# Patient Record
Sex: MALE | Race: WHITE | NOT HISPANIC OR LATINO | Employment: OTHER | URBAN - METROPOLITAN AREA
[De-identification: names, ages, dates, MRNs, and addresses within clinical notes are randomized per-mention and may not be internally consistent; named-entity substitution may affect disease eponyms.]

---

## 2015-11-10 LAB — HBA1C MFR BLD HPLC: 6.3 %

## 2018-08-23 ENCOUNTER — TELEPHONE (OUTPATIENT)
Dept: OBGYN CLINIC | Facility: HOSPITAL | Age: 68
End: 2018-08-23

## 2018-08-23 NOTE — TELEPHONE ENCOUNTER
Call from patient   Call back # [phone]  Bismark Mace       Patient would like a letter stating he had hip surgery and needs to pre-medicate before any dental work  Please mail to patients home     [address] [place] [address]      Patient would also like antibiotics sent to [facility]  Dannydra Zaragoza is requesting more than 1 dose, he will need to return to dentist a few times       Pharmacy-   [facility]/pharmacy #[id number], [address] [facility] [address], [address] [facility] [address] (Phone)  [phone] (Fax)     Thank you

## 2018-08-24 DIAGNOSIS — M25.569 KNEE PAIN, UNSPECIFIED CHRONICITY, UNSPECIFIED LATERALITY: Primary | ICD-10-CM

## 2018-08-24 RX ORDER — CEPHALEXIN 500 MG/1
500 CAPSULE ORAL SEE ADMIN INSTRUCTIONS
Qty: 4 CAPSULE | Refills: 2 | Status: SHIPPED | OUTPATIENT
Start: 2018-08-24 | End: 2018-08-25

## 2018-09-06 ENCOUNTER — OFFICE VISIT (OUTPATIENT)
Dept: FAMILY MEDICINE CLINIC | Facility: CLINIC | Age: 68
End: 2018-09-06
Payer: COMMERCIAL

## 2018-09-06 VITALS
RESPIRATION RATE: 18 BRPM | OXYGEN SATURATION: 98 % | BODY MASS INDEX: 24.53 KG/M2 | SYSTOLIC BLOOD PRESSURE: 152 MMHG | HEART RATE: 71 BPM | TEMPERATURE: 98 F | HEIGHT: 71 IN | WEIGHT: 175.2 LBS | DIASTOLIC BLOOD PRESSURE: 72 MMHG

## 2018-09-06 DIAGNOSIS — R73.01 IFG (IMPAIRED FASTING GLUCOSE): Primary | ICD-10-CM

## 2018-09-06 DIAGNOSIS — Z13.220 SCREENING CHOLESTEROL LEVEL: ICD-10-CM

## 2018-09-06 DIAGNOSIS — Z12.5 SCREENING PSA (PROSTATE SPECIFIC ANTIGEN): ICD-10-CM

## 2018-09-06 PROCEDURE — 99387 INIT PM E/M NEW PAT 65+ YRS: CPT | Performed by: FAMILY MEDICINE

## 2018-09-06 PROCEDURE — 1160F RVW MEDS BY RX/DR IN RCRD: CPT | Performed by: FAMILY MEDICINE

## 2018-09-06 RX ORDER — CEPHALEXIN 500 MG/1
CAPSULE ORAL
COMMUNITY
Start: 2015-11-10 | End: 2018-09-06

## 2018-09-06 RX ORDER — MELATONIN
1000 DAILY
COMMUNITY
End: 2019-01-04

## 2018-09-06 NOTE — PROGRESS NOTES
Assessment/Plan:    No problem-specific Assessment & Plan notes found for this encounter  Diagnoses and all orders for this visit:    IFG (impaired fasting glucose)  -     HEMOGLOBIN A1C W/ EAG ESTIMATION; Future  -     Comprehensive metabolic panel; Future  -     Lipid Panel with Direct LDL reflex; Future  -     PSA, total and free; Future    Screening PSA (prostate specific antigen)  -     HEMOGLOBIN A1C W/ EAG ESTIMATION; Future  -     Comprehensive metabolic panel; Future  -     Lipid Panel with Direct LDL reflex; Future  -     PSA, total and free; Future    Screening cholesterol level  -     HEMOGLOBIN A1C W/ EAG ESTIMATION; Future  -     Comprehensive metabolic panel; Future  -     Lipid Panel with Direct LDL reflex; Future  -     PSA, total and free; Future    Get labs will call you with results  If all lab stable physical yearly        Subjective:      Patient ID: Mohsen Jama is a 79 y o  male  HPI    New pt  Presents to establish care and get a physical    Patient has a healthy 68-year-old male who is  and recently retired  He is physically active he bikes every day  He maintains a healthy diet  Last physical about 3 years ago  He is due for his colonoscopy, per patient last 1 was normal       He has no acute issues or complaints  The following portions of the patient's history were reviewed and updated as appropriate: allergies, current medications, past family history, past medical history, past social history, past surgical history and problem list     Review of Systems   Constitutional: Negative for activity change and appetite change  Respiratory: Negative  Cardiovascular: Negative  Gastrointestinal: Negative  Genitourinary: Negative  Musculoskeletal: Negative for arthralgias  Skin: Negative for rash  Psychiatric/Behavioral: Negative            Objective:      /72   Pulse 71   Temp 98 °F (36 7 °C) (Oral)   Resp 18   Ht 5' 10 87" (1 8 m)   Wt 79 5 kg (175 lb 3 2 oz)   SpO2 98%   BMI 24 53 kg/m²          Physical Exam   Constitutional: He is oriented to person, place, and time  He appears well-developed and well-nourished  No distress  HENT:   Head: Normocephalic  Right Ear: External ear normal    Left Ear: External ear normal    Eyes: Conjunctivae and EOM are normal    Cardiovascular: Normal rate, regular rhythm and normal heart sounds  Pulmonary/Chest: Effort normal and breath sounds normal  No respiratory distress  He has no wheezes  He has no rales  Abdominal: Soft  Bowel sounds are normal  He exhibits no distension  There is no tenderness  Genitourinary: Rectum normal and prostate normal  Rectal exam shows guaiac negative stool  Musculoskeletal: Normal range of motion  He exhibits no edema  Lymphadenopathy:     He has no cervical adenopathy  Neurological: He is alert and oriented to person, place, and time  Psychiatric: He has a normal mood and affect   His behavior is normal

## 2018-09-07 ENCOUNTER — APPOINTMENT (OUTPATIENT)
Dept: LAB | Facility: HOSPITAL | Age: 68
End: 2018-09-07
Payer: COMMERCIAL

## 2018-09-07 DIAGNOSIS — Z12.5 SCREENING PSA (PROSTATE SPECIFIC ANTIGEN): ICD-10-CM

## 2018-09-07 DIAGNOSIS — R73.01 IFG (IMPAIRED FASTING GLUCOSE): ICD-10-CM

## 2018-09-07 DIAGNOSIS — Z13.220 SCREENING CHOLESTEROL LEVEL: ICD-10-CM

## 2018-09-07 LAB
ALBUMIN SERPL BCP-MCNC: 3.9 G/DL (ref 3.5–5)
ALP SERPL-CCNC: 80 U/L (ref 46–116)
ALT SERPL W P-5'-P-CCNC: 25 U/L (ref 12–78)
ANION GAP SERPL CALCULATED.3IONS-SCNC: 8 MMOL/L (ref 4–13)
AST SERPL W P-5'-P-CCNC: 22 U/L (ref 5–45)
BILIRUB SERPL-MCNC: 0.83 MG/DL (ref 0.2–1)
BUN SERPL-MCNC: 28 MG/DL (ref 5–25)
CALCIUM SERPL-MCNC: 8.9 MG/DL (ref 8.3–10.1)
CHLORIDE SERPL-SCNC: 107 MMOL/L (ref 100–108)
CHOLEST SERPL-MCNC: 144 MG/DL (ref 50–200)
CO2 SERPL-SCNC: 25 MMOL/L (ref 21–32)
CREAT SERPL-MCNC: 1 MG/DL (ref 0.6–1.3)
EST. AVERAGE GLUCOSE BLD GHB EST-MCNC: 128 MG/DL
GFR SERPL CREATININE-BSD FRML MDRD: 78 ML/MIN/1.73SQ M
GLUCOSE P FAST SERPL-MCNC: 125 MG/DL (ref 65–99)
HBA1C MFR BLD: 6.1 % (ref 4.2–6.3)
HDLC SERPL-MCNC: 56 MG/DL (ref 40–60)
LDLC SERPL CALC-MCNC: 80 MG/DL (ref 0–100)
POTASSIUM SERPL-SCNC: 4.6 MMOL/L (ref 3.5–5.3)
PROT SERPL-MCNC: 7 G/DL (ref 6.4–8.2)
SODIUM SERPL-SCNC: 140 MMOL/L (ref 136–145)
TRIGL SERPL-MCNC: 39 MG/DL

## 2018-09-07 PROCEDURE — 84154 ASSAY OF PSA FREE: CPT

## 2018-09-07 PROCEDURE — 80053 COMPREHEN METABOLIC PANEL: CPT

## 2018-09-07 PROCEDURE — 84153 ASSAY OF PSA TOTAL: CPT

## 2018-09-07 PROCEDURE — 80061 LIPID PANEL: CPT

## 2018-09-07 PROCEDURE — 36415 COLL VENOUS BLD VENIPUNCTURE: CPT

## 2018-09-07 PROCEDURE — 83036 HEMOGLOBIN GLYCOSYLATED A1C: CPT

## 2018-09-08 LAB
PSA FREE MFR SERPL: 9.4 %
PSA FREE SERPL-MCNC: 0.31 NG/ML
PSA SERPL-MCNC: 3.3 NG/ML (ref 0–4)

## 2018-11-16 ENCOUNTER — OFFICE VISIT (OUTPATIENT)
Dept: FAMILY MEDICINE CLINIC | Facility: CLINIC | Age: 68
End: 2018-11-16

## 2018-11-16 VITALS
WEIGHT: 185 LBS | BODY MASS INDEX: 25.9 KG/M2 | HEIGHT: 71 IN | SYSTOLIC BLOOD PRESSURE: 120 MMHG | TEMPERATURE: 97.3 F | DIASTOLIC BLOOD PRESSURE: 68 MMHG | RESPIRATION RATE: 12 BRPM | HEART RATE: 58 BPM

## 2018-11-16 DIAGNOSIS — Z13.6 SCREENING FOR CARDIOVASCULAR CONDITION: ICD-10-CM

## 2018-11-16 DIAGNOSIS — Z11.59 ENCOUNTER FOR HEPATITIS C SCREENING TEST FOR LOW RISK PATIENT: ICD-10-CM

## 2018-11-16 DIAGNOSIS — H91.90 PERCEIVED HEARING CHANGES: Primary | ICD-10-CM

## 2018-11-16 DIAGNOSIS — R73.01 IFG (IMPAIRED FASTING GLUCOSE): ICD-10-CM

## 2018-11-16 PROCEDURE — 3008F BODY MASS INDEX DOCD: CPT | Performed by: NURSE PRACTITIONER

## 2018-11-16 PROCEDURE — 99213 OFFICE O/P EST LOW 20 MIN: CPT | Performed by: NURSE PRACTITIONER

## 2018-11-16 PROCEDURE — 1101F PT FALLS ASSESS-DOCD LE1/YR: CPT | Performed by: NURSE PRACTITIONER

## 2018-11-16 RX ORDER — INFLUENZA A VIRUSA/MICHIGAN/45/2015 X-275 (H1N1) ANTIGEN (FORMALDEHYDE INACTIVATED), INFLUENZA A VIRUS A/HONG KONG/4801/2014 X-263B (H3N2) ANTIGEN (FORMALDEHYDE INACTIVATED), AND INFLUENZA B VIRUS B/BRISBANE/60/2008 ANTIGEN (FORMALDEHYDE INACTIVATED) 60; 60; 60 UG/.5ML; UG/.5ML; UG/.5ML
INJECTION, SUSPENSION INTRAMUSCULAR
Refills: 0 | COMMUNITY
Start: 2018-10-15 | End: 2018-11-16

## 2018-11-16 NOTE — PROGRESS NOTES
Assessment/Plan:    No problem-specific Assessment & Plan notes found for this encounter  Diagnoses and all orders for this visit:    Perceived hearing changes  -     Ambulatory referral to Audiology; Future    Encounter for hepatitis C screening test for low risk patient  -     Hepatitis C antibody; Future    IFG (impaired fasting glucose)  -     Comprehensive metabolic panel; Future  -     Hemoglobin A1C; Future    Screening for cardiovascular condition  -     Lipid panel; Future  -     Comprehensive metabolic panel; Future  -     Hemoglobin A1C; Future    Other orders  -     Discontinue: FLUZONE HIGH-DOSE 0 5 ML VERÓNICA; TO BE ADMINISTERED BY PHARMACIST FOR IMMUNIZATION          Subjective:      Patient ID: Felicitas Morales is a 79 y o  male  HPI  Here for an acute - hearing changes over the last few months - unable to hear TV at times; conversations  No ear pain   No ringing in the ear  Use air buds to work out  The following portions of the patient's history were reviewed and updated as appropriate: allergies, past social history, past surgical history and problem list     Review of Systems   Constitutional: Negative  HENT: Positive for hearing loss  Respiratory: Negative  Cardiovascular: Negative  Objective:      Blood pressure 120/68, pulse 58, temperature (!) 97 3 °F (36 3 °C), resp  rate 12, height 5' 11" (1 803 m), weight 83 9 kg (185 lb)  Physical Exam   Constitutional: He is oriented to person, place, and time  He appears well-developed and well-nourished  No distress  HENT:   Head: Normocephalic  Right Ear: Tympanic membrane and ear canal normal  No middle ear effusion  No decreased hearing is noted  Left Ear: Tympanic membrane and ear canal normal   No middle ear effusion  Decreased hearing (BC> AC but less then RT ear; no lateralization) is noted     Nose: Nose normal    Mouth/Throat: Uvula is midline and oropharynx is clear and moist    Cardiovascular: Normal rate and regular rhythm  Pulmonary/Chest: Effort normal and breath sounds normal  No respiratory distress  Neurological: He is alert and oriented to person, place, and time  Skin: Skin is warm and dry  Psychiatric: He has a normal mood and affect   His behavior is normal

## 2018-12-04 ENCOUNTER — OFFICE VISIT (OUTPATIENT)
Dept: AUDIOLOGY | Age: 68
End: 2018-12-04
Payer: COMMERCIAL

## 2018-12-04 DIAGNOSIS — H90.3 SENSORINEURAL HEARING LOSS, BILATERAL: Primary | ICD-10-CM

## 2018-12-04 PROCEDURE — 92567 TYMPANOMETRY: CPT | Performed by: AUDIOLOGIST

## 2018-12-04 PROCEDURE — 92557 COMPREHENSIVE HEARING TEST: CPT | Performed by: AUDIOLOGIST

## 2018-12-04 NOTE — LETTER
2018     Render Alissa, [address] [facility] [place] [address]    Patient: Darren Posey   YOB: 1950   Date of Visit: 2018       Dear Dr Nick Bruce: Thank you for referring Monico Amador to me for evaluation  Below are my notes for this consultation  If you have questions, please do not hesitate to call me  I look forward to following your patient along with you  Sincerely,        Liam        CC: No Recipients  Liam  2018 12:54 PM  Sign at close encounter  HEARING EVALUATION    Name:  Darren Posey  :  1950  Age:  79 y o  Date of Evaluation: 18     History: Difficulty Understanding  Reason for visit: Darren Posey is being seen today at the request of Dr Nick Bruce for an evaluation of hearing  Patient reports difficulty   EVALUATION:    Otoscopic Evaluation:   Right Ear: Clear and healthy ear canal and tympanic membrane   Left Ear: Clear and healthy ear canal and tympanic membrane    Tympanometry:   Right: Type A - normal middle ear pressure and compliance   Left: Type A - normal middle ear pressure and compliance    Audiogram Results:  Pure tone testing revealed a mild sensorineural hearing loss in the  left  ear and a normal sloping to mild sensorineural hearing loss in the  right ear  SRT and PTA are in agreement indicating good test reliability  Word recognition scores were excellent bilaterally  *see attached audiogram      RECOMMENDATIONS:  Annual hearing eval, Return to [facility]  for F/U and Copy to Patient/Caregiver    PATIENT EDUCATION:   Discussed results and recommendations with patient  Questions were addressed and the patient was encouraged to contact our department should concerns arise        Sruthi Wheeler CCC-A  Clinical Audiologist

## 2018-12-04 NOTE — PROGRESS NOTES
HEARING EVALUATION    Name:  Maged Barr  :  1950  Age:  79 y o  Date of Evaluation: 18     History: Difficulty Understanding  Reason for visit: Maged Barr is being seen today at the request of Dr Chema Mejia for an evaluation of hearing  Patient reports difficulty   EVALUATION:    Otoscopic Evaluation:   Right Ear: Clear and healthy ear canal and tympanic membrane   Left Ear: Clear and healthy ear canal and tympanic membrane    Tympanometry:   Right: Type A - normal middle ear pressure and compliance   Left: Type A - normal middle ear pressure and compliance    Audiogram Results:  Pure tone testing revealed a mild sensorineural hearing loss in the  left  ear and a normal sloping to mild sensorineural hearing loss in the  right ear  SRT and PTA are in agreement indicating good test reliability  Word recognition scores were excellent bilaterally  *see attached audiogram      RECOMMENDATIONS:  Annual hearing eval, Return to [facility]  for F/U and Copy to Patient/Caregiver    PATIENT EDUCATION:   Discussed results and recommendations with patient  Questions were addressed and the patient was encouraged to contact our department should concerns arise        Sruthi Ruvalcaba , [facility]-A  Clinical Audiologist

## 2019-01-04 ENCOUNTER — APPOINTMENT (EMERGENCY)
Dept: RADIOLOGY | Facility: HOSPITAL | Age: 69
End: 2019-01-04
Payer: COMMERCIAL

## 2019-01-04 ENCOUNTER — HOSPITAL ENCOUNTER (EMERGENCY)
Facility: HOSPITAL | Age: 69
Discharge: HOME | End: 2019-01-04
Attending: EMERGENCY MEDICINE | Admitting: EMERGENCY MEDICINE
Payer: COMMERCIAL

## 2019-01-04 VITALS
RESPIRATION RATE: 18 BRPM | HEIGHT: 71 IN | OXYGEN SATURATION: 95 % | WEIGHT: 185 LBS | SYSTOLIC BLOOD PRESSURE: 131 MMHG | TEMPERATURE: 97.7 F | BODY MASS INDEX: 25.9 KG/M2 | HEART RATE: 76 BPM | DIASTOLIC BLOOD PRESSURE: 62 MMHG

## 2019-01-04 DIAGNOSIS — S63.259A DISLOCATION CLOSED, FINGER, INITIAL ENCOUNTER: Primary | ICD-10-CM
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
## 2019-01-04 DIAGNOSIS — M25.559 HIP PAIN: ICD-10-CM

## 2019-01-04 LAB
HOLD SPECIMEN: NORMAL

## 2019-01-04 PROCEDURE — 71045 X-RAY EXAM CHEST 1 VIEW: CPT

## 2019-01-04 PROCEDURE — 99284 EMERGENCY DEPT VISIT MOD MDM: CPT

## 2019-01-04 PROCEDURE — 96374 THER/PROPH/DIAG INJ IV PUSH: CPT

## 2019-01-04 PROCEDURE — 73140 X-RAY EXAM OF FINGER(S): CPT

## 2019-01-04 PROCEDURE — 73502 X-RAY EXAM HIP UNI 2-3 VIEWS: CPT

## 2019-01-04 PROCEDURE — 96375 TX/PRO/DX INJ NEW DRUG ADDON: CPT

## 2019-01-04 PROCEDURE — 36415 COLL VENOUS BLD VENIPUNCTURE: CPT | Performed by: EMERGENCY MEDICINE

## 2019-01-04 PROCEDURE — 96376 TX/PRO/DX INJ SAME DRUG ADON: CPT

## 2019-01-04 RX ORDER — MORPHINE SULFATE 4 MG/ML
4 INJECTION, SOLUTION INTRAMUSCULAR; INTRAVENOUS ONCE
Status: COMPLETED | OUTPATIENT
Start: 2019-01-04 | End: 2019-01-04

## 2019-01-04 RX ORDER — KETOROLAC TROMETHAMINE 30 MG/ML
15 INJECTION, SOLUTION INTRAMUSCULAR; INTRAVENOUS ONCE
Status: COMPLETED | OUTPATIENT
Start: 2019-01-04 | End: 2019-01-04

## 2019-01-04 RX ORDER — LORAZEPAM 2 MG/ML
1 INJECTION INTRAMUSCULAR ONCE
Status: COMPLETED | OUTPATIENT
Start: 2019-01-04 | End: 2019-01-04

## 2019-01-04 RX ADMIN — MORPHINE SULFATE 4 MG: 4 INJECTION INTRAVENOUS at 11:31

## 2019-01-04 RX ADMIN — MORPHINE SULFATE 4 MG: 4 INJECTION INTRAVENOUS at 13:52

## 2019-01-04 RX ADMIN — LORAZEPAM 1 MG: 2 INJECTION, SOLUTION INTRAMUSCULAR; INTRAVENOUS at 11:33

## 2019-01-04 RX ADMIN — KETOROLAC TROMETHAMINE 15 MG: 30 INJECTION, SOLUTION INTRAMUSCULAR at 16:18

## 2019-01-04 NOTE — ED NOTES
Attempted to ambulate pt x2 at this time  Dr Gina Marquez at bedside  Pt denies lightheadedness at this time, but reports increased pain with ambulation  Pt retuned back to bed  Dr Gina Marquez requests pt get a dose of toradol and attempt to ambulate patient x3        Gem Fam RN  01/04/19 0930
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
## 2019-01-04 NOTE — ED NOTES
Pt attempted to get out of bed to ambulate at this time  Pt reports lightheadedness and returned to bed  Dr Latonia Oneal and Dr Macias Pass notified  Dr Latonia Oneal requests pt eat and drink and attempt to ambulate pt again        Sonu Pederson, RN  01/04/19 8288
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
## 2019-01-04 NOTE — ED NOTES
Pt ambulated with slight difficulty at this time  Pt requests a walker script for discharge        Tristan Elmore RN  01/04/19 0511

## 2019-01-04 NOTE — DISCHARGE INSTRUCTIONS
Finger Dislocation   WHAT YOU NEED TO KNOW:   A finger dislocation occurs when bones in your finger move out of their normal position  This takes place at a joint (where bones meet)  DISCHARGE INSTRUCTIONS:   Care for your finger:  Care for your finger as directed  This may help reduce pain and swelling  It also may improve how well you can move and use your finger  · Ice your finger: This can help decrease pain and swelling  Place a plastic bag filled with ice, or an ice pack, on your finger  Apply an ice pack as often as directed  · Elevate your finger:  Keep your finger above the level of your heart  This can help reduce swelling  Prop your arm or hand on a pillow  This should be done as often as you can for the first 1 to 3 days after your injury  Medicines:   · Pain medicine: You may be given medicine to take at home to take away or decrease pain  Do not wait until the pain is too bad before taking your medicine  · Take your medicine as directed  Contact your healthcare provider if you think your medicine is not helping or if you have side effects  Tell him or her if you are allergic to any medicine  Keep a list of the medicines, vitamins, and herbs you take  Include the amounts, and when and why you take them  Bring the list or the pill bottles to follow-up visits  Carry your medicine list with you in case of an emergency  Exercise your finger:  Exercise can help reduce pain, swelling, and stiffness in your finger  It also can help increase strength and movement  You may need to exercise your finger as soon as you can  You also may be told not to move your finger for a few weeks  Be sure to follow your healthcare provider's instructions  Occupational therapy (OT) may be ordered for you  A therapist works with you to help you regain movement in your finger    Care for your splint or cast:  Your injured finger may be vy-taped, splinted, or put in a cast to hold your finger or thumb in place while it heals  Yuri tape is when your injured finger is taped to the finger next to it  A splint is a piece of stiff material attached to your finger using cloth straps  You may have these treatments for 8 weeks or more  To care for your splint or cast:  · Do not get your splint or cast wet  Use a plastic bag to cover the splint or cast if you shower  · Keep your splint or cast clean  Make sure no dirt gets under your splint or cast     · Do not trim your cast without talking to your healthcare provider  Also, never remove your cast on your own  Follow up with your healthcare provider or hand specialist as directed:  Write down your questions so you remember to ask them during your follow-up visits  Contact your healthcare provider or hand specialist if:   · You have numbness or tingling in your hand  · The skin under your cast or splint burns or stings  · The skin around your cast becomes red or raw  · Your cast becomes cracked or damaged  · You have questions or concerns about your injury or treatment  Return to the emergency department if:   · You have increased swelling beneath your splint or cast     · You think your cast or splint is too tight  · You cannot move your fingers  © 2017 [address] Information is for End User's use only and may not be sold, redistributed or otherwise used for commercial purposes  All illustrations and images included in CareNotes® are the copyrighted property of [employer]  or [employer]  The above information is an  only  It is not intended as medical advice for individual conditions or treatments  Talk to your doctor, nurse or pharmacist before following any medical regimen to see if it is safe and effective for you  Hip Pain   WHAT YOU NEED TO KNOW:   Hip pain can be caused by a number of conditions, such as bursitis, arthritis, or muscle or tendon strain  X-rays do not show broken bones   You may have swelling in the fluid-filled sacs that protect your muscles and tendons  Hip pain can also be caused by a lower back problem  Hip pain may be caused by trauma, playing sports, or running  Your pain may start in your hip and go to your thigh, buttock, or groin  DISCHARGE INSTRUCTIONS:   Medicines:   · NSAIDs , such as ibuprofen, help decrease swelling, pain, and fever  This medicine is available with or without a doctor's order  NSAIDs can cause stomach bleeding or kidney problems in certain people  If you take blood thinner medicine, always ask your healthcare provider if NSAIDs are safe for you  Always read the medicine label and follow directions  · Take your medicine as directed  Contact your healthcare provider if you think your medicine is not helping or if you have side effects  Tell him of her if you are allergic to any medicine  Keep a list of the medicines, vitamins, and herbs you take  Include the amounts, and when and why you take them  Bring the list or the pill bottles to follow-up visits  Carry your medicine list with you in case of an emergency  Return to the emergency department if:   · Your pain gets worse  · You have numbness in your leg or toes  · You cannot put any weight on or move your hip  Contact your healthcare provider if:   · You have a fever  · Your pain does not decrease, even after treatment  · You have questions or concerns about your condition or care  Follow up with your healthcare provider as directed: You may need physical therapy, an injection, or more testing  You may need to see an orthopedic specialist  Write down your questions so you remember to ask them during your visits  Manage your hip pain:   · Rest  your injured hip so that it can heal  You may need to avoid putting any weight on your hip for at least 48 hours  Return to normal activities as directed  · Ice  the injury for 20 minutes every 4 hours, or as directed   Use an ice pack, or put crushed ice in a plastic bag  Cover it with a towel to protect your skin  Ice helps prevent tissue damage and decreases swelling and pain  · Elevate  your injured hip above the level of your heart as often as you can  This will help decrease swelling and pain  If possible, prop your hip and leg on pillows or blankets to keep the area elevated comfortably  · Maintain a healthy weight  Extra body weight can cause pressure and pain in your hip, knee, and ankle joints  Ask your healthcare provider how much you should weigh  Ask him to help you create a weight loss plan if you are overweight  · Use assistive devices as directed  You may need to use a cane or crutches  Assistive devices help decrease pain and pressure on your hip when you walk  Ask your healthcare provider for more information about assistive devices and how to use them correctly  © 2017 [address]  Information is for End User's use only and may not be sold, redistributed or otherwise used for commercial purposes  All illustrations and images included in CareNotes® are the copyrighted property of [employer]  or [employer]  The above information is an  only  It is not intended as medical advice for individual conditions or treatments  Talk to your doctor, nurse or pharmacist before following any medical regimen to see if it is safe and effective for you

## 2019-01-25 ENCOUNTER — OFFICE VISIT (OUTPATIENT)
Dept: FAMILY MEDICINE CLINIC | Facility: CLINIC | Age: 69
End: 2019-01-25
Payer: COMMERCIAL

## 2019-01-25 VITALS
TEMPERATURE: 97.5 F | WEIGHT: 193.2 LBS | BODY MASS INDEX: 27.05 KG/M2 | HEIGHT: 71 IN | SYSTOLIC BLOOD PRESSURE: 138 MMHG | RESPIRATION RATE: 16 BRPM | HEART RATE: 64 BPM | DIASTOLIC BLOOD PRESSURE: 82 MMHG | OXYGEN SATURATION: 98 %

## 2019-01-25 DIAGNOSIS — M25.552 LEFT HIP PAIN: Primary | ICD-10-CM

## 2019-01-25 DIAGNOSIS — S63.259D: ICD-10-CM

## 2019-01-25 PROCEDURE — 3008F BODY MASS INDEX DOCD: CPT | Performed by: NURSE PRACTITIONER

## 2019-01-25 PROCEDURE — 99214 OFFICE O/P EST MOD 30 MIN: CPT | Performed by: NURSE PRACTITIONER
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
## 2019-01-31 ENCOUNTER — EVALUATION (OUTPATIENT)
Dept: PHYSICAL THERAPY | Facility: CLINIC | Age: 69
End: 2019-01-31
Payer: COMMERCIAL

## 2019-01-31 DIAGNOSIS — M25.552 LEFT HIP PAIN: Primary | ICD-10-CM

## 2019-01-31 PROCEDURE — 97161 PT EVAL LOW COMPLEX 20 MIN: CPT | Performed by: PHYSICAL THERAPIST
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
## 2019-02-05 ENCOUNTER — OFFICE VISIT (OUTPATIENT)
Dept: PHYSICAL THERAPY | Facility: CLINIC | Age: 69
End: 2019-02-05
Payer: COMMERCIAL

## 2019-02-05 DIAGNOSIS — M25.552 LEFT HIP PAIN: Primary | ICD-10-CM

## 2019-02-05 PROCEDURE — 97110 THERAPEUTIC EXERCISES: CPT

## 2019-02-05 PROCEDURE — 97140 MANUAL THERAPY 1/> REGIONS: CPT

## 2019-02-05 NOTE — PROGRESS NOTES
Daily Note     Today's date: 2019  Patient name: Wanda Walker  : 1950  MRN: [id number]  Referring provider: MARCELLUS Arnett  Dx:   Encounter Diagnosis     ICD-10-CM    1  Left hip pain M25 552                   Subjective: Pt reports that he is feeling better then what he did on IE  Notes that he currently has 0/10 pain with sitting and at rest but a 2/10 with ambulation  Also notes that his lower back is slightly painful today  Objective: See treatment diary below  Precautions: R JED      Daily Treatment Diary         Manuals  2/5                     Long axis distraction  3'                     prom  10'                     Consider MWM for IR                                               Exercise Diary                        Recumbent bike  7'                     Hip fallout ER  20x                     Ball squeeze  5"x20                     bridges  3" 20x                     Prone hip ir/er  5"x20 ea                     HS stretch  30"x4                     Gluteal stretch  30"x4                     Standing slr abd/ext  2x10 ea                                                                                                                                                                                                                                                                                                                                                                     Modalities                                                                              Assessment: Tolerated treatment well  Pt was mostly limited in L hamstring flexibility as well as hip IR  Pt was able to tolerate all newly added TE this session with no increased symptoms or other complaints  Patient demonstrated fatigue post treatment, exhibited good technique with therapeutic exercises and would benefit from continued PT      Plan: Continue per plan of care

## 2019-02-07 ENCOUNTER — OFFICE VISIT (OUTPATIENT)
Dept: PHYSICAL THERAPY | Facility: CLINIC | Age: 69
End: 2019-02-07
Payer: COMMERCIAL

## 2019-02-07 DIAGNOSIS — M25.552 LEFT HIP PAIN: Primary | ICD-10-CM

## 2019-02-07 PROCEDURE — 97110 THERAPEUTIC EXERCISES: CPT

## 2019-02-07 PROCEDURE — 97140 MANUAL THERAPY 1/> REGIONS: CPT
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
## 2019-02-12 ENCOUNTER — OFFICE VISIT (OUTPATIENT)
Dept: PHYSICAL THERAPY | Facility: CLINIC | Age: 69
End: 2019-02-12
Payer: COMMERCIAL

## 2019-02-12 DIAGNOSIS — M25.552 LEFT HIP PAIN: Primary | ICD-10-CM

## 2019-02-12 PROCEDURE — 97110 THERAPEUTIC EXERCISES: CPT

## 2019-02-12 PROCEDURE — 97140 MANUAL THERAPY 1/> REGIONS: CPT

## 2019-02-14 ENCOUNTER — APPOINTMENT (OUTPATIENT)
Dept: PHYSICAL THERAPY | Facility: CLINIC | Age: 69
End: 2019-02-14
Payer: COMMERCIAL

## 2019-02-19 ENCOUNTER — APPOINTMENT (OUTPATIENT)
Dept: PHYSICAL THERAPY | Facility: CLINIC | Age: 69
End: 2019-02-19
Payer: COMMERCIAL

## 2019-02-21 ENCOUNTER — APPOINTMENT (OUTPATIENT)
Dept: PHYSICAL THERAPY | Facility: CLINIC | Age: 69
End: 2019-02-21
Payer: COMMERCIAL

## 2019-02-26 ENCOUNTER — OFFICE VISIT (OUTPATIENT)
Dept: PHYSICAL THERAPY | Facility: CLINIC | Age: 69
End: 2019-02-26
Payer: COMMERCIAL

## 2019-02-26 DIAGNOSIS — M25.552 LEFT HIP PAIN: Primary | ICD-10-CM

## 2019-02-26 PROCEDURE — 97110 THERAPEUTIC EXERCISES: CPT

## 2019-02-26 PROCEDURE — 97140 MANUAL THERAPY 1/> REGIONS: CPT

## 2019-02-26 NOTE — PROGRESS NOTES
Daily Note     Today's date: 2019  Patient name: Chika Khan  : 1950  MRN: [id number]  Referring provider: MARCELLUS Lucio  Dx:   Encounter Diagnosis     ICD-10-CM    1  Left hip pain M25 552                 Subjective: Patient states, "I am walking almost normally "    Objective: See treatment diary below  Precautions: R JED      Daily Treatment Diary      Manuals                 Long axis distraction  3'  3'  3'  3'               prom  10'  10'  9'  9'               Consider MWM for IR                                               Exercise Diary                        Recumbent bike  7'  7'  8'  10'               Hip fallout ER  20x  20x  5"x20  5"x20               Ball squeeze  5"x20  5"x20  5"x20  NP               bridges  3" 20x  3"x20  5"x20  with ball squeeze  5"x20               Prone hip ir/er  5"x20 ea  5"x20  5"x20  5"x20               HS stretch  30"x4  30"x4  30"x4  30"x4               Gluteal stretch  30"x4  30"x4  30"x4  30"x4               Standing slr abd/ext  2x10 ea  2x10 ea  20 ea  20 ea               S/L Clamshells    rtb 2x10  red  1x20  supine   green  1x20               Step Ups    6" x20  10" x20  10" x20                sidestepping       red tb  12ft x6                squats leg press        20# x20                                                                                                                                                                                                                                                               Modalities                                                                         Assessment: Progression of therapeutic exercise program is tolerated well without complaints  Plan: Continue per plan of care

## 2019-02-28 ENCOUNTER — OFFICE VISIT (OUTPATIENT)
Dept: PHYSICAL THERAPY | Facility: CLINIC | Age: 69
End: 2019-02-28
Payer: COMMERCIAL

## 2019-02-28 DIAGNOSIS — M25.552 LEFT HIP PAIN: Primary | ICD-10-CM

## 2019-02-28 PROCEDURE — 97110 THERAPEUTIC EXERCISES: CPT

## 2019-02-28 PROCEDURE — 97140 MANUAL THERAPY 1/> REGIONS: CPT

## 2019-02-28 NOTE — PROGRESS NOTES
Daily Note     Today's date: 2019  Patient name: Santos Darling  : 1950  MRN: [id number]  Referring provider: MARCELLUS Toro  Dx:   Encounter Diagnosis     ICD-10-CM    1  Left hip pain M25 552                 Subjective: Patient reports he experienced only mild muscle soreness after the last PT visit  Patient complains of discomfort in the left groin area this morning however "it's not too bad today "    Objective: See treatment diary below    Precautions: R JED      Daily Treatment Diary      Manuals               Long axis distraction  3'  3'  3'  3'  3'             prom  10'  10'  9'  9'  7'             Consider MWM for IR                                               Exercise Diary                        Recumbent bike  7'  7'  8'  10'  10'             Hip fallout ER  20x  20x  5"x20  5"x20  5"x20             Ball squeeze  5"x20  5"x20  5"x20  NP  NP             bridges  3" 20x  3"x20  5"x20  with ball squeeze  5"x20  with ball squeeze  5"x20             Prone hip ir/er  5"x20 ea  5"x20  5"x20  5"x20  NP             HS stretch  30"x4  30"x4  30"x4  30"x4  30"x4             Gluteal stretch  30"x4  30"x4  30"x4  30"x4  30"x4             Standing slr abd/ext  2x10 ea  2x10 ea  20 ea  20 ea  1 5#   20 ea             S/L Clamshells    rtb 2x10  red  1x20  supine   green  1x20  supine   green  1x20             Step Ups    6" x20  10" x20  10" x20  10" x20              sidestepping       red tb  12ft x6   red tb  12ft x6              squats leg press        20# x20  30# x20                                                                                                                                                                                                                                                             Modalities                                                                         Assessment: Progression of therapeutic exercise program is tolerated well without complaints  Plan: Continue per plan of care

## 2019-03-05 ENCOUNTER — OFFICE VISIT (OUTPATIENT)
Dept: PHYSICAL THERAPY | Facility: CLINIC | Age: 69
End: 2019-03-05
Payer: COMMERCIAL

## 2019-03-05 DIAGNOSIS — M25.552 LEFT HIP PAIN: Primary | ICD-10-CM

## 2019-03-05 PROCEDURE — 97140 MANUAL THERAPY 1/> REGIONS: CPT

## 2019-03-05 PROCEDURE — 97110 THERAPEUTIC EXERCISES: CPT

## 2019-03-05 NOTE — PROGRESS NOTES
Daily Note     Today's date: 3/5/2019  Patient name: Kristopher Nuñez  : 1950  MRN: [id number]  Referring provider: MARCELLUS Robles  Dx:   Encounter Diagnosis     ICD-10-CM    1  Left hip pain M25 552                 Subjective: Patient reports left hip pain continues to improve  Objective: See treatment diary below    Precautions: R JED      Daily Treatment Diary      Manuals  2/5  2/7  2/12  2/26  2/28  3/5           Long axis distraction  3'  3'  3'  3'  3'  3'           prom  10'  10'  9'  9'  7'  7'           Consider MWM for IR                                               Exercise Diary                        Recumbent bike  7'  7'  8'  10'  10'  10'           Hip fallout ER  20x  20x  5"x20  5"x20  5"x20  5"x10           Ball squeeze  5"x20  5"x20  5"x20  NP  NP  NP           bridges  3" 20x  3"x20  5"x20  with ball squeeze  5"x20  with ball squeeze  5"x20 with ball squeeze  5"x20           Prone hip ir/er  5"x20 ea  5"x20  5"x20  5"x20  NP NP           HS stretch  30"x4  30"x4  30"x4  30"x4  30"x4  30"x4           Gluteal stretch  30"x4  30"x4  30"x4  30"x4  30"x4  30"x4           Standing slr abd/ext  2x10 ea  2x10 ea  20 ea  20 ea  1 5#   20 ea  2#  20 ea           S/L Clamshells    rtb 2x10  red  1x20  supine   green  1x20  supine   green  1x20  supine   green  1x20           Step Ups    6" x20  10" x20  10" x20  10" x20  10" x30            sidestepping       red tb  12ft x6   red tb  12ft x6  green tb  12ft x8            squats leg press        20# x20  30# x20  30# x30                                                                                                                                                                                                                                                           Modalities                                                                         Assessment: Progression of therapeutic exercise program is tolerated well without complaints  Plan: Continue per plan of care

## 2019-03-07 ENCOUNTER — OFFICE VISIT (OUTPATIENT)
Dept: PHYSICAL THERAPY | Facility: CLINIC | Age: 69
End: 2019-03-07
Payer: COMMERCIAL

## 2019-03-07 DIAGNOSIS — M25.552 LEFT HIP PAIN: Primary | ICD-10-CM

## 2019-03-07 PROCEDURE — 97140 MANUAL THERAPY 1/> REGIONS: CPT

## 2019-03-07 PROCEDURE — 97110 THERAPEUTIC EXERCISES: CPT

## 2019-03-07 NOTE — PROGRESS NOTES
Daily Note     Today's date: 3/7/2019  Patient name: Marko Blanco  : 1950  MRN: [id number]  Referring provider: MARCELLUS Thompson  Dx:   Encounter Diagnosis     ICD-10-CM    1  Left hip pain M25 552                 Subjective: Patient reports left hip pain is "on and off" - notes the pain is worst when first standing up from a seated position  Objective: See treatment diary below    Precautions: R JED      Daily Treatment Diary      Manuals  2/5  2/7  2/12  2/26  2/28  3/5  3/7         Long axis distraction  3'  3'  3'  3'  3'  3'  3'         prom  10'  10'  9'  9'  7'  7'  7'         Consider MWM for IR                                               Exercise Diary                        Recumbent bike  7'  7'  8'  10'  10'  10'  10'         Hip fallout ER  20x  20x  5"x20  5"x20  5"x20  5"x10  5"x20         Ball squeeze  5"x20  5"x20  5"x20  NP  NP  NP  NP         bridges  3" 20x  3"x20  5"x20  with ball squeeze  5"x20  with ball squeeze  5"x20 with ball squeeze  5"x20  with ball squeeze  5"x20         Prone hip ir/er  5"x20 ea  5"x20  5"x20  5"x20  NP NP  NP         HS stretch  30"x4  30"x4  30"x4  30"x4  30"x4  30"x4  30"x4         Gluteal stretch  30"x4  30"x4  30"x4  30"x4  30"x4  30"x4  30"x4         Standing slr abd/ext  2x10 ea  2x10 ea  20 ea  20 ea  1 5#   20 ea  2#  20 ea  2#   30 ea         S/L Clamshells    rtb 2x10  red  1x20  supine   green  1x20  supine   green  1x20  supine   green  1x20 supine  green  1x20         Step Ups    6" x20  10" x20  10" x20  10" x20  10" x30  10" x30          sidestepping       red tb  12ft x6   red tb  12ft x6  green tb  12ft x8  green tb  12ft x8          squats leg press        20# x20  30# x20  30# x30  30# x30                                                                                                                                                                                                                                                         Modalities                                                                         Assessment: Therapeutic exercise program is tolerated well without complaints  Plan: Continue per plan of care

## 2019-03-12 ENCOUNTER — OFFICE VISIT (OUTPATIENT)
Dept: PHYSICAL THERAPY | Facility: CLINIC | Age: 69
End: 2019-03-12
Payer: COMMERCIAL

## 2019-03-12 DIAGNOSIS — M25.552 LEFT HIP PAIN: Primary | ICD-10-CM

## 2019-03-12 PROCEDURE — 97110 THERAPEUTIC EXERCISES: CPT

## 2019-03-12 PROCEDURE — 97140 MANUAL THERAPY 1/> REGIONS: CPT

## 2019-03-12 NOTE — PROGRESS NOTES
Daily Note     Today's date: 3/12/2019  Patient name: Az Jimenez  : 1950  MRN: [id number]  Referring provider: MARCELLUS Hatfield  Dx:   Encounter Diagnosis     ICD-10-CM    1  Left hip pain M25 552                 Subjective: Patient reports left hip pain is improving  Patient reports he is up to riding his bike 20 miles  Patient also reports compliance with the home exercise program     Objective: See treatment diary below    Precautions: R JED      Daily Treatment Diary      Manuals  2/5  2/7  2/12  2/26  2/28  3/5  3/7  3/12       Long axis distraction  3'  3'  3'  3'  3'  3'  3'  3'       prom  10'  10'  9'  9'  7'  7'  7'  7'       Consider MWM for IR                                               Exercise Diary                        Recumbent bike  7'  7'  8'  10'  10'  10'  10'  10'       Hip fallout ER  20x  20x  5"x20  5"x20  5"x20  5"x10  5"x20  5"x20       Ball squeeze  5"x20  5"x20  5"x20  NP  NP  NP  NP  NP       bridges  3" 20x  3"x20  5"x20  with ball squeeze  5"x20  with ball squeeze  5"x20 with ball squeeze  5"x20  with ball squeeze  5"x20   with ball   squeeze   5"x20       Prone hip ir/er  5"x20 ea  5"x20  5"x20  5"x20  NP NP  NP  NP       HS stretch  30"x4  30"x4  30"x4  30"x4  30"x4  30"x4  30"x4  30"x4       Gluteal stretch  30"x4  30"x4  30"x4  30"x4  30"x4  30"x4  30"x4  30"x4       Standing slr abd/ext  2x10 ea  2x10 ea  20 ea  20 ea  1 5#   20 ea  2#  20 ea  2#   30 ea 2# 30 ea  B/L       S/L Clamshells    rtb 2x10  red  1x20  supine   green  1x20  supine   green  1x20  supine   green  1x20 supine  green  1x20  supine   green   1x20       Step Ups    6" x20  10" x20  10" x20  10" x20  10" x30  10" x30  10" x20        sidestepping       red tb  12ft x6   red tb  12ft x6  green tb  12ft x8  green tb  12ft x8  green tb   12ft x8        squats leg press        20# x20  30# x20  30# x30  30# x30  40# x30        lateral step ups               10" x20                                                                                                                                                                                                                               Modalities                                                                         Assessment: Progression of therapeutic exercise program is tolerated well without complaints of significant left hip pain  Plan: Continue per plan of care

## 2019-03-14 ENCOUNTER — OFFICE VISIT (OUTPATIENT)
Dept: PHYSICAL THERAPY | Facility: CLINIC | Age: 69
End: 2019-03-14
Payer: COMMERCIAL

## 2019-03-14 DIAGNOSIS — M25.552 LEFT HIP PAIN: Primary | ICD-10-CM

## 2019-03-14 PROCEDURE — 97140 MANUAL THERAPY 1/> REGIONS: CPT

## 2019-03-14 PROCEDURE — 97110 THERAPEUTIC EXERCISES: CPT

## 2019-03-14 NOTE — PROGRESS NOTES
Daily Note     Today's date: 3/14/2019  Patient name: Emely Hairston  : 1950  MRN: [id number]  Referring provider: MARCELLUS Keating  Dx:   Encounter Diagnosis     ICD-10-CM    1  Left hip pain M25 552                 Subjective: Patient reports he biked 22 miles on his road bike yesterday and reports the left hip felt "fine" with the exception of "a few twinges" when getting on and off the bike  Objective: See treatment diary below    Precautions: R JED      Daily Treatment Diary      Manuals  2/5  2/7  2/12  2/26  2/28  3/5  3/7  3/12  3/14     Long axis distraction  3'  3'  3'  3'  3'  3'  3'  3'  3'     prom  10'  10'  9'  9'  7'  7'  7'  7'  7'     Consider MWM for IR                                               Exercise Diary                        Recumbent bike  7'  7'  8'  10'  10'  10'  10'  10'  10'     Hip fallout ER  20x  20x  5"x20  5"x20  5"x20  5"x10  5"x20  5"x20  5"x20     Ball squeeze  5"x20  5"x20  5"x20  NP  NP  NP  NP  NP  NP     bridges  3" 20x  3"x20  5"x20  with ball squeeze  5"x20  with ball squeeze  5"x20 with ball squeeze  5"x20  with ball squeeze  5"x20   with ball   squeeze   5"x20 will ball squeeze  5"x20     Prone hip ir/er  5"x20 ea  5"x20  5"x20  5"x20  NP NP  NP  NP  NP     HS stretch  30"x4  30"x4  30"x4  30"x4  30"x4  30"x4  30"x4  30"x4  30"x4     Gluteal stretch  30"x4  30"x4  30"x4  30"x4  30"x4  30"x4  30"x4  30"x4  30"x4     Standing slr abd/ext  2x10 ea  2x10 ea  20 ea  20 ea  1 5#   20 ea  2#  20 ea  2#   30 ea 2# 30 ea  B/L  2# 30 ea B/L     S/L Clamshells    rtb 2x10  red  1x20  supine   green  1x20  supine   green  1x20  supine   green  1x20 supine  green  1x20  supine   green   1x20  supine   green   1x20     Step Ups    6" x20  10" x20  10" x20  10" x20  10" x30  10" x30  10" x20  10" x20      sidestepping       red tb  12ft x6   red tb  12ft x6  green tb  12ft x8  green tb  12ft x8  green tb   12ft x8  green tb 12ft x8      squats leg press        20# x20  30# x20  30# x30  30# x30  40# x30  40# x30      lateral step ups               10" x20  10" x20                                                                                                                                                                                                                             Modalities                                                                         Assessment: Therapeutic exercise program is tolerated well without complaints of significant left hip pain  Plan: Continue per plan of care

## 2019-03-19 ENCOUNTER — OFFICE VISIT (OUTPATIENT)
Dept: PHYSICAL THERAPY | Facility: CLINIC | Age: 69
End: 2019-03-19
Payer: COMMERCIAL

## 2019-03-19 DIAGNOSIS — M25.552 LEFT HIP PAIN: Primary | ICD-10-CM

## 2019-03-19 PROCEDURE — 97110 THERAPEUTIC EXERCISES: CPT

## 2019-03-19 PROCEDURE — 97140 MANUAL THERAPY 1/> REGIONS: CPT

## 2019-03-19 NOTE — PROGRESS NOTES
Daily Note     Today's date: 3/19/2019  Patient name: Ambrocio Chau  : 1950  MRN: [id number]  Referring provider: MARCELLUS Brewer  Dx:   Encounter Diagnosis     ICD-10-CM    1  Left hip pain M25 552                 Subjective: Patient reports left hip pain has been mild so far today - patient states, "I have nothing really to report "    Objective: See treatment diary below    Precautions: R JED         Daily Treatment Diary      Manuals  2/5  2/7  2/12  2/26  2/28  3/5  3/7  3/12  3/14  3/19   Long axis distraction  3'  3'  3'  3'  3'  3'  3'  3'  3'  3'   prom  10'  10'  9'  9'  7'  7'  7'  7'  7'  7'   Consider MWM for IR                                               Exercise Diary                        Recumbent bike  7'  7'  8'  10'  10'  10'  10'  10'  10'  10'   Hip fallout ER  20x  20x  5"x20  5"x20  5"x20  5"x10  5"x20  5"x20  5"x20  5"x20   Ball squeeze  5"x20  5"x20  5"x20  NP  NP  NP  NP  NP  NP  NP   bridges  3" 20x  3"x20  5"x20  with ball squeeze  5"x20  with ball squeeze  5"x20 with ball squeeze  5"x20  with ball squeeze  5"x20   with ball   squeeze   5"x20 will ball squeeze  5"x20  will ball squeeze  5"x20   Prone hip ir/er  5"x20 ea  5"x20  5"x20  5"x20  NP NP  NP  NP  NP  NP   HS stretch  30"x4  30"x4  30"x4  30"x4  30"x4  30"x4  30"x4  30"x4  30"x4  30"x4   Gluteal stretch  30"x4  30"x4  30"x4  30"x4  30"x4  30"x4  30"x4  30"x4  30"x4  30"x4   Standing slr abd/ext  2x10 ea  2x10 ea  20 ea  20 ea  1 5#   20 ea  2#  20 ea  2#   30 ea 2# 30 ea  B/L  2# 30 ea B/L  2 5# 30 ea   S/L Clamshells    rtb 2x10  red  1x20  supine   green  1x20  supine   green  1x20  supine   green  1x20 supine  green  1x20  supine   green   1x20  supine   green   1x20  supine   green  1x20   Step Ups    6" x20  10" x20  10" x20  10" x20  10" x30  10" x30  10" x20  10" x20  10"x20    sidestepping       red tb  12ft x6   red tb  12ft x6  green tb  12ft x8  green tb  12ft x8  green tb   12ft x8  green tb 12ft x8  green tb  12ft x8    squats leg press        20# x20  30# x20  30# x30  30# x30  40# x30  40# x30  40# x30    lateral step ups               10" x20  10" x20  10" x20                                                                                                                                                                                                                           Modalities                                                                         Assessment: Therapeutic exercise program is tolerated well without complaints of significant left hip pain  Left hip internal rotation ROM improves with the manual stretching today  Plan: Continue per plan of care

## 2019-03-21 ENCOUNTER — EVALUATION (OUTPATIENT)
Dept: PHYSICAL THERAPY | Facility: CLINIC | Age: 69
End: 2019-03-21
Payer: COMMERCIAL

## 2019-03-21 DIAGNOSIS — M25.552 LEFT HIP PAIN: Primary | ICD-10-CM

## 2019-03-21 PROCEDURE — 97110 THERAPEUTIC EXERCISES: CPT | Performed by: PHYSICAL THERAPIST

## 2019-03-21 PROCEDURE — 97140 MANUAL THERAPY 1/> REGIONS: CPT | Performed by: PHYSICAL THERAPIST

## 2019-03-26 ENCOUNTER — OFFICE VISIT (OUTPATIENT)
Dept: PHYSICAL THERAPY | Facility: CLINIC | Age: 69
End: 2019-03-26
Payer: COMMERCIAL

## 2019-03-26 DIAGNOSIS — M25.552 LEFT HIP PAIN: Primary | ICD-10-CM

## 2019-03-26 PROCEDURE — 97110 THERAPEUTIC EXERCISES: CPT | Performed by: PHYSICAL THERAPIST

## 2019-03-26 PROCEDURE — 97140 MANUAL THERAPY 1/> REGIONS: CPT | Performed by: PHYSICAL THERAPIST

## 2019-03-26 NOTE — PROGRESS NOTES
Daily Note     Today's date: 3/26/2019  Patient name: Az Jimenez  : 1950  MRN: [id number]  Referring provider: MARCELLUS Hatfield  Dx:   Encounter Diagnosis     ICD-10-CM    1  Left hip pain M25 552                   Subjective: Pt reports that he was able to utilize black tb at home with no complaints  Reports that he continues to have pain when descending stairs but that he is better able ride his bike  Objective: See treatment diary below      Precautions: R JED       Daily Treatment Diary      Manuals 3/21 3/26         3/19   Long axis distraction 3' 3'         3'   prom 8' 8'         7'   Consider MWM for IR                             Exercise Diary               Recumbent bike 10' 10'         10'   Hip fallout ER           5"x20   Ball squeeze           NP   bridges           will ball squeeze  5"x20   Prone hip ir/er           NP   HS stretch 30"x4 30"x4         30"x4   Gluteal stretch 30"x4 30"x4         30"x4   Standing slr abd/ext 4#x30ea          2 5# 30 ea   S/L Clamshells Black tb x30 x30         supine   green  1x20   Step Ups 10"x20 10"x20         10"x20    sidestepping Black tb 12ft x10 [id number]         green tb  12ft x8    squats leg press 40#x30 50#x30         40# x30    lateral step ups 10"x20          10" x20    monster walks  nv  black tb 12ft x8                    crossover step ups nv 10"x20                                                                                                                                                                                           Modalities                                                                             Assessment: progressed resistance as listed with no complaints,  Added new therex as listed, pt requires verbal cues for proper form  Plan: Continue per plan of care

## 2019-03-28 ENCOUNTER — OFFICE VISIT (OUTPATIENT)
Dept: PHYSICAL THERAPY | Facility: CLINIC | Age: 69
End: 2019-03-28
Payer: COMMERCIAL

## 2019-03-28 DIAGNOSIS — M25.552 LEFT HIP PAIN: Primary | ICD-10-CM

## 2019-03-28 PROCEDURE — 97110 THERAPEUTIC EXERCISES: CPT | Performed by: PHYSICAL THERAPIST

## 2019-03-28 PROCEDURE — 97140 MANUAL THERAPY 1/> REGIONS: CPT | Performed by: PHYSICAL THERAPIST

## 2019-03-28 NOTE — PROGRESS NOTES
Daily Note     Today's date: 3/28/2019  Patient name: Marilee Mixon  : 1950  MRN: [id number]  Referring provider: MARCELLUS Barfield  Dx:   Encounter Diagnosis     ICD-10-CM    1  Left hip pain M25 552                 Subjective: Patient reports that his hip is most bothersome with increased hip flexion movements (I e  Mounting his bike)  Objective: See treatment diary below      Assessment: Patient continues to tolerate treatment well with no aggravation of sx noted post treatment  Increased contralateral lateral flexion of the trunk observed during side stepping activity, which patient was unable to fully correct with cueing  Plan: Continue per plan of care       Precautions: R JED       Daily Treatment Diary      Manuals 3/21 3/26 3/28        3/19   Long axis distraction 3' 3' 3'        3'   prom 8' 8' 8'        7'   Consider MWM for IR                             Exercise Diary               Recumbent bike 10' 10' 10'        10'   Hip fallout ER           5"x20   Ball squeeze           NP   bridges           will ball squeeze  5"x20   Prone hip ir/er           NP   HS stretch 30"x4 30"x4 30"x4        30"x4   Gluteal stretch 30"x4 30"x4 30"x4        30"x4   Standing slr abd/ext 4#x30ea  4# 30ea        2 5# 30 ea   S/L Clamshells Black tb x30 x30 Bk TB x30        supine   green  1x20   Step Ups 10"x20 10"x20 10" x20        10"x20    sidestepping Black tb 12ft x10 [id number] bk TB 12'x10        green tb  12ft x8    squats leg press 40#x30 50#x30 50# x30        40# x30    lateral step ups 10"x20  10" x20        10" x20    monster walks  nv  black tb 12ft x8 bk tb 12'x8                  crossover step ups nv 10"x20  10" x20                                                                                                                                                                                         Modalities

## 2019-04-02 ENCOUNTER — OFFICE VISIT (OUTPATIENT)
Dept: PHYSICAL THERAPY | Facility: CLINIC | Age: 69
End: 2019-04-02
Payer: COMMERCIAL

## 2019-04-02 DIAGNOSIS — M25.552 LEFT HIP PAIN: Primary | ICD-10-CM

## 2019-04-02 PROCEDURE — 97110 THERAPEUTIC EXERCISES: CPT

## 2019-04-02 PROCEDURE — 97530 THERAPEUTIC ACTIVITIES: CPT

## 2019-04-02 PROCEDURE — 97140 MANUAL THERAPY 1/> REGIONS: CPT

## 2019-04-04 ENCOUNTER — OFFICE VISIT (OUTPATIENT)
Dept: PHYSICAL THERAPY | Facility: CLINIC | Age: 69
End: 2019-04-04
Payer: COMMERCIAL

## 2019-04-04 DIAGNOSIS — M25.552 LEFT HIP PAIN: Primary | ICD-10-CM

## 2019-04-04 PROCEDURE — 97530 THERAPEUTIC ACTIVITIES: CPT

## 2019-04-04 PROCEDURE — 97110 THERAPEUTIC EXERCISES: CPT

## 2019-04-04 PROCEDURE — 97140 MANUAL THERAPY 1/> REGIONS: CPT

## 2019-04-09 ENCOUNTER — OFFICE VISIT (OUTPATIENT)
Dept: PHYSICAL THERAPY | Facility: CLINIC | Age: 69
End: 2019-04-09
Payer: COMMERCIAL

## 2019-04-09 DIAGNOSIS — M25.552 LEFT HIP PAIN: Primary | ICD-10-CM

## 2019-04-09 PROCEDURE — 97530 THERAPEUTIC ACTIVITIES: CPT | Performed by: PHYSICAL THERAPIST

## 2019-04-09 PROCEDURE — 97110 THERAPEUTIC EXERCISES: CPT | Performed by: PHYSICAL THERAPIST

## 2019-04-11 ENCOUNTER — APPOINTMENT (OUTPATIENT)
Dept: PHYSICAL THERAPY | Facility: CLINIC | Age: 69
End: 2019-04-11
Payer: COMMERCIAL

## 2019-08-20 NOTE — PROGRESS NOTES
Assessment/Plan:    No problem-specific Assessment & Plan notes found for this encounter  Diagnoses and all orders for this visit:    Allergic reaction to bee sting  -     methylPREDNISolone 4 MG tablet therapy pack; Use as directed on package    Screening for colon cancer  -     Ambulatory referral to Gastroenterology; Future          Subjective:  Patient complain of being stung by something on Sunday and Monday  Same leg  Referral to gastro pending     Health Maintenance Due   Topic Date Due    Hepatitis C Screening  1950    CRC Screening: Colonoscopy  1950    BMI: Followup Plan  05/10/1970    DTaP,Tdap,and Td Vaccines (1 - Tdap) 05/10/1973    Pneumococcal Vaccine: 65+ Years (1 of 2 - PCV13) 05/10/2017    PT PLAN OF CARE  04/20/2019          Patient ID: Valencia Magallon is a 81 y o  male  HPI  Here for an acute  Sustained 2 insect bites ( ankle/ above knee) - possible bee -same leg ( left) 24 hrs apart while biking  Bikes daily  Redness/swelling/ tightness from ankle to knee  NO sob  The following portions of the patient's history were reviewed and updated as appropriate: allergies, past social history, past surgical history and problem list     Review of Systems   Constitutional: Negative  Respiratory: Negative  Skin: Positive for rash  Neurological: Negative for weakness and numbness  Objective:  BMI Counseling: Body mass index is 25 66 kg/m²  Discussed the patient's BMI with him  The BMI is above average  BMI counseling and education was provided to the patient  Nutrition recommendations include 3-5 servings of fruits/vegetables daily and reducing fast food intake        /62 (BP Location: Left arm, Patient Position: Sitting, Cuff Size: Standard)   Pulse 85   Temp 98 2 °F (36 8 °C) (Oral)   Resp 17   Ht 5' 11" (1 803 m)   Wt 83 5 kg (184 lb)   SpO2 98%   BMI 25 66 kg/m²          Physical Exam   Constitutional: He is oriented to person, place, and time  He appears well-developed and well-nourished  No distress  HENT:   Head: Atraumatic  Eyes: Conjunctivae are normal    Pulmonary/Chest: Effort normal  No respiratory distress  Neurological: He is alert and oriented to person, place, and time  Skin: Skin is warm and dry  There is erythema  One bite on left anchilles area w/ resolving swelling/ redness left ankle to left lower leg  One bite above knee w/ generalized knee swelling - no observable knee effusion/ no redness     Psychiatric: He has a normal mood and affect

## 2019-08-21 ENCOUNTER — OFFICE VISIT (OUTPATIENT)
Dept: FAMILY MEDICINE CLINIC | Facility: CLINIC | Age: 69
End: 2019-08-21
Payer: COMMERCIAL

## 2019-08-21 VITALS
RESPIRATION RATE: 17 BRPM | WEIGHT: 184 LBS | SYSTOLIC BLOOD PRESSURE: 140 MMHG | OXYGEN SATURATION: 98 % | HEART RATE: 85 BPM | HEIGHT: 71 IN | TEMPERATURE: 98.2 F | DIASTOLIC BLOOD PRESSURE: 62 MMHG | BODY MASS INDEX: 25.76 KG/M2

## 2019-08-21 DIAGNOSIS — T63.441A ALLERGIC REACTION TO BEE STING: Primary | ICD-10-CM

## 2019-08-21 DIAGNOSIS — Z12.11 SCREENING FOR COLON CANCER: ICD-10-CM

## 2019-08-21 PROCEDURE — 3008F BODY MASS INDEX DOCD: CPT | Performed by: NURSE PRACTITIONER

## 2019-08-21 PROCEDURE — 99213 OFFICE O/P EST LOW 20 MIN: CPT | Performed by: NURSE PRACTITIONER

## 2019-08-21 PROCEDURE — 1036F TOBACCO NON-USER: CPT | Performed by: NURSE PRACTITIONER

## 2019-08-21 PROCEDURE — 1160F RVW MEDS BY RX/DR IN RCRD: CPT | Performed by: NURSE PRACTITIONER

## 2019-08-21 RX ORDER — METHYLPREDNISOLONE 4 MG/1
TABLET ORAL
Qty: 21 EACH | Refills: 0 | Status: SHIPPED | OUTPATIENT
Start: 2019-08-21 | End: 2019-09-09 | Stop reason: ALTCHOICE

## 2019-08-21 NOTE — PATIENT INSTRUCTIONS
Insect Bite or Sting   WHAT YOU NEED TO KNOW:   Most insect bites and stings are not dangerous and go away without treatment  Your symptoms may be mild, or you may develop anaphylaxis  Anaphylaxis is a sudden, life-threatening reaction that needs immediate treatment  Common examples of insects that bite or sting are bees, ticks, mosquitoes, spiders, and ants  Insect bites or stings can lead to diseases such as malaria, West Nile virus, Lyme disease, or [place] Spotted Fever  DISCHARGE INSTRUCTIONS:   Call 911 for signs or symptoms of anaphylaxis,  such as trouble breathing, swelling in your mouth or throat, or wheezing  You may also have itching, a rash, hives, or feel like you are going to faint  Return to the emergency department if:   · You are stung on your tongue or in your throat  · A white area forms around the bite  · You are sweating badly or have body pain  · You think you were bitten or stung by a poisonous insect  Contact your healthcare provider if:   · You have a fever  · The area becomes red, warm, tender, and swollen beyond the area of the bite or sting  · You have questions or concerns about your condition or care  Medicines:   · Antihistamines  decrease itching and rash  · Epinephrine  is used to treat severe allergic reactions such as anaphylaxis  · Take your medicine as directed  Contact your healthcare provider if you think your medicine is not helping or if you have side effects  Tell him of her if you are allergic to any medicine  Keep a list of the medicines, vitamins, and herbs you take  Include the amounts, and when and why you take them  Bring the list or the pill bottles to follow-up visits  Carry your medicine list with you in case of an emergency  Steps to take for signs or symptoms of anaphylaxis:   · Immediately  give 1 shot of epinephrine only into the outer thigh muscle  · Leave the shot in place  as directed   Your healthcare provider may recommend you leave it in place for up to 10 seconds before you remove it  This helps make sure all of the epinephrine is delivered  · Call 911 and go to the emergency department,  even if the shot improved symptoms  Do not drive yourself  Bring the used epinephrine shot with you  Safety precautions to take if you are at risk for anaphylaxis:   · Keep 2 shots of epinephrine with you at all times  You may need a second shot, because epinephrine only works for about 20 minutes and symptoms may return  Your healthcare provider can show you and family members how to give the shot  Check the expiration date every month and replace it before it expires  · Create an action plan  Your healthcare provider can help you create a written plan that explains the allergy and an emergency plan to treat a reaction  The plan explains when to give a second epinephrine shot if symptoms return or do not improve after the first  Give copies of the action plan and emergency instructions to family members, work and school staff, and  providers  Show them how to give a shot of epinephrine  · Carry medical alert identification  Wear medical alert jewelry or carry a card that says you have an insect allergy  Ask your healthcare provider where to get these items  If an insect bites or stings you:   · Remove the stinger  Scrape the stinger out with your fingernail, edge of a credit card, or a knife blade  Do not squeeze the wound  Gently wash the area with soap and water  · Remove the tick  Ticks must be removed as soon as possible so you do not get diseases passed through tick bites  Ask your healthcare provider for more information on tick bites and how to remove ticks  Care for a bite or sting wound:   · Elevate the affected area  Prop the wound above the level of your heart, if possible  Elevate the area for 10 to 20 minutes each hour or as directed by your healthcare provider  · Use compresses    Soak a clean washcloth in cold water, wring it out, and put it on the bite or sting  Use the compress for 10 to 20 minutes each hour or as directed by your healthcare provider  After 24 to 48 hours, change to warm compresses  · Apply a paste  Add water to baking soda to make a thick paste  Put the paste on the area for 5 minutes  Rinse gently to remove the paste  Prevent another insect bite or sting:   · Do not wear bright-colored or flower-print clothing when you plan to spend time outdoors  Do not use hairspray, perfumes, or aftershave  · Do not leave food out  · Empty any standing water and wash container with soap and water every 2 days  · Put screens on all open windows and doors  · Put insect repellent that contains DEET on skin that is showing when you go outside  Put insect repellent at the top of your boots, bottom of pant legs, and sleeve cuffs  Wear long sleeves, pants, and shoes  · Use citronella candles outdoors to help keep mosquitoes away  Put a tick and flea collar on pets  Follow up with your healthcare provider as directed:  Write down your questions so you remember to ask them during your visits  © 2017 [employer] Information is for End User's use only and may not be sold, redistributed or otherwise used for commercial purposes  All illustrations and images included in CareNotes® are the copyrighted property of [employer]  or [employer]  The above information is an  only  It is not intended as medical advice for individual conditions or treatments  Talk to your doctor, nurse or pharmacist before following any medical regimen to see if it is safe and effective for you

## 2019-09-03 ENCOUNTER — APPOINTMENT (OUTPATIENT)
Dept: LAB | Facility: HOSPITAL | Age: 69
End: 2019-09-03
Payer: COMMERCIAL

## 2019-09-03 DIAGNOSIS — Z13.6 SCREENING FOR CARDIOVASCULAR CONDITION: ICD-10-CM

## 2019-09-03 DIAGNOSIS — Z11.59 ENCOUNTER FOR HEPATITIS C SCREENING TEST FOR LOW RISK PATIENT: ICD-10-CM

## 2019-09-03 DIAGNOSIS — R73.01 IFG (IMPAIRED FASTING GLUCOSE): ICD-10-CM

## 2019-09-03 LAB
ALBUMIN SERPL BCP-MCNC: 3.6 G/DL (ref 3.5–5)
ALP SERPL-CCNC: 94 U/L (ref 46–116)
ALT SERPL W P-5'-P-CCNC: 24 U/L (ref 12–78)
ANION GAP SERPL CALCULATED.3IONS-SCNC: 3 MMOL/L (ref 4–13)
AST SERPL W P-5'-P-CCNC: 17 U/L (ref 5–45)
BILIRUB SERPL-MCNC: 0.57 MG/DL (ref 0.2–1)
BUN SERPL-MCNC: 26 MG/DL (ref 5–25)
CALCIUM SERPL-MCNC: 8.9 MG/DL (ref 8.3–10.1)
CHLORIDE SERPL-SCNC: 106 MMOL/L (ref 100–108)
CHOLEST SERPL-MCNC: 194 MG/DL (ref 50–200)
CO2 SERPL-SCNC: 29 MMOL/L (ref 21–32)
CREAT SERPL-MCNC: 0.92 MG/DL (ref 0.6–1.3)
EST. AVERAGE GLUCOSE BLD GHB EST-MCNC: 134 MG/DL
GFR SERPL CREATININE-BSD FRML MDRD: 86 ML/MIN/1.73SQ M
GLUCOSE P FAST SERPL-MCNC: 126 MG/DL (ref 65–99)
HBA1C MFR BLD: 6.3 % (ref 4.2–6.3)
HCV AB SER QL: NORMAL
HDLC SERPL-MCNC: 53 MG/DL (ref 40–60)
LDLC SERPL CALC-MCNC: 116 MG/DL (ref 0–100)
NONHDLC SERPL-MCNC: 141 MG/DL
POTASSIUM SERPL-SCNC: 4.9 MMOL/L (ref 3.5–5.3)
PROT SERPL-MCNC: 7.2 G/DL (ref 6.4–8.2)
SODIUM SERPL-SCNC: 138 MMOL/L (ref 136–145)
TRIGL SERPL-MCNC: 125 MG/DL

## 2019-09-03 PROCEDURE — 80061 LIPID PANEL: CPT

## 2019-09-03 PROCEDURE — 80053 COMPREHEN METABOLIC PANEL: CPT

## 2019-09-03 PROCEDURE — 86803 HEPATITIS C AB TEST: CPT

## 2019-09-03 PROCEDURE — 36415 COLL VENOUS BLD VENIPUNCTURE: CPT

## 2019-09-03 PROCEDURE — 83036 HEMOGLOBIN GLYCOSYLATED A1C: CPT

## 2019-09-09 ENCOUNTER — OFFICE VISIT (OUTPATIENT)
Dept: FAMILY MEDICINE CLINIC | Facility: CLINIC | Age: 69
End: 2019-09-09
Payer: COMMERCIAL

## 2019-09-09 VITALS
BODY MASS INDEX: 25.68 KG/M2 | HEART RATE: 84 BPM | SYSTOLIC BLOOD PRESSURE: 138 MMHG | DIASTOLIC BLOOD PRESSURE: 72 MMHG | HEIGHT: 71 IN | RESPIRATION RATE: 16 BRPM | TEMPERATURE: 98.6 F | OXYGEN SATURATION: 99 % | WEIGHT: 183.4 LBS

## 2019-09-09 DIAGNOSIS — Z23 NEED FOR INFLUENZA VACCINATION: ICD-10-CM

## 2019-09-09 DIAGNOSIS — Z23 NEED FOR PNEUMOCOCCAL VACCINATION: ICD-10-CM

## 2019-09-09 DIAGNOSIS — R73.01 IFG (IMPAIRED FASTING GLUCOSE): ICD-10-CM

## 2019-09-09 DIAGNOSIS — Z00.00 ANNUAL PHYSICAL EXAM: Primary | ICD-10-CM

## 2019-09-09 PROCEDURE — 90670 PCV13 VACCINE IM: CPT

## 2019-09-09 PROCEDURE — 90662 IIV NO PRSV INCREASED AG IM: CPT

## 2019-09-09 PROCEDURE — 90472 IMMUNIZATION ADMIN EACH ADD: CPT

## 2019-09-09 PROCEDURE — 99397 PER PM REEVAL EST PAT 65+ YR: CPT | Performed by: NURSE PRACTITIONER

## 2019-09-09 PROCEDURE — 90471 IMMUNIZATION ADMIN: CPT

## 2019-09-09 RX ORDER — MELATONIN
1000 DAILY
COMMUNITY

## 2019-09-09 NOTE — PROGRESS NOTES
ADULT ANNUAL PHYSICAL  [facility]    NAME: Yury Field  AGE: 81 y o  SEX: male  : 1950     DATE: 2019     Assessment and Plan:     Problem List Items Addressed This Visit        Endocrine    IFG (impaired fasting glucose)    Relevant Orders    Hemoglobin K4Z    Basic metabolic panel      Other Visit Diagnoses     Need for influenza vaccination    -  Primary    Relevant Orders    PREFERRED: influenza vaccine, [id number], high-dose, PF 0 5 mL, for patients 65 yr+ (FLUZONE HIGH-DOSE) (Completed)    Need for pneumococcal vaccination        Relevant Orders    PNEUMOCOCCAL CONJUGATE VACCINE 13-VALENT GREATER THAN 6 MONTHS (Completed)    Adult general medical exam        Annual physical exam          Healthy exam - labs stable - keeps active riding his bike on most days  Immunizations and preventive care screenings were discussed with patient today and updated  IFG - stable  Appropriate education was printed on patient's after visit summary  Counseling:  · BMI Counseling: Body mass index is 25 58 kg/m²  Discussed the patient's BMI with him  The BMI is above average  BMI counseling and education was provided to the patient  Nutrition recommendations include 3-5 servings of fruits/vegetables daily  No follow-ups on file  Chief Complaint:     Chief Complaint   Patient presents with    Physical Exam      History of Present Illness:     Adult Annual Physical   Patient here for a comprehensive physical exam  The patient reports no problems  Diet and Physical Activity  · Diet/Nutrition: well balanced diet, heart healthy (low sodium) diet, consuming 3-5 servings of fruits/vegetables daily and adequate fiber intake  · Exercise: moderate cardiovascular exercise, 5-7 times a week on average and less than 30 minutes on average        Depression Screening  PHQ-9 Depression Screening    PHQ-9:    Frequency of the following problems over the past two weeks: Little interest or pleasure in doing things:  0 - not at all  Feeling down, depressed, or hopeless:  0 - not at all  PHQ-2 Score:  0       General Health  · Sleep: sleeps well  · Hearing: normal - bilateral   · Vision: no vision problems and wears glasses  · Dental: regular dental visits   Health  · Symptoms include: none     Review of Systems:     Review of Systems   Constitutional: Negative for activity change and appetite change  HENT: Negative  Eyes: Negative  Respiratory: Negative  Cardiovascular: Negative  Negative for chest pain  Gastrointestinal: Negative  Endocrine: Negative  Negative for cold intolerance  Genitourinary: Negative  Musculoskeletal: Negative  Skin: Negative  Allergic/Immunologic: Negative  Neurological: Negative  Hematological: Negative  Psychiatric/Behavioral: Negative  All other systems reviewed and are negative  Past Medical History:     Past Medical History:   Diagnosis Date    Femur fracture, right ([facility] [address] ) 2015      Past Surgical History:     Past Surgical History:   Procedure Laterality Date    TOTAL HIP ARTHROPLASTY        Family History:     Family History   Problem Relation Age of Onset    Heart disease Mother     Thyroid disease Mother     Hypertension Mother     Pancreatic cancer Father     Lung cancer Maternal Aunt       Social History:     Social History     Socioeconomic History    Marital status: /Civil Union     Spouse name: None    Number of children: 1    Years of education: None    Highest education level: None   Occupational History    Occupation: Retired    Social Needs    Financial resource strain: Not hard at all   Knippa-Raymon insecurity:     Worry: Never true     Inability: Never true    Transportation needs:     Medical: No     Non-medical: No   Tobacco Use    Smoking status: Never Smoker    Smokeless tobacco: Never Used   Substance and Sexual Activity    Alcohol use:  Yes Comment: occasionally    Drug use: No    Sexual activity: Yes   Lifestyle    Physical activity:     Days per week: 7 days     Minutes per session: 80 min    Stress: Not at all   Relationships    Social connections:     Talks on phone: More than three times a week     Gets together: More than three times a week     Attends Alevism service: Never     Active member of club or organization: Yes     Attends meetings of clubs or organizations: Never     Relationship status:     Intimate partner violence:     Fear of current or ex partner: No     Emotionally abused: No     Physically abused: No     Forced sexual activity: No   Other Topics Concern    None   Social History Narrative        Non smoker    No caffeine use    Weekly alcohol cosumption    No recreational drug use    Always wears seatbelts    Retired    Lives with wife    No living will    No Alevism preference      Current Medications:     Current Outpatient Medications   Medication Sig Dispense Refill    cholecalciferol (VITAMIN D3) 1,000 units tablet Take 1,000 Units by mouth daily       No current facility-administered medications for this visit  Allergies:     No Known Allergies   Physical Exam:     /72 (BP Location: Left arm, Patient Position: Sitting, Cuff Size: Adult)   Pulse 84   Temp 98 6 °F (37 °C) (Oral)   Resp 16   Ht 5' 11" (1 803 m)   Wt 83 2 kg (183 lb 6 4 oz)   SpO2 99%   BMI 25 58 kg/m²     Physical Exam   Constitutional: He is oriented to person, place, and time  He appears well-developed and well-nourished  HENT:   Head: Normocephalic  Right Ear: Tympanic membrane and external ear normal  No decreased hearing is noted  Left Ear: Tympanic membrane and external ear normal  No decreased hearing is noted  Mouth/Throat: Oropharynx is clear and moist    Eyes: Pupils are equal, round, and reactive to light  Conjunctivae are normal    Neck: Normal range of motion  Neck supple   No thyromegaly present  Cardiovascular: Normal rate, regular rhythm, normal heart sounds and intact distal pulses  No murmur heard  Pulmonary/Chest: Effort normal and breath sounds normal  No respiratory distress  Abdominal: Soft  Bowel sounds are normal    Musculoskeletal: Normal range of motion  Lymphadenopathy:     He has no cervical adenopathy  Neurological: He is alert and oriented to person, place, and time  He has normal reflexes  No cranial nerve deficit  Skin: Skin is warm and dry  Psychiatric: He has a normal mood and affect   His behavior is normal  Judgment and thought content normal        Susana Martinez, [address]

## 2019-09-09 NOTE — PATIENT INSTRUCTIONS

## 2020-05-12 ENCOUNTER — APPOINTMENT (EMERGENCY)
Dept: RADIOLOGY | Facility: HOSPITAL | Age: 70
End: 2020-05-12
Payer: COMMERCIAL

## 2020-05-12 ENCOUNTER — HOSPITAL ENCOUNTER (EMERGENCY)
Facility: HOSPITAL | Age: 70
Discharge: HOME | End: 2020-05-12
Attending: EMERGENCY MEDICINE | Admitting: EMERGENCY MEDICINE
Payer: COMMERCIAL

## 2020-05-12 VITALS
BODY MASS INDEX: 25.9 KG/M2 | OXYGEN SATURATION: 100 % | TEMPERATURE: 98.7 F | RESPIRATION RATE: 16 BRPM | HEIGHT: 71 IN | HEART RATE: 74 BPM | DIASTOLIC BLOOD PRESSURE: 68 MMHG | SYSTOLIC BLOOD PRESSURE: 157 MMHG | WEIGHT: 185 LBS
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
## 2020-05-12 DIAGNOSIS — S01.01XA LACERATION OF SCALP, INITIAL ENCOUNTER: Primary | ICD-10-CM

## 2020-05-12 LAB
ANION GAP SERPL CALCULATED.3IONS-SCNC: 5 MMOL/L (ref 4–13)
BUN SERPL-MCNC: 24 MG/DL (ref 5–25)
CALCIUM SERPL-MCNC: 8.6 MG/DL (ref 8.3–10.1)
CHLORIDE SERPL-SCNC: 111 MMOL/L (ref 100–108)
CO2 SERPL-SCNC: 25 MMOL/L (ref 21–32)
CREAT SERPL-MCNC: 0.81 MG/DL (ref 0.6–1.3)
GFR SERPL CREATININE-BSD FRML MDRD: 91 ML/MIN/1.73SQ M
GLUCOSE SERPL-MCNC: 144 MG/DL (ref 65–140)
HCT VFR BLD AUTO: 44.2 % (ref 36.5–49.3)
HGB BLD-MCNC: 14.8 G/DL (ref 12–17)
POTASSIUM SERPL-SCNC: 4.1 MMOL/L (ref 3.5–5.3)
SODIUM SERPL-SCNC: 141 MMOL/L (ref 136–145)

## 2020-05-12 PROCEDURE — 99284 EMERGENCY DEPT VISIT MOD MDM: CPT

## 2020-05-12 PROCEDURE — 12004 RPR S/N/AX/GEN/TRK7.6-12.5CM: CPT | Performed by: EMERGENCY MEDICINE

## 2020-05-12 PROCEDURE — 99284 EMERGENCY DEPT VISIT MOD MDM: CPT | Performed by: EMERGENCY MEDICINE

## 2020-05-12 PROCEDURE — 85018 HEMOGLOBIN: CPT | Performed by: EMERGENCY MEDICINE

## 2020-05-12 PROCEDURE — 36415 COLL VENOUS BLD VENIPUNCTURE: CPT | Performed by: EMERGENCY MEDICINE

## 2020-05-12 PROCEDURE — 90715 TDAP VACCINE 7 YRS/> IM: CPT | Performed by: EMERGENCY MEDICINE

## 2020-05-12 PROCEDURE — 85014 HEMATOCRIT: CPT | Performed by: EMERGENCY MEDICINE

## 2020-05-12 PROCEDURE — 70450 CT HEAD/BRAIN W/O DYE: CPT

## 2020-05-12 PROCEDURE — 80048 BASIC METABOLIC PNL TOTAL CA: CPT | Performed by: EMERGENCY MEDICINE

## 2020-05-12 PROCEDURE — 90471 IMMUNIZATION ADMIN: CPT

## 2020-05-12 PROCEDURE — 72125 CT NECK SPINE W/O DYE: CPT

## 2020-05-12 PROCEDURE — 71046 X-RAY EXAM CHEST 2 VIEWS: CPT

## 2020-05-12 RX ORDER — ACETAMINOPHEN 325 MG/1
975 TABLET ORAL ONCE
Status: COMPLETED | OUTPATIENT
Start: 2020-05-12 | End: 2020-05-12

## 2020-05-12 RX ORDER — LIDOCAINE HYDROCHLORIDE AND EPINEPHRINE 10; 10 MG/ML; UG/ML
10 INJECTION, SOLUTION INFILTRATION; PERINEURAL ONCE
Status: COMPLETED | OUTPATIENT
Start: 2020-05-12 | End: 2020-05-12

## 2020-05-12 RX ADMIN — TETANUS TOXOID, REDUCED DIPHTHERIA TOXOID AND ACELLULAR PERTUSSIS VACCINE, ADSORBED 0.5 ML: 5; 2.5; 8; 8; 2.5 SUSPENSION INTRAMUSCULAR at 12:04

## 2020-05-12 RX ADMIN — LIDOCAINE HYDROCHLORIDE,EPINEPHRINE BITARTRATE 10 ML: 10; .01 INJECTION, SOLUTION INFILTRATION; PERINEURAL at 12:48

## 2020-05-12 RX ADMIN — ACETAMINOPHEN 975 MG: 325 TABLET ORAL at 12:04

## 2020-05-19 ENCOUNTER — OFFICE VISIT (OUTPATIENT)
Dept: FAMILY MEDICINE CLINIC | Facility: CLINIC | Age: 70
End: 2020-05-19
Payer: COMMERCIAL

## 2020-05-19 VITALS
TEMPERATURE: 99.1 F | HEIGHT: 71 IN | HEART RATE: 74 BPM | BODY MASS INDEX: 27.8 KG/M2 | RESPIRATION RATE: 16 BRPM | SYSTOLIC BLOOD PRESSURE: 138 MMHG | WEIGHT: 198.6 LBS | DIASTOLIC BLOOD PRESSURE: 70 MMHG | OXYGEN SATURATION: 98 %

## 2020-05-19 DIAGNOSIS — Z48.02 VISIT FOR SUTURE REMOVAL: Primary | ICD-10-CM

## 2020-05-19 DIAGNOSIS — R73.01 IFG (IMPAIRED FASTING GLUCOSE): ICD-10-CM

## 2020-05-19 DIAGNOSIS — Z13.220 SCREENING CHOLESTEROL LEVEL: ICD-10-CM

## 2020-05-19 PROCEDURE — 1036F TOBACCO NON-USER: CPT | Performed by: FAMILY MEDICINE

## 2020-05-19 PROCEDURE — 3008F BODY MASS INDEX DOCD: CPT | Performed by: FAMILY MEDICINE

## 2020-05-19 PROCEDURE — 1100F PTFALLS ASSESS-DOCD GE2>/YR: CPT | Performed by: FAMILY MEDICINE

## 2020-05-19 PROCEDURE — 4040F PNEUMOC VAC/ADMIN/RCVD: CPT | Performed by: FAMILY MEDICINE

## 2020-05-19 PROCEDURE — 3288F FALL RISK ASSESSMENT DOCD: CPT | Performed by: FAMILY MEDICINE

## 2020-05-19 PROCEDURE — 99213 OFFICE O/P EST LOW 20 MIN: CPT | Performed by: FAMILY MEDICINE

## 2020-05-19 PROCEDURE — 1160F RVW MEDS BY RX/DR IN RCRD: CPT | Performed by: FAMILY MEDICINE

## 2020-09-16 ENCOUNTER — TRANSCRIBE ORDERS (OUTPATIENT)
Dept: LAB | Facility: HOSPITAL | Age: 70
End: 2020-09-16

## 2020-09-16 ENCOUNTER — LAB (OUTPATIENT)
Dept: LAB | Facility: HOSPITAL | Age: 70
End: 2020-09-16
Payer: COMMERCIAL

## 2020-09-16 DIAGNOSIS — R73.01 IFG (IMPAIRED FASTING GLUCOSE): ICD-10-CM

## 2020-09-16 DIAGNOSIS — Z13.220 SCREENING CHOLESTEROL LEVEL: ICD-10-CM

## 2020-09-16 LAB
CHOLEST SERPL-MCNC: 166 MG/DL (ref 50–200)
HDLC SERPL-MCNC: 49 MG/DL
LDLC SERPL CALC-MCNC: 105 MG/DL (ref 0–100)
NONHDLC SERPL-MCNC: 117 MG/DL
TRIGL SERPL-MCNC: 62 MG/DL

## 2020-09-16 PROCEDURE — 83036 HEMOGLOBIN GLYCOSYLATED A1C: CPT

## 2020-09-16 PROCEDURE — 36415 COLL VENOUS BLD VENIPUNCTURE: CPT

## 2020-09-16 PROCEDURE — 80061 LIPID PANEL: CPT

## 2020-09-16 PROCEDURE — 80048 BASIC METABOLIC PNL TOTAL CA: CPT

## 2020-09-17 LAB
ANION GAP SERPL CALCULATED.3IONS-SCNC: 11 MMOL/L (ref 4–13)
BUN SERPL-MCNC: 20 MG/DL (ref 5–25)
CALCIUM SERPL-MCNC: 9 MG/DL (ref 8.3–10.1)
CHLORIDE SERPL-SCNC: 105 MMOL/L (ref 100–108)
CO2 SERPL-SCNC: 24 MMOL/L (ref 21–32)
CREAT SERPL-MCNC: 0.97 MG/DL (ref 0.6–1.3)
EST. AVERAGE GLUCOSE BLD GHB EST-MCNC: 134 MG/DL
GFR SERPL CREATININE-BSD FRML MDRD: 80 ML/MIN/1.73SQ M
GLUCOSE P FAST SERPL-MCNC: 91 MG/DL (ref 65–99)
HBA1C MFR BLD: 6.3 %
POTASSIUM SERPL-SCNC: 4.8 MMOL/L (ref 3.5–5.3)
SODIUM SERPL-SCNC: 140 MMOL/L (ref 136–145)

## 2020-09-21 ENCOUNTER — OFFICE VISIT (OUTPATIENT)
Dept: FAMILY MEDICINE CLINIC | Facility: CLINIC | Age: 70
End: 2020-09-21
Payer: COMMERCIAL

## 2020-09-21 VITALS
RESPIRATION RATE: 17 BRPM | SYSTOLIC BLOOD PRESSURE: 130 MMHG | BODY MASS INDEX: 26.74 KG/M2 | WEIGHT: 191 LBS | DIASTOLIC BLOOD PRESSURE: 68 MMHG | OXYGEN SATURATION: 99 % | HEIGHT: 71 IN | HEART RATE: 64 BPM | TEMPERATURE: 97.9 F

## 2020-09-21 DIAGNOSIS — R73.01 IFG (IMPAIRED FASTING GLUCOSE): ICD-10-CM

## 2020-09-21 DIAGNOSIS — Z13.6 SCREENING FOR AAA (ABDOMINAL AORTIC ANEURYSM): ICD-10-CM

## 2020-09-21 DIAGNOSIS — Z00.00 ANNUAL PHYSICAL EXAM: ICD-10-CM

## 2020-09-21 DIAGNOSIS — Z23 ENCOUNTER FOR VACCINATION: Primary | ICD-10-CM

## 2020-09-21 DIAGNOSIS — E78.5 HYPERLIPIDEMIA, UNSPECIFIED HYPERLIPIDEMIA TYPE: ICD-10-CM

## 2020-09-21 PROCEDURE — 1036F TOBACCO NON-USER: CPT | Performed by: FAMILY MEDICINE

## 2020-09-21 PROCEDURE — 3725F SCREEN DEPRESSION PERFORMED: CPT | Performed by: FAMILY MEDICINE

## 2020-09-21 PROCEDURE — 90471 IMMUNIZATION ADMIN: CPT

## 2020-09-21 PROCEDURE — 99397 PER PM REEVAL EST PAT 65+ YR: CPT | Performed by: FAMILY MEDICINE

## 2020-09-21 PROCEDURE — 1160F RVW MEDS BY RX/DR IN RCRD: CPT | Performed by: FAMILY MEDICINE

## 2020-09-21 PROCEDURE — 4040F PNEUMOC VAC/ADMIN/RCVD: CPT | Performed by: FAMILY MEDICINE

## 2020-09-21 PROCEDURE — 90732 PPSV23 VACC 2 YRS+ SUBQ/IM: CPT

## 2020-09-21 RX ORDER — ATORVASTATIN CALCIUM 20 MG/1
20 TABLET, FILM COATED ORAL DAILY
Qty: 90 TABLET | Refills: 3 | Status: SHIPPED | OUTPATIENT
Start: 2020-09-21 | End: 2021-03-18 | Stop reason: HOSPADM

## 2020-09-21 NOTE — ASSESSMENT & PLAN NOTE
ASCVD risk at 14 7  Mainly based off of age  Discussed risks and benefits  Pt is ok with taking med for at least 10 years  Start lipitor 20  If muscle aches or abdominal pain occur patient will call office

## 2020-09-21 NOTE — PROGRESS NOTES
[facility]    NAME: Keren Persaud  AGE: 78 y o  SEX: male  : 1950     DATE: 2020     Assessment and Plan:     Problem List Items Addressed This Visit        Endocrine    IFG (impaired fasting glucose)    Relevant Orders    Hemoglobin A1C       Other    Hyperlipidemia     ASCVD risk at 14 7  Mainly based off of age  Discussed risks and benefits  Pt is ok with taking med for at least 10 years  Start lipitor 20  If muscle aches or abdominal pain occur patient will call office  Relevant Medications    atorvastatin (LIPITOR) 20 mg tablet    Other Relevant Orders    Lipid panel    Comprehensive metabolic panel      Other Visit Diagnoses     Encounter for vaccination    -  Primary    Relevant Orders    PNEUMOCOCCAL POLYSACCHARIDE VACCINE 23-VALENT =>3YO SQ IM (Completed)    Annual physical exam        Screening for AAA (abdominal aortic aneurysm)        Relevant Orders    US abdominal aorta screening aaa          Immunizations and preventive care screenings were discussed with patient today  Appropriate education was printed on patient's after visit summary  Given pneumococcal 23 today  He will get flu shot done in October  Counseling:  Alcohol/drug use: discussed moderation in alcohol intake, the recommendations for healthy alcohol use, and avoidance of illicit drug use  Dental Health: discussed importance of regular tooth brushing, flossing, and dental visits  Sexual health: discussed sexually transmitted diseases, partner selection, use of condoms, avoidance of unintended pregnancy, and contraceptive alternatives  · Exercise: the importance of regular exercise/physical activity was discussed  Recommend exercise 3-5 times per week for at least 30 minutes  Return in 1 year (on 2021)       Chief Complaint:     Chief Complaint   Patient presents with    Annual Exam      History of Present Illness:     Adult Annual Physical   Patient here for a comprehensive physical exam  He has impaired fasting glucose  Labs reviewed  Chem profile sodium 140, potassium 4 8, BUN 20, creatinine 0 7, GFR 80  Lipid profile total cholesterol 166, triglycerides 62, HDL 49,   Hemoglobin A1c 6 3 which is stable  The patient reports no problems  Diet and Physical Activity  · Diet/Nutrition: well balanced diet  · Exercise: vigorous cardiovascular exercise  Depression Screening  PHQ-9 Depression Screening    PHQ-9:    Frequency of the following problems over the past two weeks:       Little interest or pleasure in doing things:  0 - not at all  Feeling down, depressed, or hopeless:  0 - not at all  PHQ-2 Score:  0       General Health  · Sleep: sleeps well  · Vision: wears glasses  · Dental: regular dental visits   Health  · History of STDs?: no   · No urinary complaints  Review of Systems:     Review of Systems   Constitutional: Negative for activity change, appetite change, fever and unexpected weight change  HENT: Negative for ear pain, postnasal drip and rhinorrhea  Eyes: Negative for photophobia and pain  Respiratory: Negative for cough, shortness of breath and wheezing  Cardiovascular: Negative for chest pain, palpitations and leg swelling  Gastrointestinal: Negative for abdominal pain, blood in stool, nausea and vomiting  Endocrine: Negative for polydipsia and polyuria  Genitourinary: Negative for difficulty urinating, hematuria and urgency  Musculoskeletal: Negative for myalgias  Skin: Negative for rash  Neurological: Negative for dizziness  Psychiatric/Behavioral: Negative for confusion and sleep disturbance        Past Medical History:     Past Medical History:   Diagnosis Date    Femur fracture, right ([facility] [address] ) 2015      Past Surgical History:     Past Surgical History:   Procedure Laterality Date    TOTAL HIP ARTHROPLASTY        Social History:        Social History     Socioeconomic History    Marital status: /Civil Union     Spouse name: None    Number of children: 1    Years of education: None    Highest education level: None   Occupational History    Occupation: Retired    Social Needs    Financial resource strain: Not hard at all   Liza-Raymon insecurity     Worry: Never true     Inability: Never true   [employer] needs     Medical: No     Non-medical: No   Tobacco Use    Smoking status: Never Smoker    Smokeless tobacco: Never Used   Substance and Sexual Activity    Alcohol use: Yes     Comment: occasionally    Drug use: No    Sexual activity: Yes   Lifestyle    Physical activity     Days per week: 7 days     Minutes per session: 80 min    Stress: Not at all   Relationships    Social connections     Talks on phone: More than three times a week     Gets together: More than three times a week     Attends Mandaen service: Never     Active member of club or organization: Yes     Attends meetings of clubs or organizations: Never     Relationship status:     Intimate partner violence     Fear of current or ex partner: No     Emotionally abused: No     Physically abused: No     Forced sexual activity: No   Other Topics Concern    None   Social History Narrative        Non smoker    No caffeine use    Weekly alcohol cosumption    No recreational drug use    Always wears seatbelts    Retired    Lives with wife    No living will    No Mandaen preference      Family History:     Family History   Problem Relation Age of Onset    Heart disease Mother     Thyroid disease Mother     Hypertension Mother     Pancreatic cancer Father     Lung cancer Maternal Aunt       Current Medications:     Current Outpatient Medications   Medication Sig Dispense Refill    cholecalciferol (VITAMIN D3) 1,000 units tablet Take 1,000 Units by mouth daily      atorvastatin (LIPITOR) 20 mg tablet Take 1 tablet (20 mg total) by mouth daily 90 tablet 3 No current facility-administered medications for this visit  Allergies:     No Known Allergies   Physical Exam:     /68 (BP Location: Left arm, Patient Position: Sitting, Cuff Size: Standard)   Pulse 64   Temp 97 9 °F (36 6 °C) (Tympanic)   Resp 17   Ht 5' 11" (1 803 m)   Wt 86 6 kg (191 lb)   SpO2 99%   BMI 26 64 kg/m²     Physical Exam  Constitutional:       General: He is not in acute distress  Appearance: He is well-developed  HENT:      Head: Normocephalic and atraumatic  Eyes:      General: No scleral icterus  Conjunctiva/sclera: Conjunctivae normal       Pupils: Pupils are equal, round, and reactive to light  Neck:      Musculoskeletal: Normal range of motion  Cardiovascular:      Rate and Rhythm: Normal rate and regular rhythm  Heart sounds: Normal heart sounds  No murmur  No friction rub  No gallop  Pulmonary:      Effort: Pulmonary effort is normal  No respiratory distress  Breath sounds: Normal breath sounds  No wheezing or rales  Abdominal:      General: Bowel sounds are normal  There is no distension  Palpations: Abdomen is soft  There is no mass  Tenderness: There is no abdominal tenderness  There is no guarding  Musculoskeletal:         General: No tenderness  Skin:     General: Skin is warm and dry  Findings: No rash  Neurological:      Mental Status: He is alert and oriented to person, place, and time  Cranial Nerves: No cranial nerve deficit  Motor: No abnormal muscle tone            Chitra Forrest MD   [address] [place] [address]

## 2021-03-10 DIAGNOSIS — Z23 ENCOUNTER FOR IMMUNIZATION: ICD-10-CM

## 2021-03-15 ENCOUNTER — APPOINTMENT (INPATIENT)
Dept: NON INVASIVE DIAGNOSTICS | Facility: HOSPITAL | Age: 71
DRG: 247 | End: 2021-03-15
Payer: COMMERCIAL

## 2021-03-15 ENCOUNTER — APPOINTMENT (EMERGENCY)
Dept: RADIOLOGY | Facility: HOSPITAL | Age: 71
DRG: 247 | End: 2021-03-15
Payer: COMMERCIAL

## 2021-03-15 ENCOUNTER — OFFICE VISIT (OUTPATIENT)
Dept: URGENT CARE | Age: 71
End: 2021-03-15
Payer: COMMERCIAL

## 2021-03-15 ENCOUNTER — HOSPITAL ENCOUNTER (INPATIENT)
Facility: HOSPITAL | Age: 71
LOS: 3 days | Discharge: HOME | DRG: 247 | End: 2021-03-18
Attending: EMERGENCY MEDICINE | Admitting: INTERNAL MEDICINE
Payer: COMMERCIAL

## 2021-03-15 VITALS
BODY MASS INDEX: 26.74 KG/M2 | HEART RATE: 70 BPM | DIASTOLIC BLOOD PRESSURE: 68 MMHG | WEIGHT: 191 LBS | TEMPERATURE: 98 F | SYSTOLIC BLOOD PRESSURE: 142 MMHG | OXYGEN SATURATION: 97 % | HEIGHT: 71 IN | RESPIRATION RATE: 26 BRPM

## 2021-03-15 DIAGNOSIS — R07.9 CHEST PAIN: Primary | ICD-10-CM

## 2021-03-15 DIAGNOSIS — I21.4 NSTEMI (NON-ST ELEVATED MYOCARDIAL INFARCTION) (HCC): ICD-10-CM

## 2021-03-15 DIAGNOSIS — R07.9 ACUTE CHEST PAIN: Primary | ICD-10-CM

## 2021-03-15 PROBLEM — I24.9 ACUTE CORONARY SYNDROME (HCC): Status: ACTIVE | Noted: 2021-03-15

## 2021-03-15 LAB
ALBUMIN SERPL BCP-MCNC: 3.6 G/DL (ref 3.5–5)
ALP SERPL-CCNC: 107 U/L (ref 46–116)
ALT SERPL W P-5'-P-CCNC: 29 U/L (ref 12–78)
ANION GAP SERPL CALCULATED.3IONS-SCNC: 10 MMOL/L (ref 4–13)
APTT PPP: 29 SECONDS (ref 23–37)
AST SERPL W P-5'-P-CCNC: 26 U/L (ref 5–45)
ATRIAL RATE: 52 BPM
ATRIAL RATE: 53 BPM
ATRIAL RATE: 73 BPM
BASOPHILS # BLD AUTO: 0.05 THOUSANDS/ΜL (ref 0–0.1)
BASOPHILS NFR BLD AUTO: 0 % (ref 0–1)
BILIRUB SERPL-MCNC: 0.31 MG/DL (ref 0.2–1)
BUN SERPL-MCNC: 26 MG/DL (ref 5–25)
CALCIUM SERPL-MCNC: 9 MG/DL (ref 8.3–10.1)
CHLORIDE SERPL-SCNC: 106 MMOL/L (ref 100–108)
CO2 SERPL-SCNC: 26 MMOL/L (ref 21–32)
CREAT SERPL-MCNC: 0.91 MG/DL (ref 0.6–1.3)
EOSINOPHIL # BLD AUTO: 0.07 THOUSAND/ΜL (ref 0–0.61)
EOSINOPHIL NFR BLD AUTO: 1 % (ref 0–6)
ERYTHROCYTE [DISTWIDTH] IN BLOOD BY AUTOMATED COUNT: 12.8 % (ref 11.6–15.1)
GFR SERPL CREATININE-BSD FRML MDRD: 86 ML/MIN/1.73SQ M
GLUCOSE SERPL-MCNC: 158 MG/DL (ref 65–140)
HCT VFR BLD AUTO: 43.1 % (ref 36.5–49.3)
HGB BLD-MCNC: 14.2 G/DL (ref 12–17)
IMM GRANULOCYTES # BLD AUTO: 0.06 THOUSAND/UL (ref 0–0.2)
IMM GRANULOCYTES NFR BLD AUTO: 1 % (ref 0–2)
INR PPP: 1.09 (ref 0.84–1.19)
KCT BLD-ACNC: 299 SEC (ref 89–137)
LYMPHOCYTES # BLD AUTO: 1.16 THOUSANDS/ΜL (ref 0.6–4.47)
LYMPHOCYTES NFR BLD AUTO: 10 % (ref 14–44)
MAGNESIUM SERPL-MCNC: 2.1 MG/DL (ref 1.6–2.6)
MCH RBC QN AUTO: 29.8 PG (ref 26.8–34.3)
MCHC RBC AUTO-ENTMCNC: 32.9 G/DL (ref 31.4–37.4)
MCV RBC AUTO: 90 FL (ref 82–98)
MONOCYTES # BLD AUTO: 0.66 THOUSAND/ΜL (ref 0.17–1.22)
MONOCYTES NFR BLD AUTO: 6 % (ref 4–12)
NEUTROPHILS # BLD AUTO: 9.29 THOUSANDS/ΜL (ref 1.85–7.62)
NEUTS SEG NFR BLD AUTO: 82 % (ref 43–75)
NRBC BLD AUTO-RTO: 0 /100 WBCS
NT-PROBNP SERPL-MCNC: 69 PG/ML
P AXIS: 28 DEGREES
P AXIS: 57 DEGREES
P AXIS: 58 DEGREES
PLATELET # BLD AUTO: 217 THOUSANDS/UL (ref 149–390)
PMV BLD AUTO: 9.8 FL (ref 8.9–12.7)
POTASSIUM SERPL-SCNC: 3.9 MMOL/L (ref 3.5–5.3)
PR INTERVAL: 150 MS
PR INTERVAL: 154 MS
PR INTERVAL: 158 MS
PROT SERPL-MCNC: 6.8 G/DL (ref 6.4–8.2)
PROTHROMBIN TIME: 13.9 SECONDS (ref 11.6–14.5)
QRS AXIS: 53 DEGREES
QRS AXIS: 57 DEGREES
QRS AXIS: 66 DEGREES
QRSD INTERVAL: 86 MS
QRSD INTERVAL: 92 MS
QRSD INTERVAL: 94 MS
QT INTERVAL: 382 MS
QT INTERVAL: 440 MS
QT INTERVAL: 448 MS
QTC INTERVAL: 412 MS
QTC INTERVAL: 416 MS
QTC INTERVAL: 420 MS
RBC # BLD AUTO: 4.77 MILLION/UL (ref 3.88–5.62)
SODIUM SERPL-SCNC: 142 MMOL/L (ref 136–145)
SPECIMEN SOURCE: ABNORMAL
T WAVE AXIS: 54 DEGREES
T WAVE AXIS: 63 DEGREES
T WAVE AXIS: 65 DEGREES
TROPONIN I SERPL-MCNC: 0.48 NG/ML
TROPONIN I SERPL-MCNC: >40 NG/ML
TROPONIN I SERPL-MCNC: >40 NG/ML
VENTRICULAR RATE: 52 BPM
VENTRICULAR RATE: 53 BPM
VENTRICULAR RATE: 73 BPM
WBC # BLD AUTO: 11.29 THOUSAND/UL (ref 4.31–10.16)

## 2021-03-15 PROCEDURE — 83880 ASSAY OF NATRIURETIC PEPTIDE: CPT | Performed by: EMERGENCY MEDICINE

## 2021-03-15 PROCEDURE — 93010 ELECTROCARDIOGRAM REPORT: CPT

## 2021-03-15 PROCEDURE — C1769 GUIDE WIRE: HCPCS

## 2021-03-15 PROCEDURE — C1725 CATH, TRANSLUMIN NON-LASER: HCPCS

## 2021-03-15 PROCEDURE — C1757 CATH, THROMBECTOMY/EMBOLECT: HCPCS

## 2021-03-15 PROCEDURE — 93005 ELECTROCARDIOGRAM TRACING: CPT

## 2021-03-15 PROCEDURE — 92928 PRQ TCAT PLMT NTRAC ST 1 LES: CPT | Performed by: INTERNAL MEDICINE

## 2021-03-15 PROCEDURE — 93454 CORONARY ARTERY ANGIO S&I: CPT

## 2021-03-15 PROCEDURE — 93454 CORONARY ARTERY ANGIO S&I: CPT | Performed by: INTERNAL MEDICINE

## 2021-03-15 PROCEDURE — 85610 PROTHROMBIN TIME: CPT | Performed by: EMERGENCY MEDICINE

## 2021-03-15 PROCEDURE — 71045 X-RAY EXAM CHEST 1 VIEW: CPT

## 2021-03-15 PROCEDURE — 84484 ASSAY OF TROPONIN QUANT: CPT | Performed by: INTERNAL MEDICINE

## 2021-03-15 PROCEDURE — 85025 COMPLETE CBC W/AUTO DIFF WBC: CPT | Performed by: EMERGENCY MEDICINE

## 2021-03-15 PROCEDURE — 96376 TX/PRO/DX INJ SAME DRUG ADON: CPT

## 2021-03-15 PROCEDURE — B2111ZZ FLUOROSCOPY OF MULTIPLE CORONARY ARTERIES USING LOW OSMOLAR CONTRAST: ICD-10-PCS | Performed by: INTERNAL MEDICINE

## 2021-03-15 PROCEDURE — 83735 ASSAY OF MAGNESIUM: CPT | Performed by: EMERGENCY MEDICINE

## 2021-03-15 PROCEDURE — C9600 PERC DRUG-EL COR STENT SING: HCPCS

## 2021-03-15 PROCEDURE — 99203 OFFICE O/P NEW LOW 30 MIN: CPT | Performed by: PHYSICIAN ASSISTANT

## 2021-03-15 PROCEDURE — 96365 THER/PROPH/DIAG IV INF INIT: CPT

## 2021-03-15 PROCEDURE — 84484 ASSAY OF TROPONIN QUANT: CPT | Performed by: EMERGENCY MEDICINE

## 2021-03-15 PROCEDURE — 85347 COAGULATION TIME ACTIVATED: CPT

## 2021-03-15 PROCEDURE — C1874 STENT, COATED/COV W/DEL SYS: HCPCS

## 2021-03-15 PROCEDURE — C1887 CATHETER, GUIDING: HCPCS

## 2021-03-15 PROCEDURE — 36415 COLL VENOUS BLD VENIPUNCTURE: CPT | Performed by: EMERGENCY MEDICINE

## 2021-03-15 PROCEDURE — 99223 1ST HOSP IP/OBS HIGH 75: CPT | Performed by: INTERNAL MEDICINE

## 2021-03-15 PROCEDURE — 93010 ELECTROCARDIOGRAM REPORT: CPT | Performed by: INTERNAL MEDICINE

## 2021-03-15 PROCEDURE — 027034Z DILATION OF CORONARY ARTERY, ONE ARTERY WITH DRUG-ELUTING INTRALUMINAL DEVICE, PERCUTANEOUS APPROACH: ICD-10-PCS | Performed by: INTERNAL MEDICINE

## 2021-03-15 PROCEDURE — 99255 IP/OBS CONSLTJ NEW/EST HI 80: CPT

## 2021-03-15 PROCEDURE — 99291 CRITICAL CARE FIRST HOUR: CPT | Performed by: EMERGENCY MEDICINE

## 2021-03-15 PROCEDURE — 99291 CRITICAL CARE FIRST HOUR: CPT

## 2021-03-15 PROCEDURE — 80053 COMPREHEN METABOLIC PANEL: CPT | Performed by: EMERGENCY MEDICINE

## 2021-03-15 PROCEDURE — 85730 THROMBOPLASTIN TIME PARTIAL: CPT | Performed by: EMERGENCY MEDICINE

## 2021-03-15 PROCEDURE — C1894 INTRO/SHEATH, NON-LASER: HCPCS

## 2021-03-15 RX ORDER — HEPARIN SODIUM 10000 [USP'U]/100ML
3-20 INJECTION, SOLUTION INTRAVENOUS
Status: DISCONTINUED | OUTPATIENT
Start: 2021-03-15 | End: 2021-03-15

## 2021-03-15 RX ORDER — ATORVASTATIN CALCIUM 80 MG/1
80 TABLET, FILM COATED ORAL
Status: DISCONTINUED | OUTPATIENT
Start: 2021-03-15 | End: 2021-03-18 | Stop reason: HOSPADM

## 2021-03-15 RX ORDER — HEPARIN SODIUM 1000 [USP'U]/ML
INJECTION, SOLUTION INTRAVENOUS; SUBCUTANEOUS CODE/TRAUMA/SEDATION MEDICATION
Status: COMPLETED | OUTPATIENT
Start: 2021-03-15 | End: 2021-03-15

## 2021-03-15 RX ORDER — ONDANSETRON 2 MG/ML
4 INJECTION INTRAMUSCULAR; INTRAVENOUS EVERY 6 HOURS PRN
Status: DISCONTINUED | OUTPATIENT
Start: 2021-03-15 | End: 2021-03-18 | Stop reason: HOSPADM

## 2021-03-15 RX ORDER — ASPIRIN 81 MG/1
81 TABLET ORAL DAILY
Status: DISCONTINUED | OUTPATIENT
Start: 2021-03-16 | End: 2021-03-18 | Stop reason: HOSPADM

## 2021-03-15 RX ORDER — HEPARIN SODIUM 1000 [USP'U]/ML
4000 INJECTION, SOLUTION INTRAVENOUS; SUBCUTANEOUS ONCE
Status: COMPLETED | OUTPATIENT
Start: 2021-03-15 | End: 2021-03-15

## 2021-03-15 RX ORDER — HEPARIN SODIUM 1000 [USP'U]/ML
2000 INJECTION, SOLUTION INTRAVENOUS; SUBCUTANEOUS
Status: DISCONTINUED | OUTPATIENT
Start: 2021-03-15 | End: 2021-03-15

## 2021-03-15 RX ORDER — ACETAMINOPHEN 325 MG/1
650 TABLET ORAL EVERY 4 HOURS PRN
Status: DISCONTINUED | OUTPATIENT
Start: 2021-03-15 | End: 2021-03-18 | Stop reason: HOSPADM

## 2021-03-15 RX ORDER — ASPIRIN 81 MG/1
324 TABLET, CHEWABLE ORAL DAILY
Status: DISCONTINUED | OUTPATIENT
Start: 2021-03-15 | End: 2021-03-15

## 2021-03-15 RX ORDER — VERAPAMIL HYDROCHLORIDE 2.5 MG/ML
INJECTION, SOLUTION INTRAVENOUS CODE/TRAUMA/SEDATION MEDICATION
Status: COMPLETED | OUTPATIENT
Start: 2021-03-15 | End: 2021-03-15

## 2021-03-15 RX ORDER — DOCUSATE SODIUM 100 MG/1
100 CAPSULE, LIQUID FILLED ORAL 2 TIMES DAILY
Status: DISCONTINUED | OUTPATIENT
Start: 2021-03-15 | End: 2021-03-18 | Stop reason: HOSPADM

## 2021-03-15 RX ORDER — HEPARIN SODIUM 1000 [USP'U]/ML
4000 INJECTION, SOLUTION INTRAVENOUS; SUBCUTANEOUS
Status: DISCONTINUED | OUTPATIENT
Start: 2021-03-15 | End: 2021-03-15

## 2021-03-15 RX ORDER — ATORVASTATIN CALCIUM 20 MG/1
20 TABLET, FILM COATED ORAL
Status: DISCONTINUED | OUTPATIENT
Start: 2021-03-15 | End: 2021-03-15

## 2021-03-15 RX ORDER — ONDANSETRON 2 MG/ML
INJECTION INTRAMUSCULAR; INTRAVENOUS CODE/TRAUMA/SEDATION MEDICATION
Status: COMPLETED | OUTPATIENT
Start: 2021-03-15 | End: 2021-03-15

## 2021-03-15 RX ORDER — FENTANYL CITRATE 50 UG/ML
INJECTION, SOLUTION INTRAMUSCULAR; INTRAVENOUS CODE/TRAUMA/SEDATION MEDICATION
Status: COMPLETED | OUTPATIENT
Start: 2021-03-15 | End: 2021-03-15

## 2021-03-15 RX ORDER — NITROGLYCERIN 20 MG/100ML
5-200 INJECTION INTRAVENOUS
Status: DISCONTINUED | OUTPATIENT
Start: 2021-03-15 | End: 2021-03-15

## 2021-03-15 RX ORDER — MIDAZOLAM HYDROCHLORIDE 2 MG/2ML
INJECTION, SOLUTION INTRAMUSCULAR; INTRAVENOUS CODE/TRAUMA/SEDATION MEDICATION
Status: COMPLETED | OUTPATIENT
Start: 2021-03-15 | End: 2021-03-15

## 2021-03-15 RX ORDER — SODIUM NITROPRUSSIDE 25 MG/ML
INJECTION INTRAVENOUS CODE/TRAUMA/SEDATION MEDICATION
Status: COMPLETED | OUTPATIENT
Start: 2021-03-15 | End: 2021-03-15

## 2021-03-15 RX ORDER — LIDOCAINE HYDROCHLORIDE 10 MG/ML
INJECTION, SOLUTION EPIDURAL; INFILTRATION; INTRACAUDAL; PERINEURAL CODE/TRAUMA/SEDATION MEDICATION
Status: COMPLETED | OUTPATIENT
Start: 2021-03-15 | End: 2021-03-15

## 2021-03-15 RX ORDER — NITROGLYCERIN 20 MG/100ML
INJECTION INTRAVENOUS CODE/TRAUMA/SEDATION MEDICATION
Status: COMPLETED | OUTPATIENT
Start: 2021-03-15 | End: 2021-03-15

## 2021-03-15 RX ADMIN — HEPARIN SODIUM 4000 UNITS: 1000 INJECTION INTRAVENOUS; SUBCUTANEOUS at 10:48

## 2021-03-15 RX ADMIN — NITROGLYCERIN 200 MCG: 20 INJECTION INTRAVENOUS at 12:31

## 2021-03-15 RX ADMIN — IOHEXOL 130 ML: 350 INJECTION, SOLUTION INTRAVENOUS at 12:59

## 2021-03-15 RX ADMIN — NITROGLYCERIN 10 MCG/MIN: 20 INJECTION INTRAVENOUS at 10:26

## 2021-03-15 RX ADMIN — FENTANYL CITRATE 50 MCG: 50 INJECTION, SOLUTION INTRAMUSCULAR; INTRAVENOUS at 12:31

## 2021-03-15 RX ADMIN — HEPARIN SODIUM 6000 UNITS: 1000 INJECTION INTRAVENOUS; SUBCUTANEOUS at 12:34

## 2021-03-15 RX ADMIN — FENTANYL CITRATE 50 MCG: 50 INJECTION, SOLUTION INTRAMUSCULAR; INTRAVENOUS at 12:21

## 2021-03-15 RX ADMIN — VERAPAMIL HYDROCHLORIDE 2.5 MG: 2.5 INJECTION, SOLUTION INTRAVENOUS at 12:31

## 2021-03-15 RX ADMIN — HEPARIN SODIUM 11.1 UNITS/KG/HR: 10000 INJECTION, SOLUTION INTRAVENOUS at 10:55

## 2021-03-15 RX ADMIN — MIDAZOLAM 2 MG: 1 INJECTION INTRAMUSCULAR; INTRAVENOUS at 12:31

## 2021-03-15 RX ADMIN — DOCUSATE SODIUM 100 MG: 100 CAPSULE, LIQUID FILLED ORAL at 18:16

## 2021-03-15 RX ADMIN — LIDOCAINE HYDROCHLORIDE 1 ML: 10 INJECTION, SOLUTION EPIDURAL; INFILTRATION; INTRACAUDAL; PERINEURAL at 12:29

## 2021-03-15 RX ADMIN — TICAGRELOR 90 MG: 90 TABLET ORAL at 18:16

## 2021-03-15 RX ADMIN — ASPIRIN 324 MG: 81 TABLET, CHEWABLE ORAL at 10:05

## 2021-03-15 RX ADMIN — MIDAZOLAM 2 MG: 1 INJECTION INTRAMUSCULAR; INTRAVENOUS at 12:21

## 2021-03-15 RX ADMIN — TICAGRELOR 180 MG: 90 TABLET ORAL at 10:50

## 2021-03-15 RX ADMIN — ONDANSETRON 4 MG: 2 INJECTION INTRAMUSCULAR; INTRAVENOUS at 12:56

## 2021-03-15 RX ADMIN — ATORVASTATIN CALCIUM 80 MG: 80 TABLET, FILM COATED ORAL at 18:16

## 2021-03-15 RX ADMIN — SODIUM NITROPRUSSIDE 150 MCG: 50 INJECTION, SOLUTION, CONCENTRATE INTRAVENOUS at 12:54

## 2021-03-15 RX ADMIN — SODIUM NITROPRUSSIDE 150 MCG: 50 INJECTION, SOLUTION, CONCENTRATE INTRAVENOUS at 12:55

## 2021-03-15 NOTE — ASSESSMENT & PLAN NOTE
-patient has a history of chronic impaired fasting glucose  -continue monitor blood sugar  -check A1c

## 2021-03-15 NOTE — ASSESSMENT & PLAN NOTE
-patient presented to the ER new onset chest pressure with exertion, and decreased exercise tolerance  -initial EKG revealed slight inferior ST-elevation, with lateral ST depressions  -initial troponin 0 48  -and route patient was given 2 nitroglycerin sublingually, 324 mg of aspirin  -in the ER he was started on heparin infusion ACS protocol, nitroglycerin infusion, and loaded with Brilinta  -patient continues with pain 1/10 intensity  -patient was evaluated by Cardiology at the bedside and is being taken for urgent cardiac catheterization  -concerns for acute coronary syndrome, with elevated troponin secondary to type 1 MI  -unable start beta-blocker due to resting bradycardia  -continue Lipitor 20 mg daily

## 2021-03-15 NOTE — ED NOTES
Per Cardiology, patient is going to go to the cath lab before going to room [address], RN  03/15/21 7061

## 2021-03-15 NOTE — PROGRESS NOTES
[facility]  Progress Note - Claude Magallon 1950, 78 y o  male MRN: [id number]  Unit/Bed#: ED 20 Encounter: [id number]  Primary Care Provider: Dia Chang MD   Date and time admitted to hospital: 3/15/2021 10:08 AM      * Acute coronary syndrome [facility])  Assessment & Plan  -patient presented to the ER new onset chest pressure with exertion, and decreased exercise tolerance  -initial EKG revealed slight inferior ST-elevation, with lateral ST depressions  -initial troponin 0 48  -and route patient was given 2 nitroglycerin sublingually, 324 mg of aspirin  -in the ER he was started on heparin infusion ACS protocol, nitroglycerin infusion, and loaded with Brilinta  -patient continues with pain 1/10 intensity  -patient was evaluated by Cardiology at the bedside and is being taken for urgent cardiac catheterization  -concerns for acute coronary syndrome, with elevated troponin secondary to type 1 MI  -unable start beta-blocker due to resting bradycardia  -continue Lipitor 20 mg daily      Hyperlipidemia  Assessment & Plan  -continue statin    IFG (impaired fasting glucose)  Assessment & Plan  -patient has a history of chronic impaired fasting glucose  -continue monitor blood sugar  -check A1c        ==============================      History of Present Illness     HPI:  Claude Magallon is a 78 y o  male with past medical history significant for hyperlipidemia and impaired fasting glucose  Patient notes a gradual decrease in his exercise tolerance over the past year  Patient relates the day he was riding his bike, and going extremely slowly  This was quite unusual for him  Despite this low speed he felt very dyspneic  He felt exhausted and had to stop multiple times  Patient then developed the acute onset of anterior chest pressure initially 4/10 intensity  He denies any shortness of breath, or diaphoresis  He had initially had nausea and vomited once    He notes belching but denies any heartburn or indigestion    The pain was located across his anterior chest without radiation  Patient was seen in urgent care noted to have abnormal EKG  He was given 324 mg of aspirin, and nitroglycerin sublingually x2 and sent to the ER  Patient notes that the sublingual nitro glycerin did not improve his pain  In the ER patient was noted to have abnormal EKG with minimal ST elevations inferiorly with reciprocal depressions laterally  He persisted with chest pain and was started on nitroglycerin infusion  Patient's his chest pressure is currently a 1/10 intensity, however has not completely resolved  He denies any current shortness of breath, nausea or diaphoresis  He notes the pain is still located across his sternum without radiation          Historical Information   Past Medical History:   Diagnosis Date    Femur fracture, right ([facility] [address] ) 2015    Hyperlipidemia      Patient Active Problem List   Diagnosis    IFG (impaired fasting glucose)    Hyperlipidemia    Acute coronary syndrome (HCC)     Past Surgical History:   Procedure Laterality Date    TOTAL HIP ARTHROPLASTY         Social History   Social History     Substance and Sexual Activity   Alcohol Use Yes    Frequency: Monthly or less    Comment: occasionally     Social History     Substance and Sexual Activity   Drug Use No     Social History     Tobacco Use   Smoking Status Never Smoker   Smokeless Tobacco Never Used       Family History:   Family History   Problem Relation Age of Onset    Heart disease Mother     Thyroid disease Mother     Hypertension Mother     Pancreatic cancer Father     Lung cancer Maternal Aunt        Meds/Allergies       Current Facility-Administered Medications:     aspirin chewable tablet 324 mg, 324 mg, Oral, Daily, Christopher Granado PA-C, 324 mg at 03/15/21 1005    heparin (porcine) 25,000 units in 0 45% NaCl 250 mL infusion (premix), 3-20 Units/kg/hr (Order-Specific), Intravenous, Titrated, Zachary Frazier MD, Last Rate: 10 mL/hr at 03/15/21 1055, 11 1 Units/kg/hr at 03/15/21 1055    heparin (porcine) injection 2,000 Units, 2,000 Units, Intravenous, Q1H PRN, Zachary Frazier MD    heparin (porcine) injection 4,000 Units, 4,000 Units, Intravenous, Q1H PRN, Zachary Frazier MD    nitroGLYcerin (TRIDIL) 50 mg in 250 mL infusion (premix), 5-200 mcg/min, Intravenous, Titrated, Zachary Frazier MD, Last Rate: 6 mL/hr at 03/15/21 1151, 20 mcg/min at 03/15/21 1151    Current Outpatient Medications:     atorvastatin (LIPITOR) 20 mg tablet, Take 1 tablet (20 mg total) by mouth daily, Disp: 90 tablet, Rfl: 3    cholecalciferol (VITAMIN D3) 1,000 units tablet, Take 1,000 Units by mouth daily, Disp: , Rfl:     No Known Allergies    Review of Systems  A detailed 12 point review of systems was conducted and is negative apart from those mentioned in the HPI  Objective   Vitals: Blood pressure 142/63, pulse 64, temperature 97 8 °F (36 6 °C), resp  rate 17, weight 92 4 kg (203 lb 11 3 oz), SpO2 99 %  Physical Exam   General:  Very pleasant male  No acute distress  Not tachypneic  Able to speak in complete sentences without having to stop for dyspnea  HEENT: EOMI, sclera anicteric,   Neck: supple,   Heart: Regular rate and rhythm, S1S2 present  No murmur, rub or gallop  Lungs; Clear to auscultation bilaterally  No wheezing, crackles or rhonchi  Good air movement  No accessory muscle use or respiratory distress  Abdomen: soft, non-tender, non-distended, NABS  No guarding or rebound  No peritoneal sound or mass  Extremities: no clubbing, cyanosis, or edema  2+ pedal pulses bilaterally  Full range of motion  Neurologic:  Awake alert  Fluent and goal directed speech  Moving all 4 extremities  Skin: warm and dry  No petechiae, purpura or rash      Lab Results:   Results from last 7 days   Lab Units 03/15/21  1028   WBC Thousand/uL 11 29*   HEMOGLOBIN g/dL 14 2 HEMATOCRIT % 43 1   PLATELETS Thousands/uL 217     Results from last 7 days   Lab Units 03/15/21  1028   POTASSIUM mmol/L 3 9   CHLORIDE mmol/L 106   CO2 mmol/L 26   BUN mg/dL 26*   CREATININE mg/dL 0 91   CALCIUM mg/dL 9 0         Imaging:    Chest x-ray:  No acute disease    EKG:  NSR:  1 mm ST-elevation in lead 3 and AVF  1mm ST depression laterally  Code Status: Full Code    Disposition:  Due to patient's acute coronary syndrome and probable type 1 MI patient will be taken urgently to the cardiac catheterization lab  Is currently on heparin infusion and nitroglycerin infusion  It is anticipated that is length of stay will be greater than 2 midnights and be placed on inpatient status    Family:  Updated patient's wife at the bedside  Reviewed all test results and treatment plan    Discussed with patient's ER nurse  Discussed with cardiologist Dr Johnnie Chaney at the bedside      Portions of the record may have been created with voice recognition software

## 2021-03-15 NOTE — ED NOTES
Per cardiology- tritiate nitro drip for pain only; patient's pain at this time is 1/10      June Alaniz, RN  03/15/21 6009

## 2021-03-15 NOTE — ED NOTES
ED provider at bedside updating patient and family at this time       Carol Barajas RN  03/15/21 3836

## 2021-03-15 NOTE — CONSULTS
Consult - Cardiology   Jacob Orellana 78 y o  male MRN: [id number]  Unit/Bed#: ED 20 Encounter: [id number]        Reason For Consult:  Chest pain                 Assessment:  Unstable angina - probable Type I MI    Dyslipidemia:   FLP 9/16/2020: TChol 166, Trig 62,  HDL 49,    Impaired fasting glucose   Rx: none   A1C -9/16/2020, 6 3     Discussion / Plan:  · Cardiac catheterization   Heparin drip ongoing   Patient has received a loading dose of Brilinta and full though aspirin  · Continue nitroglycerin drip titrating for pain  · Check echo        History Of Present Illness:  Jacob Orellana is a 78year old with modest medical history highlighted by impaired fasting glucose and dyslipidemia for which she was initiated on statin in the fall of last year  This gentleman is normally quite active typically biking 25 miles per week  Today the patient was riding his bicycle when he had the acute sensation of precordial chest pain  He denied any associated cardiac ectopy or significant dyspnea though he may have had some mild feelings of diaphoresis  Because of his symptoms he change direction road back to his car noting that with this he had ongoing symptoms  Once he arrived at his car called his wife who then took [facility] to one of [facility]  There he had ongoing chest pain with reports of some possible fever and chills  Concern for ischemic EKG changes he is transferred to the [facility] by ambulance  Initial troponin is 0 48  Patient has ongoing discomfort at time of evaluation with 2nd ECG showing resolution of anterior T-wave inversion with some subtle suggestion of inferior ST elevation            Past Medical History:        Past Medical History:   Diagnosis Date    Femur fracture, right ([address] ) 2015      Past Surgical History:   Procedure Laterality Date    TOTAL HIP ARTHROPLASTY          Allergy:        No Known Allergies    Medications:       Prior to Admission medications Medication Sig Start Date End Date Taking?  Authorizing Provider   atorvastatin (LIPITOR) 20 mg tablet Take 1 tablet (20 mg total) by mouth daily 9/21/20   Reshma Montenegro MD   cholecalciferol (VITAMIN D3) 1,000 units tablet Take 1,000 Units by mouth daily    Historical Provider, MD       Family History:     Family History   Problem Relation Age of Onset    Heart disease Mother     Thyroid disease Mother     Hypertension Mother     Pancreatic cancer Father     Lung cancer Maternal Aunt         Social History:       Social History     Socioeconomic History    Marital status: /Civil Union     Spouse name: Not on file    Number of children: 1    Years of education: Not on file    Highest education level: Not on file   Occupational History    Occupation: Retired    Social Needs    Financial resource strain: Not hard at all   Liza-Raymon insecurity     Worry: Never true     Inability: Never true    Transportation needs     Medical: No     Non-medical: No   Tobacco Use    Smoking status: Never Smoker    Smokeless tobacco: Never Used   Substance and Sexual Activity    Alcohol use: Yes     Comment: occasionally    Drug use: No    Sexual activity: Yes   Lifestyle    Physical activity     Days per week: 7 days     Minutes per session: 80 min    Stress: Not at all   Relationships    Social connections     Talks on phone: More than three times a week     Gets together: More than three times a week     Attends [employer] service: Never     Active member of club or organization: Yes     Attends meetings of clubs or organizations: Never     Relationship status:     Intimate partner violence     Fear of current or ex partner: No     Emotionally abused: No     Physically abused: No     Forced sexual activity: No   Other Topics Concern    Not on file   Social History Narrative        Non smoker    No caffeine use    Weekly alcohol cosumption    No recreational drug use    Always wears seatbelts Retired    Lives with wife    No living will    No Yazidism preference       ROS:  Symptoms per HPI  The remainder of the review of systems is negative    Exam:  General:  Alert, normally conversant, in no distress though he appears slightly uncomfortable  Head: Normocephalic, atraumatic  Eyes:  EOMI  Pupils - equal, round, reactive to accomodation  No icterus  Normal Conjunctiva  Oropharynx: Moist without lesion  Neck:  No gross bruit, JVD, thyromegaly, or lymphadenopathy  Heart:  Regular with controlled rate  No rub nor pathologic murmur  Lungs:  Clear without rales/rhonchi/wheeze  Abdomen:  Soft and nontender with normal bowel sounds  No organomegaly or mass  Radial pulses:  2+ bilaterally  Lower Limbs:  No edema  Pulses[de-identified]  RLE - DP:  2+                 LLE - DP:  2+  Musculoskeletal: Independent movement of limbs observed, Formal ROM and strength eval not performed  Neurologic:    Oriented to: person, place, situation  Cranial Nerves: grossly intact - vision, smell, taste, and hearing were not tested  Motor function: grossly normal, symmetric   Sensation: Was not tested  Vitals:    03/15/21 1040 03/15/21 1045 03/15/21 1113 03/15/21 1115   BP: 132/66 152/65 143/65 136/65   BP Location:  Right arm     Pulse: 55 58 56 58   Resp: 20 20 20 20   SpO2: 100% 100% 98% 99%   Weight:               DATA:      Weights: Wt Readings from Last 20 Encounters:   03/15/21 92 4 kg (203 lb 11 3 oz)   03/15/21 86 6 kg (191 lb)   09/21/20 86 6 kg (191 lb)   05/19/20 90 1 kg (198 lb 9 6 oz)   05/12/20 83 9 kg (185 lb)   09/09/19 83 2 kg (183 lb 6 4 oz)   08/21/19 83 5 kg (184 lb)   01/25/19 87 6 kg (193 lb 3 2 oz)   01/04/19 83 9 kg (185 lb)   11/16/18 83 9 kg (185 lb)   09/06/18 79 5 kg (175 lb 3 2 oz)   08/02/16 84 4 kg (186 lb)   02/02/16 84 5 kg (186 lb 4 oz)   11/10/15 80 kg (176 lb 6 1 oz)   09/15/15 80 1 kg (176 lb 8 oz)   08/25/15 77 8 kg (171 lb 8 oz)   , Body mass index is 28 41 kg/m²           Lab Studies:    Results from last 7 days   Lab Units 03/15/21  1028   TROPONIN I ng/mL 0 48*            Results from last 7 days   Lab Units 03/15/21  1028   WBC Thousand/uL 11 29*   HEMOGLOBIN g/dL 14 2   HEMATOCRIT % 43 1   PLATELETS Thousands/uL 217   ,   Results from last 7 days   Lab Units 03/15/21  1028   POTASSIUM mmol/L 3 9   CHLORIDE mmol/L 106   CO2 mmol/L 26   BUN mg/dL 26*   CREATININE mg/dL 0 91   CALCIUM mg/dL 9 0   ALK PHOS U/L 107   ALT U/L 29   AST U/L 26

## 2021-03-15 NOTE — BRIEF OP NOTE (RAD/CATH)
Right radial cath    LMCA: no signif disease  LAD: 90% mid (suggest PCI am 3/17/2021)  LCX: 50-60% ostial, 100% mid thrombotic culprit (2 5 x 22mm [place] EMILIA, post dilated 3 5 NC proximally)  RCA: no significant CAD    Plan: DAPT, GDMT CAD, return 3/17 PCI LAD

## 2021-03-15 NOTE — PROGRESS NOTES
[facility]        NAME: Adamaris Neff is a 78 y o  male  : 1950    MRN: [id number]  DATE: March 15, 2021  TIME: 9:56 AM    Assessment and Plan   Acute chest pain [R07 9]  1  Acute chest pain  Transfer to other facility    Discussed with patient by his wife was with him in light of symptoms in appearance as well as current objective exam findings recommend emergency room evaluation  Ambulance offered an EMS services called patient transferred to [place] [address]  Patient Instructions       Follow up with PCP in 3-5 days  Proceed to  ER if symptoms worsen  Chief Complaint     Chief Complaint   Patient presents with    Chest Pain     pt came into care now with chest pain across the chest  pt was riding his bike earlier today  History of Present Illness       78 male presents to the office with chest pain of approximately 1 hour duration  Patient reports that he was riding his bike he normally does during the morning when symptoms started  Patient reports that was feeling abnormally tired this morning and had to stop a few times during his ride which he normally does not have to do  Patient describes his pain as a chest discomfort and rates it approximately an 8/10  Denies radiation to bilateral upper extremities, he denies back pain  Patient notes that he feels very cold and is having trouble getting warm and has had shaking as well  He denies any upper respiratory symptoms including runny nose, cough, sore throat, and headache  He reports some mild feelings of nausea and indigestion; denies any vomiting and diarrhea  Patient denies any prior history of these symptoms  He has no known history of cardiovascular disease, hypertension, hyperlipidemia  Patient states his family doctor started him on atorvastatin he believes just because of his age  No history of diabetes or cardiopulmonary disease either    Patient family history is positive for CABG in his mother  No other concerns or complaints today  Review of Systems   Review of Systems   Constitutional: Positive for chills and fatigue  Negative for diaphoresis and fever  HENT: Negative for congestion and sore throat  Respiratory: Positive for chest tightness  Negative for cough, shortness of breath and wheezing  Cardiovascular: Positive for chest pain  Negative for palpitations and leg swelling  Gastrointestinal: Positive for nausea  Negative for diarrhea and vomiting  Neurological: Positive for light-headedness  Negative for dizziness and headaches  Current Medications       Current Outpatient Medications:     atorvastatin (LIPITOR) 20 mg tablet, Take 1 tablet (20 mg total) by mouth daily, Disp: 90 tablet, Rfl: 3    cholecalciferol (VITAMIN D3) 1,000 units tablet, Take 1,000 Units by mouth daily, Disp: , Rfl:     Current Allergies     Allergies as of 03/15/2021    (No Known Allergies)            The following portions of the patient's history were reviewed and updated as appropriate: allergies, current medications, past family history, past medical history, past social history, past surgical history and problem list      Past Medical History:   Diagnosis Date    Femur fracture, right [address] [address] ) 2015       Past Surgical History:   Procedure Laterality Date    TOTAL HIP ARTHROPLASTY         Family History   Problem Relation Age of Onset    Heart disease Mother     Thyroid disease Mother     Hypertension Mother     Pancreatic cancer Father     Lung cancer Maternal Aunt          Medications have been verified  Objective   /68   Pulse 70   Temp 98 °F (36 7 °C)   Resp (!) 26   Ht 5' 11" (1 803 m)   Wt 86 6 kg (191 lb)   SpO2 97%   BMI 26 64 kg/m²   No LMP for male patient  Physical Exam     Physical Exam  Vitals signs and nursing note reviewed  Constitutional:       General: He is awake  He is in acute distress  Appearance: Normal appearance   He is well-developed and well-groomed  He is ill-appearing  He is not toxic-appearing or diaphoretic  HENT:      Head: Normocephalic and atraumatic  Right Ear: Hearing and external ear normal       Left Ear: Hearing and external ear normal       Nose: No nasal deformity  Mouth/Throat:      Lips: Pink  No lesions  Eyes:      General: Lids are normal  Gaze aligned appropriately  Extraocular Movements: Extraocular movements intact  Neck:      Musculoskeletal: Normal range of motion  Cardiovascular:      Rate and Rhythm: Normal rate  Rhythm regularly irregular  Chest Wall: No thrill  Pulses: No decreased pulses  Radial pulses are 2+ on the right side and 2+ on the left side  Pulmonary:      Effort: Tachypnea present  Comments:   Patient speaks 3-4 words, then stops to pause to catch his breath  Musculoskeletal:      Right lower le+ Pitting Edema present  Left lower le+ Pitting Edema present  Skin:     General: Skin is cool  Coloration: Skin is pale  Neurological:      Mental Status: He is alert and oriented to person, place, and time  Sensory: Sensation is intact  Motor: Motor function is intact  Comments:  No facial asymmetry noted  Patient is able to lift both arms and hold with equal strength  Psychiatric:         Attention and Perception: Attention and perception normal          Mood and Affect: Mood and affect normal          Speech: Speech is delayed  Behavior: Behavior normal  Behavior is cooperative  Thought Content: Thought content normal        ECG:   Normal sinus rhythm with significant artifact noted  Apparent mild ST depression in leads V2, V4, and V6  Occasional PVC's

## 2021-03-16 ENCOUNTER — APPOINTMENT (INPATIENT)
Dept: NON INVASIVE DIAGNOSTICS | Facility: HOSPITAL | Age: 71
DRG: 247 | End: 2021-03-16
Payer: COMMERCIAL

## 2021-03-16 PROBLEM — R01.1 MURMUR: Status: ACTIVE | Noted: 2021-03-16

## 2021-03-16 LAB
ANION GAP SERPL CALCULATED.3IONS-SCNC: 8 MMOL/L (ref 4–13)
ATRIAL RATE: 59 BPM
ATRIAL RATE: 65 BPM
BASOPHILS # BLD AUTO: 0.06 THOUSANDS/ΜL (ref 0–0.1)
BASOPHILS NFR BLD AUTO: 1 % (ref 0–1)
BUN SERPL-MCNC: 16 MG/DL (ref 5–25)
CALCIUM SERPL-MCNC: 9 MG/DL (ref 8.3–10.1)
CHLORIDE SERPL-SCNC: 106 MMOL/L (ref 100–108)
CHOLEST SERPL-MCNC: 98 MG/DL (ref 50–200)
CO2 SERPL-SCNC: 27 MMOL/L (ref 21–32)
CREAT SERPL-MCNC: 0.94 MG/DL (ref 0.6–1.3)
EOSINOPHIL # BLD AUTO: 0.1 THOUSAND/ΜL (ref 0–0.61)
EOSINOPHIL NFR BLD AUTO: 1 % (ref 0–6)
ERYTHROCYTE [DISTWIDTH] IN BLOOD BY AUTOMATED COUNT: 13.1 % (ref 11.6–15.1)
EST. AVERAGE GLUCOSE BLD GHB EST-MCNC: 134 MG/DL
GFR SERPL CREATININE-BSD FRML MDRD: 83 ML/MIN/1.73SQ M
GLUCOSE SERPL-MCNC: 144 MG/DL (ref 65–140)
HBA1C MFR BLD: 6.3 %
HCT VFR BLD AUTO: 41 % (ref 36.5–49.3)
HDLC SERPL-MCNC: 42 MG/DL
HGB BLD-MCNC: 13.6 G/DL (ref 12–17)
IMM GRANULOCYTES # BLD AUTO: 0.06 THOUSAND/UL (ref 0–0.2)
IMM GRANULOCYTES NFR BLD AUTO: 1 % (ref 0–2)
LDLC SERPL CALC-MCNC: 38 MG/DL (ref 0–100)
LYMPHOCYTES # BLD AUTO: 1.87 THOUSANDS/ΜL (ref 0.6–4.47)
LYMPHOCYTES NFR BLD AUTO: 17 % (ref 14–44)
MCH RBC QN AUTO: 29.9 PG (ref 26.8–34.3)
MCHC RBC AUTO-ENTMCNC: 33.2 G/DL (ref 31.4–37.4)
MCV RBC AUTO: 90 FL (ref 82–98)
MONOCYTES # BLD AUTO: 1.43 THOUSAND/ΜL (ref 0.17–1.22)
MONOCYTES NFR BLD AUTO: 13 % (ref 4–12)
NEUTROPHILS # BLD AUTO: 7.79 THOUSANDS/ΜL (ref 1.85–7.62)
NEUTS SEG NFR BLD AUTO: 67 % (ref 43–75)
NONHDLC SERPL-MCNC: 56 MG/DL
NRBC BLD AUTO-RTO: 0 /100 WBCS
P AXIS: 29 DEGREES
P AXIS: 49 DEGREES
PLATELET # BLD AUTO: 189 THOUSANDS/UL (ref 149–390)
PMV BLD AUTO: 10 FL (ref 8.9–12.7)
POTASSIUM SERPL-SCNC: 4 MMOL/L (ref 3.5–5.3)
PR INTERVAL: 158 MS
PR INTERVAL: 167 MS
QRS AXIS: 48 DEGREES
QRS AXIS: 61 DEGREES
QRSD INTERVAL: 88 MS
QRSD INTERVAL: 92 MS
QT INTERVAL: 421 MS
QT INTERVAL: 458 MS
QTC INTERVAL: 438 MS
QTC INTERVAL: 454 MS
RBC # BLD AUTO: 4.55 MILLION/UL (ref 3.88–5.62)
SODIUM SERPL-SCNC: 141 MMOL/L (ref 136–145)
T WAVE AXIS: 66 DEGREES
T WAVE AXIS: 69 DEGREES
TRIGL SERPL-MCNC: 89 MG/DL
TROPONIN I SERPL-MCNC: >40 NG/ML
TROPONIN I SERPL-MCNC: >40 NG/ML
VENTRICULAR RATE: 59 BPM
VENTRICULAR RATE: 65 BPM
WBC # BLD AUTO: 11.31 THOUSAND/UL (ref 4.31–10.16)

## 2021-03-16 PROCEDURE — 99233 SBSQ HOSP IP/OBS HIGH 50: CPT

## 2021-03-16 PROCEDURE — 93005 ELECTROCARDIOGRAM TRACING: CPT

## 2021-03-16 PROCEDURE — 93306 TTE W/DOPPLER COMPLETE: CPT

## 2021-03-16 PROCEDURE — 99232 SBSQ HOSP IP/OBS MODERATE 35: CPT | Performed by: INTERNAL MEDICINE

## 2021-03-16 PROCEDURE — 80048 BASIC METABOLIC PNL TOTAL CA: CPT | Performed by: INTERNAL MEDICINE

## 2021-03-16 PROCEDURE — 83036 HEMOGLOBIN GLYCOSYLATED A1C: CPT | Performed by: INTERNAL MEDICINE

## 2021-03-16 PROCEDURE — 84484 ASSAY OF TROPONIN QUANT: CPT | Performed by: INTERNAL MEDICINE

## 2021-03-16 PROCEDURE — 85025 COMPLETE CBC W/AUTO DIFF WBC: CPT | Performed by: INTERNAL MEDICINE

## 2021-03-16 PROCEDURE — 93010 ELECTROCARDIOGRAM REPORT: CPT

## 2021-03-16 PROCEDURE — 80061 LIPID PANEL: CPT | Performed by: INTERNAL MEDICINE

## 2021-03-16 RX ORDER — PANTOPRAZOLE SODIUM 40 MG/1
40 TABLET, DELAYED RELEASE ORAL
Status: DISCONTINUED | OUTPATIENT
Start: 2021-03-16 | End: 2021-03-18 | Stop reason: HOSPADM

## 2021-03-16 RX ADMIN — ASPIRIN 81 MG: 81 TABLET ORAL at 09:00

## 2021-03-16 RX ADMIN — TICAGRELOR 90 MG: 90 TABLET ORAL at 17:41

## 2021-03-16 RX ADMIN — ATORVASTATIN CALCIUM 80 MG: 80 TABLET, FILM COATED ORAL at 16:30

## 2021-03-16 RX ADMIN — DOCUSATE SODIUM 100 MG: 100 CAPSULE, LIQUID FILLED ORAL at 09:00

## 2021-03-16 RX ADMIN — DOCUSATE SODIUM 100 MG: 100 CAPSULE, LIQUID FILLED ORAL at 17:41

## 2021-03-16 RX ADMIN — ENOXAPARIN SODIUM 40 MG: 40 INJECTION SUBCUTANEOUS at 09:49

## 2021-03-16 RX ADMIN — PANTOPRAZOLE SODIUM 40 MG: 40 TABLET, DELAYED RELEASE ORAL at 16:30

## 2021-03-16 RX ADMIN — TICAGRELOR 90 MG: 90 TABLET ORAL at 09:00

## 2021-03-16 NOTE — UTILIZATION REVIEW
Initial Clinical Review    Admission: Date/Time/Statement:   Admission Orders (From admission, onward)     Ordered        03/15/21 1111  Inpatient Admission  Once                   Orders Placed This Encounter   Procedures    Inpatient Admission     Standing Status:   Standing     Number of Occurrences:   1     Order Specific Question:   Level of Care     Answer:   Level 2 Stepdown / HOT [14]     Order Specific Question:   Estimated length of stay     Answer:   More than 2 Midnights     Order Specific Question:   Certification     Answer:   I certify that inpatient services are medically necessary for this patient for a duration of greater than two midnights  See H&P and MD Progress Notes for additional information about the patient's course of treatment  ED Arrival Information     Expected Arrival Acuity Means of Arrival Escorted By Service Admission Type    3/15/2021 10:06 3/15/2021 10:08 Emergent Ambulance [address] Emergency    Arrival Complaint    chest pain        Chief Complaint   Patient presents with    Chest Pain     pt transported via EMS from home; pt developed CP while on his bike this morning; patient denies cardiac history or SOB     Assessment/Plan:    78 Y O male presents to ED via EMS [facility]  With dyspnea and exhaustion  While riding his bike,  Unusual for him  Going  Very slowly, still very dyspneic  Had to stop multiple times  Developed an acute onset of anterior chest pressure, initially  4/10 with belching  Seen in [facility] and  Found with abnormal EKG,  Given aspirin and sent to ED  In ED, again, EKG abnormal, minimal  ST elevations and started on  Nitroglycerin drip  PMH  Is impaired fasting glucose and hyperlipidemia  EKG  Shows   slight inferior ST-elevation, with lateral ST depressions  Initial troponin  0 48    Admit  Ip  ICU LOC  With  Acute  Coronary syndrome and plan is  Urgent cardiac cath, IV  Heparin, IV  NTG, cardiology consult and trend troponin/EKG  Cardiology consult  ( 3/15)     Presents with  Unstable angina, likely type  1 MI  Continue IV  Heparin, IV  NTG and wait cardiac cath  Cardiac cath  ( 3/15)    Right radial cath     LMCA: no signif disease  LAD: 90% mid (suggest PCI am 3/17/2021)  LCX: 50-60% ostial, 100% mid thrombotic culprit (2 5 x 22mm [place] EMILIA, post dilated 3 5 NC proximally)  RCA: no significant CAD     Plan: DAPT, GDMT CAD, return 3/17 PCI LAD    3/16     Acute   MI, severe  LAD disease  S/P  PCI  Hold beta blocker due to bradycardia  Keep Npo   After MN  3/16  For cath  3/17 for staged  PCI to LAD lesion  Pain free at present  IV  Heparin/IV  NTG  D/c  3/15      ED Triage Vitals   Temperature Pulse Respirations Blood Pressure SpO2   03/15/21 1151 03/15/21 1015 03/15/21 1015 03/15/21 1015 03/15/21 1015   97 8 °F (36 6 °C) 58 20 132/60 95 %      Temp Source Heart Rate Source Patient Position - Orthostatic VS BP Location FiO2 (%)   03/15/21 1151 03/15/21 1015 03/15/21 1045 03/15/21 1045 --   Oral Monitor Lying Right arm       Pain Score       03/15/21 1101       1          Wt Readings from Last 1 Encounters:   03/16/21 92 6 kg (204 lb 2 3 oz)     Additional Vital Signs:   16/21 0000  --  58  14  139/61  97  97 %  --  --   03/15/21 2200  98 9 °F (37 2 °C)  64  12  150/76  103  97 %  --  --   03/15/21 2100  --  64  16  159/65  95  98 %  --  --   03/15/21 2000  --  68  18  149/70  113  99 %  --  --   03/15/21 1900  --  66  18  146/61  97  98 %  --  --   03/15/21 1830  --  66  18  151/64  87  99 %  --  --   03/15/21 1800  --  64  19  --  --  100 %  --  --   03/15/21 1758  --  60  20  136/69  88  100 %  None (Room air)  --   03/15/21 1730  --  64  16  143/68  98  100 %  None (Room air)  --   03/15/21 1700  98 8 °F (37 1 °C)  66  21  130/52  89  100 %  None (Room air)  --   03/15/21 1630  --  68  18  146/58  96  99 %  --  --   03/15/21 1600  --  64  14  152/62  97  99 %  --  --   03/15/21 1530  --  60  14 149/61  92  99 %  --  --   03/15/21 1500  --  62  13  150/67  98  99 %  --  --   03/15/21 1430  --  58  18  149/73  96  99 %  --  --   03/15/21 1420  --  58  --  --  --  --  --  --   03/15/21 1400  --  54Abnormal   19  146/67  96  100 %  --  --   03/15/21 1330  --  54Abnormal   14  --  --  99 %  None (Room air)  --   03/15/21 1329  98 5 °F (36 9 °C)  --  --  --  --  --  --  --   03/15/21 1200  --  60  18  --  --  99 %  --  --   03/15/21 1155  97 8 °F (36 6 °C)  --  --  --  --  --  --  --   03/15/21 1151  97 8 °F (36 6 °C)  --  --  --  --  --  --  --   03/15/21 1145  --  64  17  142/63  91  99 %  None (Room air)  --   03/15/21 1115  --  58  20  136/65  91  99 %  None (Room air)  --   03/15/21 1113  --  56  20  143/65  --  98 %  None (Room air)  --   03/15/21 1045  --  58  20  152/65  94  100 %  None (Room air)  Lying   03/15/21 1040  --  55  20  132/66  --  100 %  None (Room air)  --   03/15/21 1017  --  --  --  133/66  --  --  --           Pertinent Labs/Diagnostic Test Results:   CXR  ( 3/15)     NAD  EKG  ( 3/15)  SB   Essentially unchanged but hand of elevation, oblique straightening in the inferolateral leads  Repeat  EKG  ( 3/15)     SB    ST segment elevation noted on lead: Perhaps a hint of elevation in AVF and V6 with some oblique straightening that is improved from the EKG which was performed at 8:29 a m  Christi Bella       Results from last 7 days   Lab Units 03/16/21  0519 03/15/21  1028   WBC Thousand/uL 11 31* 11 29*   HEMOGLOBIN g/dL 13 6 14 2   HEMATOCRIT % 41 0 43 1   PLATELETS Thousands/uL 189 217   NEUTROS ABS Thousands/µL 7 79* 9 29*         Results from last 7 days   Lab Units 03/16/21  0519 03/15/21  1028   SODIUM mmol/L 141 142   POTASSIUM mmol/L 4 0 3 9   CHLORIDE mmol/L 106 106   CO2 mmol/L 27 26   ANION GAP mmol/L 8 10   BUN mg/dL 16 26*   CREATININE mg/dL 0 94 0 91   EGFR ml/min/1 73sq m 83 86   CALCIUM mg/dL 9 0 9 0   MAGNESIUM mg/dL  --  2 1     Results from last 7 days   Lab Units 03/15/21  1028 AST U/L 26   ALT U/L 29   ALK PHOS U/L 107   TOTAL PROTEIN g/dL 6 8   ALBUMIN g/dL 3 6   TOTAL BILIRUBIN mg/dL 0 31         Results from last 7 days   Lab Units 03/16/21  0519 03/15/21  1028   GLUCOSE RANDOM mg/dL 144* 158*         Results from last 7 days   Lab Units 03/16/21  0519   HEMOGLOBIN A1C % 6 3*   EAG mg/dl 134       Results from last 7 days   Lab Units 03/16/21  0521 03/15/21  2215 03/15/21  1700 03/15/21  1028   TROPONIN I ng/mL >40 00* >40 00* >40 00* 0 48*         Results from last 7 days   Lab Units 03/15/21  1028   PROTIME seconds 13 9   INR  1 09   PTT seconds 29                         Results from last 7 days   Lab Units 03/15/21  1028   NT-PRO BNP pg/mL 69         ED Treatment:   Medication Administration from 03/15/2021 1006 to 03/15/2021 1328       Date/Time Order Dose Route Action Comments     03/15/2021 1300 nitroGLYcerin (TRIDIL) 50 mg in 250 mL infusion (premix) 0 mcg/min Intravenous Stopped      03/15/2021 1151 nitroGLYcerin (TRIDIL) 50 mg in 250 mL infusion (premix) 20 mcg/min Intravenous Rate/Dose Change      03/15/2021 1054 nitroGLYcerin (TRIDIL) 50 mg in 250 mL infusion (premix) 15 mcg/min Intravenous Rate/Dose Change      03/15/2021 1026 nitroGLYcerin (TRIDIL) 50 mg in 250 mL infusion (premix) 10 mcg/min Intravenous New Bag      03/15/2021 1058 heparin (ACS LOW)   Intravenous Not Given cancelled order     03/15/2021 1050 ticagrelor (BRILINTA) tablet 180 mg 180 mg Oral Given      03/15/2021 1048 heparin (porcine) injection 4,000 Units 4,000 Units Intravenous Given      03/15/2021 1300 heparin (porcine) 25,000 units in 0 45% NaCl 250 mL infusion (premix) 0 Units/kg/hr Intravenous Stopped      03/15/2021 1055 heparin (porcine) 25,000 units in 0 45% NaCl 250 mL infusion (premix) 11 1 Units/kg/hr Intravenous New Bag      03/15/2021 1231 fentanyl citrate (PF) 100 MCG/2ML 50 mcg Intravenous Given      03/15/2021 1221 fentanyl citrate (PF) 100 MCG/2ML 50 mcg Intravenous Given 03/15/2021 1231 midazolam (VERSED) injection 2 mg Intravenous Given      03/15/2021 1221 midazolam (VERSED) injection 2 mg Intravenous Given      03/15/2021 1229 lidocaine (PF) (XYLOCAINE-MPF) 1 % injection 1 mL Infiltration Given right wrist     03/15/2021 1231 nitroGLYcerin (TRIDIL) 50 mg in 250 mL infusion (premix) 200 mcg Intra-arterial Given      03/15/2021 1231 verapamil (ISOPTIN) injection 2 5 mg  Given      03/15/2021 1234 heparin (porcine) injection 6,000 Units Intravenous Given      03/15/2021 1255 nitroPRUSside (NIPRIDE) injection 150 mcg Other Given      03/15/2021 1254 nitroPRUSside (NIPRIDE) injection 150 mcg Other Given      03/15/2021 1256 ondansetron (ZOFRAN) injection 4 mg Intravenous Given      03/15/2021 1259 iohexol (OMNIPAQUE) 350 MG/ML injection (SINGLE-DOSE) 130 mL Intravenous Given         Present on Admission:   Hyperlipidemia   IFG (impaired fasting glucose)      Admitting Diagnosis: Chest pain [R07 9]  Acute chest pain [R07 9]  [facility] (non-ST elevated [facility]) [facility] ) [I21 4]  Age/Sex: 78 y o  male  Admission Orders:  Scheduled Medications:  aspirin, 81 mg, Oral, Daily  atorvastatin, 80 mg, Oral, Daily With Dinner  docusate sodium, 100 mg, Oral, BID  enoxaparin, 40 mg, Subcutaneous, Q24H [address]  ticagrelor, 90 mg, Oral, BID      Continuous IV Infusions:  IV   Heparin - d/c   3/15  IV  Nitroglycerin   - d/c  3/15     PRN Meds:  acetaminophen, 650 mg, Oral, Q4H PRN  ondansetron, 4 mg, Intravenous, Q6H PRN        IP CONSULT TO CARDIOLOGY     48  Hr  Tele      Network Utilization Review Department  ATTENTION: Please call with any questions or concerns to [phone] and carefully listen to the prompts so that you are directed to the right person  All voicemails are confidential   Abdi Duron all requests for admission clinical reviews, approved or denied determinations and any other requests to dedicated fax number below belonging to the campus where the patient is receiving treatment  List of dedicated fax numbers for the Facilities:  [address] [place] [address] DENIALS (Administrative/Medical Necessity) [phone] [address] (Maternity/NICU/Pediatrics) [phone] [address] [place] [address] [address] [facility] [address] Violetta Stevens 0305 (  Sakina Fischer "Anushka" 103) [address] [place] [address] Maddie Dick 1481 [address] [phone]

## 2021-03-16 NOTE — ASSESSMENT & PLAN NOTE
-patient has a history of chronic impaired fasting glucose  -continue monitor blood sugar  -A1c pending

## 2021-03-16 NOTE — PROGRESS NOTES
Progress Note - Cardiology   Rosa Buck 78 y o  male MRN: [id number]  Unit/Bed#: ICU 08 Encounter: [id number]      Assessment/Recommendations/Discussion:   1  Acute myocardial infarction with left circumflex thrombosis, severe LAD disease  2  Dyslipidemia  3  Impaired fasting glucose    PLAN   Status post PCI to left circumflex yesterday   Continue aspirin, Brilinta, Lipitor [de-identified]   Hold off on beta-blocker as he is relatively bradycardic   Plan to keep NPO after midnight for catheterization tomorrow for staged PCI to LAD lesion   Echo with preserved EF        Subjective:   HPI  Doing well today  Denies chest pain shortness of breath  No significant arrhythmias noted on telemetry      Review of Systems: As noted in HPI  Rest of ROS is negative  Vitals:   /67   Pulse 64   Temp 98 3 °F (36 8 °C) (Temporal)   Resp 14   Ht 5' 11" (1 803 m)   Wt 92 6 kg (204 lb 2 3 oz)   SpO2 97%   BMI 28 47 kg/m²   Vitals:    03/15/21 1649 03/16/21 0200   Weight: 92 6 kg (204 lb 2 3 oz) 92 6 kg (204 lb 2 3 oz)       Intake/Output Summary (Last 24 hours) at 3/16/2021 1109  Last data filed at 3/16/2021 0950  Gross per 24 hour   Intake 1210 ml   Output 5850 ml   Net -4640 ml       Physical Exam:  General appearance: alert and oriented, in no acute distress  Head: Normocephalic, without obvious abnormality, atraumatic  Eyes: conjunctivae/corneas clear  Anicteric    Neck: no adenopathy, no carotid bruit, no JVD  Lungs: clear to auscultation bilaterally  Heart: regular rate and rhythm, S1, S2 normal, no murmur, no click, rub or gallop  Abdomen:  soft, non-tender; bowel sounds normal; no masses,  no organomegaly  Extremities: extremities normal, warm and well-perfused; no cyanosis, clubbing, or edema  Skin: Skin color, texture, turgor normal  No rashes or lesions     TELEMETRY:  Occasional PVCs noted, a very short 3 beat run of ventricular triplets noted, no sustained arrhythmia    Lab Results:  Results from last 7 days Lab Units 03/16/21  0519   WBC Thousand/uL 11 31*   HEMOGLOBIN g/dL 13 6   HEMATOCRIT % 41 0   PLATELETS Thousands/uL 189     Results from last 7 days   Lab Units 03/16/21  0519 03/15/21  1028   POTASSIUM mmol/L 4 0 3 9   CHLORIDE mmol/L 106 106   CO2 mmol/L 27 26   BUN mg/dL 16 26*   CREATININE mg/dL 0 94 0 91   CALCIUM mg/dL 9 0 9 0   ALK PHOS U/L  --  107   ALT U/L  --  29   AST U/L  --  26     Results from last 7 days   Lab Units 03/16/21  0519   POTASSIUM mmol/L 4 0   CHLORIDE mmol/L 106   CO2 mmol/L 27   BUN mg/dL 16   CREATININE mg/dL 0 94   CALCIUM mg/dL 9 0           Medications:    Current Facility-Administered Medications:     acetaminophen (TYLENOL) tablet 650 mg, 650 mg, Oral, Q4H PRN, Judith Daugherty MD    aspirin (ECOTRIN LOW STRENGTH) EC tablet 81 mg, 81 mg, Oral, Daily, Judith Daugherty MD, 81 mg at 03/16/21 0900    atorvastatin (LIPITOR) tablet 80 mg, 80 mg, Oral, Daily With Leticia Domingo DO, 80 mg at 03/15/21 1816    docusate sodium (COLACE) capsule 100 mg, 100 mg, Oral, BID, Judith Daugherty MD, 100 mg at 03/16/21 0900    enoxaparin (LOVENOX) subcutaneous injection 40 mg, 40 mg, Subcutaneous, Q24H [facility], Judith Daugherty MD, 40 mg at 03/16/21 0949    ondansetron (ZOFRAN) injection 4 mg, 4 mg, Intravenous, Q6H PRN, Judith Daugherty MD    ticagrelor [facility]) tablet 90 mg, 90 mg, Oral, BID, Albertina Carpio DO, 90 mg at 03/16/21 0900    This note was completed in part utilizing M-Modal Fluency Direct Software  Grammatical errors, random word insertions, spelling mistakes, and incomplete sentences may be an occasional consequence of this system secondary to software limitations, ambient noise, and hardware issues  If you have any questions or concerns about the content, text, or information contained within the body of this dictation, please contact the provider for clarification      Hussain Batista DO, [facility]  3/16/2021 11:09 AM

## 2021-03-16 NOTE — PROGRESS NOTES
03/16/21 1100   Spiritual Beliefs/Perceptions   Support Systems Spouse/significant other   Plan of Care   Comments Brief visit today with patient, offer pastoral presence

## 2021-03-16 NOTE — ASSESSMENT & PLAN NOTE
-patient presented to the ER new onset chest pressure with exertion, and decreased exercise tolerance  -en route patient was given 2 nitroglycerin sublingually, 324 mg of aspirin  -in the ER he was started on heparin infusion ACS protocol, nitroglycerin infusion, and loaded with Brilinta  -was evaluated by the cardiology team and underwent urgent cardiac catheterization 3/15 that revealed a 100% lesion in the mid circumflex was treated with drug-eluting stent    Patient is a 90% lesion in the LAD with planned PCI tomorrow  -patient is currently pain-free and hemodynamically stable  -continue aspirin 81 mg daily, Brilinta 90 mg b i d , Lipitor 80 mg daily  -patient not initiated on a beta-blocker due to resting bradycardia  -await catheterization with intervention on LAD lesion tomorrow  -patient's elevated troponin peaked at greater than 40, likely secondary to a type 1 MI, secondary to his underlying coronary artery disease  -2D echocardiogram report pending

## 2021-03-16 NOTE — PROGRESS NOTES
[place] 50  Progress Note - Tim Soares 1950, 78 y o  male MRN: [id number]  Unit/Bed#: ICU 08 Encounter: [id number]  Primary Care Provider: July Claros MD   Date and time admitted to hospital: 3/15/2021 10:08 AM    * Acute coronary syndrome [facility])  Assessment & Plan  -patient presented to the ER new onset chest pressure with exertion, and decreased exercise tolerance  -en route patient was given 2 nitroglycerin sublingually, 324 mg of aspirin  -in the ER he was started on heparin infusion ACS protocol, nitroglycerin infusion, and loaded with Brilinta  -was evaluated by the cardiology team and underwent urgent cardiac catheterization 3/15 that revealed a 100% lesion in the mid circumflex was treated with drug-eluting stent    Patient is a 90% lesion in the LAD with planned PCI tomorrow  -patient is currently pain-free and hemodynamically stable  -continue aspirin 81 mg daily, Brilinta 90 mg b i d , Lipitor 80 mg daily  -patient not initiated on a beta-blocker due to resting bradycardia  -await catheterization with intervention on LAD lesion tomorrow  -patient's elevated troponin peaked at greater than 40, likely secondary to a type 1 MI, secondary to his underlying coronary artery disease  -2D echocardiogram report pending      Murmur  Assessment & Plan  -patient is noted to have a heart murmur, most prominent at the apex  -await 2D echocardiogram report    Hyperlipidemia  Assessment & Plan  -continue statin    IFG (impaired fasting glucose)  Assessment & Plan  -patient has a history of chronic impaired fasting glucose  -continue monitor blood sugar  -A1c pending            Family:  Called pt's wife Buddy Dominguez and LM to call my cell number for update    D/w pts ICU nurse  D/w     VTE Pharmacologic Prophylaxis: Enoxaparin (Lovenox)- pt off heparin drip  VTE Mechanical Prophylaxis: sequential compression device        Certification Statement: The patient will continue to require additional inpatient hospital stay due to need for further acute intervention for cath with PCI in am    Status: inpatient     ===================================================================    Subjective:  Patient relates that he feels well this morning  He denies any chest pain, pressure, heaviness, tightness or discomfort  He notes his previous chest pressure has completely resolved  He denies any pain or discomfort anywhere else  He denies any shortness of breath or chest congestion  He denies any abdominal pain, nausea, vomiting, diarrhea  He denies any numbness or tingling or decreased sensation  Physical Exam:   Temp:  [97 8 °F (36 6 °C)-98 9 °F (37 2 °C)] 98 3 °F (36 8 °C)  HR:  [52-78] 68  Resp:  [12-33] 14  BP: (120-159)/(52-76) 143/69    Gen:  Pleasant, non-tachypnic, non-dyspnic  Conversant  Heart: regular rate and rhythm, S1S2 present  1/6 systolic ejection murmur loudest in the apex  No rub or gallop  Lungs: clear to ausculatation bilaterally  No wheezing, crackles, or rhonchi  No accessory muscle use or respiratory distress  Abd: soft, non-tender, non-distended  NABS, no guarding, rebound or peritoneal signs  Extremities: no clubbing, cyanosis or edema  2+pedal pulses bilaterally  Full range of motion  Neuro: awake, alert  Fluent speech  Moving all 4 extremities  Skin: warm and dry: no petechiae, purpura and rash      LABS:   Results from last 7 days   Lab Units 03/16/21  0519 03/15/21  1028   WBC Thousand/uL 11 31* 11 29*   HEMOGLOBIN g/dL 13 6 14 2   HEMATOCRIT % 41 0 43 1   PLATELETS Thousands/uL 189 217     Results from last 7 days   Lab Units 03/16/21  0519 03/15/21  1028   POTASSIUM mmol/L 4 0 3 9   CHLORIDE mmol/L 106 106   CO2 mmol/L 27 26   BUN mg/dL 16 26*   CREATININE mg/dL 0 94 0 91   CALCIUM mg/dL 9 0 9 0       Hospital Data:    3/15 chest x-ray:  No acute cardiopulmonary disease    2D echocardiogram:  Pending report    Procedure  3/15:  Cardiac catheterization  CORONARY CIRCULATION:  There was 2-vessel coronary artery disease  Mid LAD: There was a 100 % stenosis  1st diagonal: There was a 50 % stenosis at the ostium of the vessel segment  Ostial circumflex: There was a 55 % stenosis  Mid circumflex: There was a 100 % stenosis  1ST LESION INTERVENTIONS:  A balloon angioplasty procedure was performed on the lesion in the mid circumflex  A Resolute Goltry Rx 2 5 x 22mm drug-eluting stent was placed across the lesion and deployed at a maximum inflation pressure of 12 joycelyn       ---------------------------------------------------------------------------------------------------------------  This note has been constructed using a voice recognition system

## 2021-03-16 NOTE — PLAN OF CARE
Problem: Potential for Falls  Goal: Patient will remain free of falls  Description: INTERVENTIONS:  - Assess patient frequently for physical needs  -  Identify cognitive and physical deficits and behaviors that affect risk of falls    -  [place] fall precautions as indicated by assessment   - Educate patient/family on patient safety including physical limitations  - Instruct patient to call for assistance with activity based on assessment  - Modify environment to reduce risk of injury  - Consider OT/PT consult to assist with strengthening/mobility  Outcome: Progressing

## 2021-03-16 NOTE — PLAN OF CARE
Problem: Potential for Falls  Goal: Patient will remain free of falls  Description: INTERVENTIONS:  - Assess patient frequently for physical needs  -  Identify cognitive and physical deficits and behaviors that affect risk of falls    -  [place] fall precautions as indicated by assessment   - Educate patient/family on patient safety including physical limitations  - Instruct patient to call for assistance with activity based on assessment  - Modify environment to reduce risk of injury  - Consider OT/PT consult to assist with strengthening/mobility  Outcome: Progressing     Problem: Prexisting or High Potential for Compromised Skin Integrity  Goal: Skin integrity is maintained or improved  Description: INTERVENTIONS:  - Identify patients at risk for skin breakdown  - Assess and monitor skin integrity  - Assess and monitor nutrition and hydration status  - Monitor labs   - Assess for incontinence   - Turn and reposition patient  - Assist with mobility/ambulation  - Relieve pressure over bony prominences  - Avoid friction and shearing  - Provide appropriate hygiene as needed including keeping skin clean and dry  - Evaluate need for skin moisturizer/barrier cream  - Collaborate with interdisciplinary team   - Patient/family teaching  - Consider wound care consult   Outcome: Progressing     Problem: PAIN - ADULT  Goal: Verbalizes/displays adequate comfort level or baseline comfort level  Description: Interventions:  - Encourage patient to monitor pain and request assistance  - Assess pain using appropriate pain scale  - Administer analgesics based on type and severity of pain and evaluate response  - Implement non-pharmacological measures as appropriate and evaluate response  - Consider cultural and social influences on pain and pain management  - Notify physician/advanced practitioner if interventions unsuccessful or patient reports new pain  Outcome: Progressing     Problem: INFECTION - ADULT  Goal: Absence or prevention of progression during hospitalization  Description: INTERVENTIONS:  - Assess and monitor for signs and symptoms of infection  - Monitor lab/diagnostic results  - Monitor all insertion sites, i e  indwelling lines, tubes, and drains  - Monitor endotracheal if appropriate and nasal secretions for changes in amount and color  - [place] appropriate cooling/warming therapies per order  - Administer medications as ordered  - Instruct and encourage patient and family to use good hand hygiene technique  - Identify and instruct in appropriate isolation precautions for identified infection/condition  Outcome: Progressing  Goal: Absence of fever/infection during neutropenic period  Description: INTERVENTIONS:  - Monitor WBC    Outcome: Progressing     Problem: SAFETY ADULT  Goal: Patient will remain free of falls  Description: INTERVENTIONS:  - Assess patient frequently for physical needs  -  Identify cognitive and physical deficits and behaviors that affect risk of falls    -  [place] fall precautions as indicated by assessment   - Educate patient/family on patient safety including physical limitations  - Instruct patient to call for assistance with activity based on assessment  - Modify environment to reduce risk of injury  - Consider OT/PT consult to assist with strengthening/mobility  Outcome: Progressing  Goal: Maintain or return to baseline ADL function  Description: INTERVENTIONS:  -  Assess patient's ability to carry out ADLs; assess patient's baseline for ADL function and identify physical deficits which impact ability to perform ADLs (bathing, care of mouth/teeth, toileting, grooming, dressing, etc )  - Assess/evaluate cause of self-care deficits   - Assess range of motion  - Assess patient's mobility; develop plan if impaired  - Assess patient's need for assistive devices and provide as appropriate  - Encourage maximum independence but intervene and supervise when necessary  - Involve family in performance of ADLs  - Assess for home care needs following discharge   - Consider OT consult to assist with ADL evaluation and planning for discharge  - Provide patient education as appropriate  Outcome: Progressing  Goal: Maintain or return mobility status to optimal level  Description: INTERVENTIONS:  - Assess patient's baseline mobility status (ambulation, transfers, stairs, etc )    - Identify cognitive and physical deficits and behaviors that affect mobility  - Identify mobility aids required to assist with transfers and/or ambulation (gait belt, sit-to-stand, lift, walker, cane, etc )  - [place] fall precautions as indicated by assessment  - Record patient progress and toleration of activity level on Mobility SBAR; progress patient to next Phase/Stage  - Instruct patient to call for assistance with activity based on assessment  - Consider rehabilitation consult to assist with strengthening/weightbearing, etc   Outcome: Progressing     Problem: DISCHARGE PLANNING  Goal: Discharge to home or other facility with appropriate resources  Description: INTERVENTIONS:  - Identify barriers to discharge w/patient and caregiver  - Arrange for needed discharge resources and transportation as appropriate  - Identify discharge learning needs (meds, wound care, etc )  - Arrange for interpretive services to assist at discharge as needed  - Refer to Case Management Department for coordinating discharge planning if the patient needs post-hospital services based on physician/advanced practitioner order or complex needs related to functional status, cognitive ability, or social support system  Outcome: Progressing     Problem: Knowledge Deficit  Goal: Patient/family/caregiver demonstrates understanding of disease process, treatment plan, medications, and discharge instructions  Description: Complete learning assessment and assess knowledge base    Interventions:  - Provide teaching at level of understanding  - Provide teaching via preferred learning methods  Outcome: Progressing

## 2021-03-16 NOTE — ASSESSMENT & PLAN NOTE
-patient is noted to have a heart murmur, most prominent at the apex  -await 2D echocardiogram report

## 2021-03-17 ENCOUNTER — APPOINTMENT (INPATIENT)
Dept: NON INVASIVE DIAGNOSTICS | Facility: HOSPITAL | Age: 71
DRG: 247 | End: 2021-03-17
Payer: COMMERCIAL

## 2021-03-17 LAB
ANION GAP SERPL CALCULATED.3IONS-SCNC: 11 MMOL/L (ref 4–13)
BASOPHILS # BLD AUTO: 0.05 THOUSANDS/ΜL (ref 0–0.1)
BASOPHILS NFR BLD AUTO: 1 % (ref 0–1)
BUN SERPL-MCNC: 19 MG/DL (ref 5–25)
CALCIUM SERPL-MCNC: 8.7 MG/DL (ref 8.3–10.1)
CHLORIDE SERPL-SCNC: 108 MMOL/L (ref 100–108)
CO2 SERPL-SCNC: 25 MMOL/L (ref 21–32)
CREAT SERPL-MCNC: 0.96 MG/DL (ref 0.6–1.3)
EOSINOPHIL # BLD AUTO: 0.15 THOUSAND/ΜL (ref 0–0.61)
EOSINOPHIL NFR BLD AUTO: 2 % (ref 0–6)
ERYTHROCYTE [DISTWIDTH] IN BLOOD BY AUTOMATED COUNT: 13 % (ref 11.6–15.1)
GFR SERPL CREATININE-BSD FRML MDRD: 81 ML/MIN/1.73SQ M
GLUCOSE SERPL-MCNC: 127 MG/DL (ref 65–140)
HCT VFR BLD AUTO: 41.1 % (ref 36.5–49.3)
HGB BLD-MCNC: 13.6 G/DL (ref 12–17)
IMM GRANULOCYTES # BLD AUTO: 0.03 THOUSAND/UL (ref 0–0.2)
IMM GRANULOCYTES NFR BLD AUTO: 0 % (ref 0–2)
KCT BLD-ACNC: 299 SEC (ref 89–137)
LYMPHOCYTES # BLD AUTO: 1.6 THOUSANDS/ΜL (ref 0.6–4.47)
LYMPHOCYTES NFR BLD AUTO: 22 % (ref 14–44)
MCH RBC QN AUTO: 29.4 PG (ref 26.8–34.3)
MCHC RBC AUTO-ENTMCNC: 33.1 G/DL (ref 31.4–37.4)
MCV RBC AUTO: 89 FL (ref 82–98)
MONOCYTES # BLD AUTO: 0.98 THOUSAND/ΜL (ref 0.17–1.22)
MONOCYTES NFR BLD AUTO: 13 % (ref 4–12)
NEUTROPHILS # BLD AUTO: 4.49 THOUSANDS/ΜL (ref 1.85–7.62)
NEUTS SEG NFR BLD AUTO: 62 % (ref 43–75)
NRBC BLD AUTO-RTO: 0 /100 WBCS
PLATELET # BLD AUTO: 188 THOUSANDS/UL (ref 149–390)
PMV BLD AUTO: 10.1 FL (ref 8.9–12.7)
POTASSIUM SERPL-SCNC: 3.9 MMOL/L (ref 3.5–5.3)
RBC # BLD AUTO: 4.62 MILLION/UL (ref 3.88–5.62)
SODIUM SERPL-SCNC: 144 MMOL/L (ref 136–145)
SPECIMEN SOURCE: ABNORMAL
WBC # BLD AUTO: 7.3 THOUSAND/UL (ref 4.31–10.16)

## 2021-03-17 PROCEDURE — 99152 MOD SED SAME PHYS/QHP 5/>YRS: CPT

## 2021-03-17 PROCEDURE — 99152 MOD SED SAME PHYS/QHP 5/>YRS: CPT | Performed by: INTERNAL MEDICINE

## 2021-03-17 PROCEDURE — C1769 GUIDE WIRE: HCPCS

## 2021-03-17 PROCEDURE — 027034Z DILATION OF CORONARY ARTERY, ONE ARTERY WITH DRUG-ELUTING INTRALUMINAL DEVICE, PERCUTANEOUS APPROACH: ICD-10-PCS | Performed by: INTERNAL MEDICINE

## 2021-03-17 PROCEDURE — 99232 SBSQ HOSP IP/OBS MODERATE 35: CPT | Performed by: PHYSICIAN ASSISTANT

## 2021-03-17 PROCEDURE — C1725 CATH, TRANSLUMIN NON-LASER: HCPCS

## 2021-03-17 PROCEDURE — 92921 HB PRQ CARDIAC ANGIO ADDL ART: CPT

## 2021-03-17 PROCEDURE — B2101ZZ FLUOROSCOPY OF SINGLE CORONARY ARTERY USING LOW OSMOLAR CONTRAST: ICD-10-PCS | Performed by: INTERNAL MEDICINE

## 2021-03-17 PROCEDURE — C1887 CATHETER, GUIDING: HCPCS

## 2021-03-17 PROCEDURE — 80048 BASIC METABOLIC PNL TOTAL CA: CPT | Performed by: INTERNAL MEDICINE

## 2021-03-17 PROCEDURE — C1894 INTRO/SHEATH, NON-LASER: HCPCS

## 2021-03-17 PROCEDURE — C1874 STENT, COATED/COV W/DEL SYS: HCPCS

## 2021-03-17 PROCEDURE — 99232 SBSQ HOSP IP/OBS MODERATE 35: CPT

## 2021-03-17 PROCEDURE — 92928 PRQ TCAT PLMT NTRAC ST 1 LES: CPT | Performed by: INTERNAL MEDICINE

## 2021-03-17 PROCEDURE — 99153 MOD SED SAME PHYS/QHP EA: CPT

## 2021-03-17 PROCEDURE — C9600 PERC DRUG-EL COR STENT SING: HCPCS

## 2021-03-17 PROCEDURE — 85025 COMPLETE CBC W/AUTO DIFF WBC: CPT | Performed by: INTERNAL MEDICINE

## 2021-03-17 PROCEDURE — 85347 COAGULATION TIME ACTIVATED: CPT

## 2021-03-17 RX ORDER — NITROGLYCERIN 20 MG/100ML
INJECTION INTRAVENOUS CODE/TRAUMA/SEDATION MEDICATION
Status: COMPLETED | OUTPATIENT
Start: 2021-03-17 | End: 2021-03-17

## 2021-03-17 RX ORDER — SODIUM CHLORIDE 9 MG/ML
100 INJECTION, SOLUTION INTRAVENOUS CONTINUOUS
Status: CANCELLED | OUTPATIENT
Start: 2021-03-17 | End: 2021-03-17

## 2021-03-17 RX ORDER — VERAPAMIL HYDROCHLORIDE 2.5 MG/ML
INJECTION, SOLUTION INTRAVENOUS CODE/TRAUMA/SEDATION MEDICATION
Status: COMPLETED | OUTPATIENT
Start: 2021-03-17 | End: 2021-03-17

## 2021-03-17 RX ORDER — FENTANYL CITRATE 50 UG/ML
INJECTION, SOLUTION INTRAMUSCULAR; INTRAVENOUS CODE/TRAUMA/SEDATION MEDICATION
Status: COMPLETED | OUTPATIENT
Start: 2021-03-17 | End: 2021-03-17

## 2021-03-17 RX ORDER — MIDAZOLAM HYDROCHLORIDE 2 MG/2ML
INJECTION, SOLUTION INTRAMUSCULAR; INTRAVENOUS CODE/TRAUMA/SEDATION MEDICATION
Status: COMPLETED | OUTPATIENT
Start: 2021-03-17 | End: 2021-03-17

## 2021-03-17 RX ORDER — HEPARIN SODIUM 1000 [USP'U]/ML
INJECTION, SOLUTION INTRAVENOUS; SUBCUTANEOUS CODE/TRAUMA/SEDATION MEDICATION
Status: COMPLETED | OUTPATIENT
Start: 2021-03-17 | End: 2021-03-17

## 2021-03-17 RX ADMIN — TICAGRELOR 90 MG: 90 TABLET ORAL at 08:05

## 2021-03-17 RX ADMIN — MIDAZOLAM 2 MG: 1 INJECTION INTRAMUSCULAR; INTRAVENOUS at 10:44

## 2021-03-17 RX ADMIN — DOCUSATE SODIUM 100 MG: 100 CAPSULE, LIQUID FILLED ORAL at 08:05

## 2021-03-17 RX ADMIN — HEPARIN SODIUM 6000 UNITS: 1000 INJECTION INTRAVENOUS; SUBCUTANEOUS at 10:46

## 2021-03-17 RX ADMIN — VERAPAMIL HYDROCHLORIDE 2.5 MG: 2.5 INJECTION, SOLUTION INTRAVENOUS at 10:45

## 2021-03-17 RX ADMIN — ATORVASTATIN CALCIUM 80 MG: 80 TABLET, FILM COATED ORAL at 16:04

## 2021-03-17 RX ADMIN — MIDAZOLAM 2 MG: 1 INJECTION INTRAMUSCULAR; INTRAVENOUS at 10:53

## 2021-03-17 RX ADMIN — TICAGRELOR 90 MG: 90 TABLET ORAL at 17:22

## 2021-03-17 RX ADMIN — FENTANYL CITRATE 50 MCG: 50 INJECTION, SOLUTION INTRAMUSCULAR; INTRAVENOUS at 10:53

## 2021-03-17 RX ADMIN — ENOXAPARIN SODIUM 40 MG: 40 INJECTION SUBCUTANEOUS at 08:05

## 2021-03-17 RX ADMIN — NITROGLYCERIN 200 MCG: 20 INJECTION INTRAVENOUS at 10:44

## 2021-03-17 RX ADMIN — DOCUSATE SODIUM 100 MG: 100 CAPSULE, LIQUID FILLED ORAL at 17:23

## 2021-03-17 RX ADMIN — ACETAMINOPHEN 650 MG: 325 TABLET ORAL at 16:07

## 2021-03-17 RX ADMIN — FENTANYL CITRATE 50 MCG: 50 INJECTION, SOLUTION INTRAMUSCULAR; INTRAVENOUS at 10:44

## 2021-03-17 RX ADMIN — NITROGLYCERIN 200 MCG: 20 INJECTION INTRAVENOUS at 11:01

## 2021-03-17 RX ADMIN — ASPIRIN 81 MG: 81 TABLET ORAL at 08:05

## 2021-03-17 RX ADMIN — HEPARIN SODIUM 4000 UNITS: 1000 INJECTION INTRAVENOUS; SUBCUTANEOUS at 10:44

## 2021-03-17 RX ADMIN — IOHEXOL 100 ML: 350 INJECTION, SOLUTION INTRAVENOUS at 11:15

## 2021-03-17 NOTE — PLAN OF CARE
Problem: Potential for Falls  Goal: Patient will remain free of falls  Description: INTERVENTIONS:  - Assess patient frequently for physical needs  -  Identify cognitive and physical deficits and behaviors that affect risk of falls    -  [place] fall precautions as indicated by assessment   - Educate patient/family on patient safety including physical limitations  - Instruct patient to call for assistance with activity based on assessment  - Modify environment to reduce risk of injury  - Consider OT/PT consult to assist with strengthening/mobility  Outcome: Progressing     Problem: Prexisting or High Potential for Compromised Skin Integrity  Goal: Skin integrity is maintained or improved  Description: INTERVENTIONS:  - Identify patients at risk for skin breakdown  - Assess and monitor skin integrity  - Assess and monitor nutrition and hydration status  - Monitor labs   - Assess for incontinence   - Turn and reposition patient  - Assist with mobility/ambulation  - Relieve pressure over bony prominences  - Avoid friction and shearing  - Provide appropriate hygiene as needed including keeping skin clean and dry  - Evaluate need for skin moisturizer/barrier cream  - Collaborate with interdisciplinary team   - Patient/family teaching  - Consider wound care consult   Outcome: Progressing     Problem: PAIN - ADULT  Goal: Verbalizes/displays adequate comfort level or baseline comfort level  Description: Interventions:  - Encourage patient to monitor pain and request assistance  - Assess pain using appropriate pain scale  - Administer analgesics based on type and severity of pain and evaluate response  - Implement non-pharmacological measures as appropriate and evaluate response  - Consider cultural and social influences on pain and pain management  - Notify physician/advanced practitioner if interventions unsuccessful or patient reports new pain  Outcome: Progressing     Problem: INFECTION - ADULT  Goal: Absence or prevention of progression during hospitalization  Description: INTERVENTIONS:  - Assess and monitor for signs and symptoms of infection  - Monitor lab/diagnostic results  - Monitor all insertion sites, i e  indwelling lines, tubes, and drains  - Monitor endotracheal if appropriate and nasal secretions for changes in amount and color  - [place] appropriate cooling/warming therapies per order  - Administer medications as ordered  - Instruct and encourage patient and family to use good hand hygiene technique  - Identify and instruct in appropriate isolation precautions for identified infection/condition  Outcome: Progressing  Goal: Absence of fever/infection during neutropenic period  Description: INTERVENTIONS:  - Monitor WBC    Outcome: Progressing     Problem: SAFETY ADULT  Goal: Patient will remain free of falls  Description: INTERVENTIONS:  - Assess patient frequently for physical needs  -  Identify cognitive and physical deficits and behaviors that affect risk of falls    -  [place] fall precautions as indicated by assessment   - Educate patient/family on patient safety including physical limitations  - Instruct patient to call for assistance with activity based on assessment  - Modify environment to reduce risk of injury  - Consider OT/PT consult to assist with strengthening/mobility  Outcome: Progressing  Goal: Maintain or return to baseline ADL function  Description: INTERVENTIONS:  -  Assess patient's ability to carry out ADLs; assess patient's baseline for ADL function and identify physical deficits which impact ability to perform ADLs (bathing, care of mouth/teeth, toileting, grooming, dressing, etc )  - Assess/evaluate cause of self-care deficits   - Assess range of motion  - Assess patient's mobility; develop plan if impaired  - Assess patient's need for assistive devices and provide as appropriate  - Encourage maximum independence but intervene and supervise when necessary  - Involve family in performance of ADLs  - Assess for home care needs following discharge   - Consider OT consult to assist with ADL evaluation and planning for discharge  - Provide patient education as appropriate  Outcome: Progressing  Goal: Maintain or return mobility status to optimal level  Description: INTERVENTIONS:  - Assess patient's baseline mobility status (ambulation, transfers, stairs, etc )    - Identify cognitive and physical deficits and behaviors that affect mobility  - Identify mobility aids required to assist with transfers and/or ambulation (gait belt, sit-to-stand, lift, walker, cane, etc )  - [place] fall precautions as indicated by assessment  - Record patient progress and toleration of activity level on Mobility SBAR; progress patient to next Phase/Stage  - Instruct patient to call for assistance with activity based on assessment  - Consider rehabilitation consult to assist with strengthening/weightbearing, etc   Outcome: Progressing     Problem: DISCHARGE PLANNING  Goal: Discharge to home or other facility with appropriate resources  Description: INTERVENTIONS:  - Identify barriers to discharge w/patient and caregiver  - Arrange for needed discharge resources and transportation as appropriate  - Identify discharge learning needs (meds, wound care, etc )  - Arrange for interpretive services to assist at discharge as needed  - Refer to Case Management Department for coordinating discharge planning if the patient needs post-hospital services based on physician/advanced practitioner order or complex needs related to functional status, cognitive ability, or social support system  Outcome: Progressing     Problem: Knowledge Deficit  Goal: Patient/family/caregiver demonstrates understanding of disease process, treatment plan, medications, and discharge instructions  Description: Complete learning assessment and assess knowledge base    Interventions:  - Provide teaching at level of understanding  - Provide teaching via preferred learning methods  Outcome: Progressing

## 2021-03-17 NOTE — ASSESSMENT & PLAN NOTE
-patient presented to the ER new onset chest pressure with exertion, and decreased exercise tolerance  -en route patient was given 2 nitroglycerin sublingually, 324 mg of aspirin  -in the ER he was started on heparin infusion ACS protocol, nitroglycerin infusion, and loaded with Brilinta  -was evaluated by the cardiology team and underwent urgent cardiac catheterization 3/15 that revealed a 100% lesion in the mid circumflex was treated with drug-eluting stent    Patient is a 90% lesion in the LAD with planned PCI tomorrow  -patient is currently pain-free and hemodynamically stable  -continue aspirin 81 mg daily, Brilinta 90 mg b i d , Lipitor 80 mg daily  -patient not initiated on a beta-blocker due to resting bradycardia  -await catheterization with intervention on LAD lesion tomorrow  -patient's elevated troponin peaked at greater than 40, likely secondary to a type 1 MI, secondary to his underlying coronary artery disease  -2D echocardiogram- LVEF 24%, grade 1 diastolic dysfunction  -Undergoing staged cardiac cath today - staged PCI to LAD

## 2021-03-17 NOTE — PLAN OF CARE
Problem: Potential for Falls  Goal: Patient will remain free of falls  Description: INTERVENTIONS:  - Assess patient frequently for physical needs  -  Identify cognitive and physical deficits and behaviors that affect risk of falls    -  [place] fall precautions as indicated by assessment   - Educate patient/family on patient safety including physical limitations  - Instruct patient to call for assistance with activity based on assessment  - Modify environment to reduce risk of injury  - Consider OT/PT consult to assist with strengthening/mobility  Outcome: Progressing     Problem: Prexisting or High Potential for Compromised Skin Integrity  Goal: Skin integrity is maintained or improved  Description: INTERVENTIONS:  - Identify patients at risk for skin breakdown  - Assess and monitor skin integrity  - Assess and monitor nutrition and hydration status  - Monitor labs   - Assess for incontinence   - Turn and reposition patient  - Assist with mobility/ambulation  - Relieve pressure over bony prominences  - Avoid friction and shearing  - Provide appropriate hygiene as needed including keeping skin clean and dry  - Evaluate need for skin moisturizer/barrier cream  - Collaborate with interdisciplinary team   - Patient/family teaching  - Consider wound care consult   Outcome: Progressing     Problem: PAIN - ADULT  Goal: Verbalizes/displays adequate comfort level or baseline comfort level  Description: Interventions:  - Encourage patient to monitor pain and request assistance  - Assess pain using appropriate pain scale  - Administer analgesics based on type and severity of pain and evaluate response  - Implement non-pharmacological measures as appropriate and evaluate response  - Consider cultural and social influences on pain and pain management  - Notify physician/advanced practitioner if interventions unsuccessful or patient reports new pain  Outcome: Progressing     Problem: INFECTION - ADULT  Goal: Absence or prevention of progression during hospitalization  Description: INTERVENTIONS:  - Assess and monitor for signs and symptoms of infection  - Monitor lab/diagnostic results  - Monitor all insertion sites, i e  indwelling lines, tubes, and drains  - Monitor endotracheal if appropriate and nasal secretions for changes in amount and color  - [place] appropriate cooling/warming therapies per order  - Administer medications as ordered  - Instruct and encourage patient and family to use good hand hygiene technique  - Identify and instruct in appropriate isolation precautions for identified infection/condition  Outcome: Progressing  Goal: Absence of fever/infection during neutropenic period  Description: INTERVENTIONS:  - Monitor WBC    Outcome: Progressing     Problem: SAFETY ADULT  Goal: Patient will remain free of falls  Description: INTERVENTIONS:  - Assess patient frequently for physical needs  -  Identify cognitive and physical deficits and behaviors that affect risk of falls    -  [place] fall precautions as indicated by assessment   - Educate patient/family on patient safety including physical limitations  - Instruct patient to call for assistance with activity based on assessment  - Modify environment to reduce risk of injury  - Consider OT/PT consult to assist with strengthening/mobility  Outcome: Progressing  Goal: Maintain or return to baseline ADL function  Description: INTERVENTIONS:  -  Assess patient's ability to carry out ADLs; assess patient's baseline for ADL function and identify physical deficits which impact ability to perform ADLs (bathing, care of mouth/teeth, toileting, grooming, dressing, etc )  - Assess/evaluate cause of self-care deficits   - Assess range of motion  - Assess patient's mobility; develop plan if impaired  - Assess patient's need for assistive devices and provide as appropriate  - Encourage maximum independence but intervene and supervise when necessary  - Involve family in performance of ADLs  - Assess for home care needs following discharge   - Consider OT consult to assist with ADL evaluation and planning for discharge  - Provide patient education as appropriate  Outcome: Progressing  Goal: Maintain or return mobility status to optimal level  Description: INTERVENTIONS:  - Assess patient's baseline mobility status (ambulation, transfers, stairs, etc )    - Identify cognitive and physical deficits and behaviors that affect mobility  - Identify mobility aids required to assist with transfers and/or ambulation (gait belt, sit-to-stand, lift, walker, cane, etc )  - [place] fall precautions as indicated by assessment  - Record patient progress and toleration of activity level on Mobility SBAR; progress patient to next Phase/Stage  - Instruct patient to call for assistance with activity based on assessment  - Consider rehabilitation consult to assist with strengthening/weightbearing, etc   Outcome: Progressing     Problem: DISCHARGE PLANNING  Goal: Discharge to home or other facility with appropriate resources  Description: INTERVENTIONS:  - Identify barriers to discharge w/patient and caregiver  - Arrange for needed discharge resources and transportation as appropriate  - Identify discharge learning needs (meds, wound care, etc )  - Arrange for interpretive services to assist at discharge as needed  - Refer to Case Management Department for coordinating discharge planning if the patient needs post-hospital services based on physician/advanced practitioner order or complex needs related to functional status, cognitive ability, or social support system  Outcome: Progressing     Problem: Knowledge Deficit  Goal: Patient/family/caregiver demonstrates understanding of disease process, treatment plan, medications, and discharge instructions  Description: Complete learning assessment and assess knowledge base    Interventions:  - Provide teaching at level of understanding  - Provide teaching via preferred learning methods  Outcome: Progressing

## 2021-03-17 NOTE — ASSESSMENT & PLAN NOTE
-patient has a history of chronic impaired fasting glucose  -continue monitor blood sugar  -A1c 6 3 - this is new onset prediabetes

## 2021-03-17 NOTE — PLAN OF CARE
Problem: Potential for Falls  Goal: Patient will remain free of falls  Description: INTERVENTIONS:  - Assess patient frequently for physical needs  -  Identify cognitive and physical deficits and behaviors that affect risk of falls    -  [place] fall precautions as indicated by assessment   - Educate patient/family on patient safety including physical limitations  - Instruct patient to call for assistance with activity based on assessment  - Modify environment to reduce risk of injury  - Consider OT/PT consult to assist with strengthening/mobility  Outcome: Progressing     Problem: Prexisting or High Potential for Compromised Skin Integrity  Goal: Skin integrity is maintained or improved  Description: INTERVENTIONS:  - Identify patients at risk for skin breakdown  - Assess and monitor skin integrity  - Assess and monitor nutrition and hydration status  - Monitor labs   - Assess for incontinence   - Turn and reposition patient  - Assist with mobility/ambulation  - Relieve pressure over bony prominences  - Avoid friction and shearing  - Provide appropriate hygiene as needed including keeping skin clean and dry  - Evaluate need for skin moisturizer/barrier cream  - Collaborate with interdisciplinary team   - Patient/family teaching  - Consider wound care consult   Outcome: Progressing     Problem: PAIN - ADULT  Goal: Verbalizes/displays adequate comfort level or baseline comfort level  Description: Interventions:  - Encourage patient to monitor pain and request assistance  - Assess pain using appropriate pain scale  - Administer analgesics based on type and severity of pain and evaluate response  - Implement non-pharmacological measures as appropriate and evaluate response  - Consider cultural and social influences on pain and pain management  - Notify physician/advanced practitioner if interventions unsuccessful or patient reports new pain  Outcome: Progressing     Problem: INFECTION - ADULT  Goal: Absence or prevention of progression during hospitalization  Description: INTERVENTIONS:  - Assess and monitor for signs and symptoms of infection  - Monitor lab/diagnostic results  - Monitor all insertion sites, i e  indwelling lines, tubes, and drains  - Monitor endotracheal if appropriate and nasal secretions for changes in amount and color  - [place] appropriate cooling/warming therapies per order  - Administer medications as ordered  - Instruct and encourage patient and family to use good hand hygiene technique  - Identify and instruct in appropriate isolation precautions for identified infection/condition  Outcome: Progressing  Goal: Absence of fever/infection during neutropenic period  Description: INTERVENTIONS:  - Monitor WBC    Outcome: Progressing     Problem: SAFETY ADULT  Goal: Patient will remain free of falls  Description: INTERVENTIONS:  - Assess patient frequently for physical needs  -  Identify cognitive and physical deficits and behaviors that affect risk of falls    -  [place] fall precautions as indicated by assessment   - Educate patient/family on patient safety including physical limitations  - Instruct patient to call for assistance with activity based on assessment  - Modify environment to reduce risk of injury  - Consider OT/PT consult to assist with strengthening/mobility  Outcome: Progressing  Goal: Maintain or return to baseline ADL function  Description: INTERVENTIONS:  -  Assess patient's ability to carry out ADLs; assess patient's baseline for ADL function and identify physical deficits which impact ability to perform ADLs (bathing, care of mouth/teeth, toileting, grooming, dressing, etc )  - Assess/evaluate cause of self-care deficits   - Assess range of motion  - Assess patient's mobility; develop plan if impaired  - Assess patient's need for assistive devices and provide as appropriate  - Encourage maximum independence but intervene and supervise when necessary  - Involve family in performance of ADLs  - Assess for home care needs following discharge   - Consider OT consult to assist with ADL evaluation and planning for discharge  - Provide patient education as appropriate  Outcome: Progressing  Goal: Maintain or return mobility status to optimal level  Description: INTERVENTIONS:  - Assess patient's baseline mobility status (ambulation, transfers, stairs, etc )    - Identify cognitive and physical deficits and behaviors that affect mobility  - Identify mobility aids required to assist with transfers and/or ambulation (gait belt, sit-to-stand, lift, walker, cane, etc )  - [place] fall precautions as indicated by assessment  - Record patient progress and toleration of activity level on Mobility SBAR; progress patient to next Phase/Stage  - Instruct patient to call for assistance with activity based on assessment  - Consider rehabilitation consult to assist with strengthening/weightbearing, etc   Outcome: Progressing     Problem: DISCHARGE PLANNING  Goal: Discharge to home or other facility with appropriate resources  Description: INTERVENTIONS:  - Identify barriers to discharge w/patient and caregiver  - Arrange for needed discharge resources and transportation as appropriate  - Identify discharge learning needs (meds, wound care, etc )  - Arrange for interpretive services to assist at discharge as needed  - Refer to Case Management Department for coordinating discharge planning if the patient needs post-hospital services based on physician/advanced practitioner order or complex needs related to functional status, cognitive ability, or social support system  Outcome: Progressing     Problem: Knowledge Deficit  Goal: Patient/family/caregiver demonstrates understanding of disease process, treatment plan, medications, and discharge instructions  Description: Complete learning assessment and assess knowledge base    Interventions:  - Provide teaching at level of understanding  - Provide teaching via preferred learning methods  Outcome: Progressing

## 2021-03-17 NOTE — CASE MANAGEMENT
LOS: 2 DAYS  BUNDLE: NO   READMISSION: NO  READMISSION RISK SCORE: 8     Cm met pt at bedside to discuss cm role and dcp needs  Cm obtained the following information from the patient  HOME: 2 SH with 10 edita  LIVES WITH: Wife  ADLS: Independent with all ADL's  DME: None  SNF: Reports hx five years ago s/p fall  VNA/HHC: reports no history  MH/DRUG&ETOH HX: Denies   PCP: Matthieu Brown  EMPLOYMENT STATUS: Retired  PHARMACY: [employer]  POA/LW: No  Cm offered application  Pt refuses  DRIVES: Yes  TRANSPORT AT D/C: Wife will transport at d/c     CM reviewed d/c planning process including the following: identifying help at home, patient preference for d/c planning needs, Discharge Lounge, [facility] Meds to Bed program, availability of treatment team to discuss questions or concerns patient and/or family may have regarding understanding medications and recognizing signs and symptoms once discharged  CM also encouraged patient to follow up with all recommended appointments after discharge  Patient advised of importance for patient and family to participate in managing patients medical well being        Cm department will continue to follow through discharge

## 2021-03-17 NOTE — PROGRESS NOTES
Progress Note - Cardiology   Isaias Cook 78 y o  male MRN: [id number]  Unit/Bed#: E4 -01 Encounter: [id number]      Assessment/Recommendations/Discussion:   1  Acute myocardial infarction with left circumflex thrombosis, severe LAD disease  2  Dyslipidemia  3  Impaired fasting glucose       PLAN   Doing well today  No acute events overnight   Continue aspirin, Brilinta, Lipitor [de-identified]   Plan for staged PCI to LAD today   Echo showed preserved ejection fraction, EF 43%, grade 1 diastolic dysfunction        Subjective:   HPI  Doing well  Notes no acute events overnight, does not sleep well since being in the hospital, denies chest pain or shortness of breath  Review of Systems: As noted in HPI  Rest of ROS is negative  Vitals:   /57 (BP Location: Left arm)   Pulse 62   Temp 97 7 °F (36 5 °C) (Temporal)   Resp 20   Ht 5' 11" (1 803 m)   Wt 92 1 kg (203 lb 0 7 oz)   SpO2 96%   BMI 28 32 kg/m²   Vitals:    03/16/21 0200 03/17/21 0600   Weight: 92 6 kg (204 lb 2 3 oz) 92 1 kg (203 lb 0 7 oz)       Intake/Output Summary (Last 24 hours) at 3/17/2021 1003  Last data filed at 3/16/2021 2219  Gross per 24 hour   Intake 370 ml   Output 425 ml   Net -55 ml       Physical Exam:  General appearance: alert and oriented, in no acute distress  Head: Normocephalic, without obvious abnormality, atraumatic  Eyes: conjunctivae/corneas clear  Anicteric    Neck: no adenopathy, no carotid bruit, no JVD  Lungs: clear to auscultation bilaterally  Heart: regular rate and rhythm, S1, S2 normal, no murmur, no click, rub or gallop  Abdomen:  soft, non-tender; bowel sounds normal; no masses,  no organomegaly  Extremities: extremities normal, warm and well-perfused; no cyanosis, clubbing, or edema  Skin: Skin color, texture, turgor normal  No rashes or lesions     TELEMETRY:   No arrhythmias    Lab Results:  Results from last 7 days   Lab Units 03/17/21  0600   WBC Thousand/uL 7 30   HEMOGLOBIN g/dL 13 6 HEMATOCRIT % 41 1   PLATELETS Thousands/uL 188     Results from last 7 days   Lab Units 03/17/21  0600  03/15/21  1028   POTASSIUM mmol/L 3 9   < > 3 9   CHLORIDE mmol/L 108   < > 106   CO2 mmol/L 25   < > 26   BUN mg/dL 19   < > 26*   CREATININE mg/dL 0 96   < > 0 91   CALCIUM mg/dL 8 7   < > 9 0   ALK PHOS U/L  --   --  107   ALT U/L  --   --  29   AST U/L  --   --  26    < > = values in this interval not displayed  Results from last 7 days   Lab Units 03/17/21  0600   POTASSIUM mmol/L 3 9   CHLORIDE mmol/L 108   CO2 mmol/L 25   BUN mg/dL 19   CREATININE mg/dL 0 96   CALCIUM mg/dL 8 7           Medications:    Current Facility-Administered Medications:     acetaminophen (TYLENOL) tablet 650 mg, 650 mg, Oral, Q4H PRN, Talha Castle MD    aspirin (ECOTRIN LOW STRENGTH) EC tablet 81 mg, 81 mg, Oral, Daily, Talha Castle MD, 81 mg at 03/17/21 0805    atorvastatin (LIPITOR) tablet 80 mg, 80 mg, Oral, Daily With Marilee Post MD, 80 mg at 03/16/21 1630    docusate sodium (COLACE) capsule 100 mg, 100 mg, Oral, BID, Talha Castle MD, 100 mg at 03/17/21 0805    enoxaparin (LOVENOX) subcutaneous injection 40 mg, 40 mg, Subcutaneous, Q24H [address], Talha Castle MD, 40 mg at 03/17/21 0805    ondansetron (ZOFRAN) injection 4 mg, 4 mg, Intravenous, Q6H PRN, Talha Castle MD    pantoprazole (PROTONIX) EC tablet 40 mg, 40 mg, Oral, Early Morning, Talha Castle MD, 40 mg at 03/16/21 1630    ticagrelor (BRILINTA) tablet 90 mg, 90 mg, Oral, BID, Talha Castle MD, 90 mg at 03/17/21 0805    This note was completed in part utilizing M-Modal Fluency Direct Software  Grammatical errors, random word insertions, spelling mistakes, and incomplete sentences may be an occasional consequence of this system secondary to software limitations, ambient noise, and hardware issues    If you have any questions or concerns about the content, text, or information contained within the body of this dictation, please contact the provider for clarification      Samara Petit DO, [facility]  3/17/2021 10:03 AM

## 2021-03-17 NOTE — PROGRESS NOTES
[place]ia Santana  Progress Note - Mac Mallory 1950, 78 y o  male MRN: [id number]  Unit/Bed#: E4 -01 Encounter: [id number]  Primary Care Provider: Anna Vega MD   Date and time admitted to hospital: 3/15/2021 10:08 AM      * Acute coronary syndrome [facility])  Assessment & Plan  -patient presented to the ER new onset chest pressure with exertion, and decreased exercise tolerance  -en route patient was given 2 nitroglycerin sublingually, 324 mg of aspirin  -in the ER he was started on heparin infusion ACS protocol, nitroglycerin infusion, and loaded with Brilinta  -was evaluated by the cardiology team and underwent urgent cardiac catheterization 3/15 that revealed a 100% lesion in the mid circumflex was treated with drug-eluting stent  Patient is a 90% lesion in the LAD with planned PCI tomorrow  -patient is currently pain-free and hemodynamically stable  -continue aspirin 81 mg daily, Brilinta 90 mg b i d , Lipitor 80 mg daily  -patient not initiated on a beta-blocker due to resting bradycardia  -await catheterization with intervention on LAD lesion tomorrow  -patient's elevated troponin peaked at greater than 40, likely secondary to a type 1 MI, secondary to his underlying coronary artery disease  -2D echocardiogram- LVEF 22%, grade 1 diastolic dysfunction  -Undergoing staged cardiac cath today - staged PCI to LAD    Hyperlipidemia  Assessment & Plan  -continue statin    IFG (impaired fasting glucose)  Assessment & Plan  -patient has a history of chronic impaired fasting glucose  -continue monitor blood sugar  -A1c 6 3 - this is new onset prediabetes      VTE Pharmacologic Prophylaxis:   Pharmacologic: Enoxaparin (Lovenox)  Mechanical VTE Prophylaxis in Place: Yes    Discharge Plan:  With need for continued cardiac workup cath today    Discussions with Specialists or Other Care Team Provider: nursing    Education and Discussions with Family / Patient: patient    Time Spent for Care: 20 minutes  More than 50% of total time spent on counseling and coordination of care as described above  Current Length of Stay: 2 day(s)  Current Patient Status: Inpatient   Code Status: Level 1 - Full Code    Subjective:   Pt for cath today  Doing well without any acute events overnight  Objective:     Vitals:   Temp (24hrs), Av °F (36 7 °C), Min:97 7 °F (36 5 °C), Max:98 2 °F (36 8 °C)    Temp:  [97 7 °F (36 5 °C)-98 2 °F (36 8 °C)] 97 7 °F (36 5 °C)  HR:  [55-70] 62  Resp:  [18-20] 20  BP: (108-146)/(53-70) 128/64  SpO2:  [96 %] 96 %  Body mass index is 28 32 kg/m²  Input and Output Summary (last 24 hours): Intake/Output Summary (Last 24 hours) at 3/17/2021 1601  Last data filed at 3/17/2021 1400  Gross per 24 hour   Intake 130 ml   Output 400 ml   Net -270 ml       Physical Exam:     Physical Exam  Vitals signs and nursing note reviewed  Constitutional:       General: He is not in acute distress  Appearance: Normal appearance  He is normal weight  He is not ill-appearing or toxic-appearing  HENT:      Head: Normocephalic and atraumatic  Eyes:      General: No scleral icterus  Cardiovascular:      Rate and Rhythm: Normal rate and regular rhythm  Heart sounds: Murmur present  Pulmonary:      Effort: Pulmonary effort is normal  No respiratory distress  Breath sounds: Normal breath sounds  No stridor  Abdominal:      General: Bowel sounds are normal  There is no distension  Palpations: Abdomen is soft  There is no mass  Skin:     General: Skin is warm and dry  Neurological:      Mental Status: He is oriented to person, place, and time  Mental status is at baseline     Psychiatric:         Mood and Affect: Mood normal          Behavior: Behavior normal          Additional Data:     Labs:    Results from last 7 days   Lab Units 21  0600   WBC Thousand/uL 7 30   HEMOGLOBIN g/dL 13 6   HEMATOCRIT % 41 1   PLATELETS Thousands/uL 188   NEUTROS PCT % 62 LYMPHS PCT % 22   MONOS PCT % 13*   EOS PCT % 2     Results from last 7 days   Lab Units 03/17/21  0600  03/15/21  1028   POTASSIUM mmol/L 3 9   < > 3 9   CHLORIDE mmol/L 108   < > 106   CO2 mmol/L 25   < > 26   BUN mg/dL 19   < > 26*   CREATININE mg/dL 0 96   < > 0 91   CALCIUM mg/dL 8 7   < > 9 0   ALK PHOS U/L  --   --  107   ALT U/L  --   --  29   AST U/L  --   --  26    < > = values in this interval not displayed  Results from last 7 days   Lab Units 03/15/21  1028   INR  1 09       * I Have Reviewed All Lab Data Listed Above  * Additional Pertinent Lab Tests Reviewed: [facility] 68 Admission Reviewed    Imaging:    Imaging Reports Reviewed Today Include:   Imaging Personally Reviewed by Myself Includes:      Recent Cultures (last 7 days):           Last 24 Hours Medication List:   Current Facility-Administered Medications   Medication Dose Route Frequency Provider Last Rate    acetaminophen  650 mg Oral Q4H PRN Dillon Neves MD      aspirin  81 mg Oral Daily Dillon Neves MD      atorvastatin  80 mg Oral Daily With Edith Claros MD      docusate sodium  100 mg Oral BID Dillon Neves MD      enoxaparin  40 mg Subcutaneous Q24H Jennifer Bennett MD      ondansetron  4 mg Intravenous Q6H PRN Dillon Neves MD      pantoprazole  40 mg Oral Early Morning Dillon Neves MD      ticagrelor  90 mg Oral BID Dillon Neves MD          Today, Patient Was Seen By: Jenn Melgar PA-C    ** Please Note: This note has been constructed using a voice recognition system   **

## 2021-03-18 VITALS
WEIGHT: 203.04 LBS | BODY MASS INDEX: 28.43 KG/M2 | DIASTOLIC BLOOD PRESSURE: 74 MMHG | HEIGHT: 71 IN | OXYGEN SATURATION: 100 % | HEART RATE: 60 BPM | SYSTOLIC BLOOD PRESSURE: 130 MMHG | TEMPERATURE: 97.2 F | RESPIRATION RATE: 18 BRPM

## 2021-03-18 LAB
ANION GAP SERPL CALCULATED.3IONS-SCNC: 7 MMOL/L (ref 4–13)
BUN SERPL-MCNC: 16 MG/DL (ref 5–25)
CALCIUM SERPL-MCNC: 9.1 MG/DL (ref 8.3–10.1)
CHLORIDE SERPL-SCNC: 107 MMOL/L (ref 100–108)
CO2 SERPL-SCNC: 26 MMOL/L (ref 21–32)
CREAT SERPL-MCNC: 0.9 MG/DL (ref 0.6–1.3)
GFR SERPL CREATININE-BSD FRML MDRD: 87 ML/MIN/1.73SQ M
GLUCOSE SERPL-MCNC: 130 MG/DL (ref 65–140)
POTASSIUM SERPL-SCNC: 4.2 MMOL/L (ref 3.5–5.3)
SODIUM SERPL-SCNC: 140 MMOL/L (ref 136–145)

## 2021-03-18 PROCEDURE — 99239 HOSP IP/OBS DSCHRG MGMT >30: CPT | Performed by: PHYSICIAN ASSISTANT

## 2021-03-18 PROCEDURE — 80048 BASIC METABOLIC PNL TOTAL CA: CPT | Performed by: PHYSICIAN ASSISTANT

## 2021-03-18 PROCEDURE — 99232 SBSQ HOSP IP/OBS MODERATE 35: CPT | Performed by: INTERNAL MEDICINE

## 2021-03-18 RX ORDER — ATORVASTATIN CALCIUM 80 MG/1
80 TABLET, FILM COATED ORAL
Qty: 30 TABLET | Refills: 0 | Status: SHIPPED | OUTPATIENT
Start: 2021-03-18 | End: 2021-04-07 | Stop reason: SDUPTHER

## 2021-03-18 RX ORDER — ASPIRIN 81 MG/1
81 TABLET ORAL DAILY
Qty: 30 TABLET | Refills: 0 | Status: SHIPPED | OUTPATIENT
Start: 2021-03-19

## 2021-03-18 RX ADMIN — DOCUSATE SODIUM 100 MG: 100 CAPSULE, LIQUID FILLED ORAL at 10:58

## 2021-03-18 RX ADMIN — PANTOPRAZOLE SODIUM 40 MG: 40 TABLET, DELAYED RELEASE ORAL at 05:42

## 2021-03-18 RX ADMIN — ENOXAPARIN SODIUM 40 MG: 40 INJECTION SUBCUTANEOUS at 10:59

## 2021-03-18 RX ADMIN — ASPIRIN 81 MG: 81 TABLET ORAL at 10:59

## 2021-03-18 RX ADMIN — TICAGRELOR 90 MG: 90 TABLET ORAL at 10:59

## 2021-03-18 NOTE — ASSESSMENT & PLAN NOTE
-patient has a history of chronic impaired fasting glucose  -continue monitor blood sugar  -A1c 6 3 - this is new onset prediabetes  -pt has opted to treat this with diet and exercise right now  -will follow up with PCP in regards to further management

## 2021-03-18 NOTE — DISCHARGE SUMMARY
[place] 50  Discharge- Soco Bush 1950, 78 y o  male MRN: [id number]  Unit/Bed#: E4 -01 Encounter: [id number]  Primary Care Provider: Emeli Mcfarlane MD   Date and time admitted to hospital: 3/15/2021 10:08 AM        Discharge date:  3/18/21    * Acute coronary syndrome [facility])  Assessment & Plan  -patient presented to the ER new onset chest pressure with exertion, and decreased exercise tolerance  -en route patient was given 2 nitroglycerin sublingually, 324 mg of aspirin  -in the ER he was started on heparin infusion ACS protocol, nitroglycerin infusion, and loaded with Brilinta  -was evaluated by the cardiology team and underwent urgent cardiac catheterization 3/15 that revealed a 100% lesion in the mid circumflex was treated with drug-eluting stent    Patient had a 90% lesion in the LAD with planned PCI the next day  -patient is currently pain-free and hemodynamically stable  -troponin peaked greater than 40- likely secondary to type 1 MI, secondary to his underlying CAD  -started on aspirin 81 mg daily, Brilinta 90 mg b i d , Lipitor 80 mg daily- NEW meds  -patient not initiated on a beta-blocker due to resting bradycardia  -underwent repeat cath on 3/17 to address lesions in LAD   - mid LAD had 85% occlusions - angioplasty and EMILIA placed   - 2nd diagonal had 100% occlusion - angioplasty and EMILIA placed  -2D echocardiogram- LVEF 26%, grade 1 diastolic dysfunction  -meds above eprescribed, will need cardiac rehab, and followup in cardiology office with Dr Chris Smith    Hyperlipidemia  [address] [place] [address]  -continue statin but at increased dosage    IFG (impaired fasting glucose)  Assessment & Plan  -patient has a history of chronic impaired fasting glucose  -continue monitor blood sugar  -A1c 6 3 - this is new onset prediabetes  -pt has opted to treat this with diet and exercise right now  -will follow up with PCP in regards to further management        Consultations During Hospital Stay:  ·  cardiology    Procedures Performed:   · 3/15/21: Chest x-ray:  No acute cardiopulmonary disease  Findings stable    · 3/16/21: Echocardiogram:  LVEF 29%, grade 1 diastolic dysfunction, wall thickness mildly increased  · Cardiac catheterization 3/15/21    CORONARY CIRCULATION:  There was 2-vessel coronary artery disease  Mid LAD: There was a 100 % stenosis  1st diagonal: There was a 50 % stenosis at the ostium of the vessel segment  Ostial circumflex: There was a 55 % stenosis  Mid circumflex: There was a 100 % stenosis  1ST LESION INTERVENTIONS:  A balloon angioplasty procedure was performed on the lesion in the mid circumflex  A Resolute Edgar Rx 2 5 x 22mm drug-eluting stent was placed across the lesion and deployed at a maximum inflation pressure of 12 joycelyn        · Cardiac catheterization 3/17/21  CORONARY CIRCULATION:  Mid LAD: There was a 85 % stenosis  1ST LESION INTERVENTIONS:  A successful balloon angioplasty with stent and balloon angioplasty procedure was performed on the 85 % lesion in the mid LAD  Following intervention there was a 0 % residual stenosis  A drug-eluting stent was placed across the lesion and deployed at a maximum inflation pressure of 14 joycelyn  2ND LESION INTERVENTIONS:  A balloon angioplasty procedure was performed on the 100 % lesion post stenting from plaque shift in the 2nd diagonal  Following intervention there was a 0 % residual stenosis after 2mm balloon inflation  Significant Findings / Test Results:   · Coronary artery disease requiring angioplasty and stent placement  · New medications started:   Aspirin 81 mg daily  Brilinta 90 mg b i d  [employer] Sabina Atorvastatin 20 mg -- 80 mg daily       No Beta-blocker secondary to bradycardia    Incidental Findings:   ·  none    Test Results Pending at Discharge (will require follow up):   ·  none     Outpatient follow-up Requested:  ·  cardiology-Dr Sylvain Calix - call to make an appointment    Complications: none    Hospital Course:     Soco Bush is a 78 y o  male patient who originally presented to the hospital on 3/15/2021 due to exhaustion with physical activity, chest pain and dyspnea while riding his bike  Patient notified his wife and was brought to an urgent care  They further recommended going to the ER  For further workup of symptoms  Here patient was noted to have slight inferior ST-elevation with lateral ST depressions as well as an initial elevated troponin at 0 48  Patient was started on heparin drip, nitroglycerin drip, loaded with Brilinta  He was seen in consultation by Cardiology and taken for urgent cardiac catheterization on 3/15/21  He was found to have coronary artery disease and it was deemed patient would be appropriate for 2 step staged cardiac catheterization  Initial lesions found and he underwent angioplasty and EMILIA  Patient was taken back for an additional cardiac catheterization on 3/17/21 for angioplasty to LAD lesions and  Drug-eluting stent placement  Patient's medication regimen was changed  Patient was started on aspirin 81 mg daily, Brilinta 90 mg b i d , advancement of his atorvastatin dosage from 20 mg daily to 80 mg daily  A beta-blocker was not prescribed given patient's underlying resting bradycardia  Patient's symptoms have resolved and he feels good today  In conclusion, patient is stable for discharge at this time  He will follow-up with cardiology in the office-Dr Chris Smith  Instructed on importance of compliance with medication  Patient may not drive for the next week  He also plans on attending cardiac rehab  Condition at Discharge: stable     Discharge Day Visit / Exam:     Subjective:   Patient resting in chair comfortably  He reports no further episodes of chest pain  He feels good today  Denies any other issues  Discussed medication changes in important with compliance  He understands need to follow-up with cardiologist in the office  Vitals: Blood Pressure: 132/68 (03/18/21 0826)  Pulse: 69 (03/18/21 0826)  Temperature: (!) 97 4 °F (36 3 °C) (03/18/21 0826)  Temp Source: Temporal (03/18/21 0826)  Respirations: 16 (03/18/21 0826)  Height: 5' 11" (180 3 cm) (03/16/21 0200)  Weight - Scale: 92 1 kg (203 lb 0 7 oz) (03/17/21 0600)  SpO2: 96 % (03/18/21 0826)     Exam:   Physical Exam  Vitals signs and nursing note reviewed  Constitutional:       General: He is not in acute distress  Appearance: Normal appearance  He is normal weight  He is not ill-appearing, toxic-appearing or diaphoretic  HENT:      Head: Normocephalic and atraumatic  Eyes:      General: No scleral icterus  Cardiovascular:      Rate and Rhythm: Normal rate and regular rhythm  Pulmonary:      Effort: Pulmonary effort is normal  No respiratory distress  Breath sounds: Normal breath sounds  No stridor  No wheezing or rhonchi  Abdominal:      General: Bowel sounds are normal  There is no distension  Palpations: Abdomen is soft  There is no mass  Tenderness: There is no abdominal tenderness  Hernia: No hernia is present  Musculoskeletal:         General: No swelling  Skin:     General: Skin is warm and dry  Neurological:      Mental Status: He is oriented to person, place, and time  Mental status is at baseline  Psychiatric:         Behavior: Behavior normal          Discussion with Family:  Offered to update family-patient declined    Discharge instructions/Information to patient and family:   See after visit summary for information provided to patient and family  Provisions for Follow-Up Care:  See after visit summary for information related to follow-up care and any pertinent home health orders  Planned Readmission: no     Discharge Statement:  I spent 55 minutes discharging the patient  This time was spent on the day of discharge  I had direct contact with the patient on the day of discharge   Greater than 50% of the total time was spent examining patient, answering all patient questions, arranging and discussing plan of care with patient as well as directly providing post-discharge instructions  Additional time then spent on discharge activities  Discharge Medications:  See after visit summary for reconciled discharge medications provided to patient and family        ** Please Note: This note has been constructed using a voice recognition system **

## 2021-03-18 NOTE — ASSESSMENT & PLAN NOTE
-patient presented to the ER new onset chest pressure with exertion, and decreased exercise tolerance  -en route patient was given 2 nitroglycerin sublingually, 324 mg of aspirin  -in the ER he was started on heparin infusion ACS protocol, nitroglycerin infusion, and loaded with Brilinta  -was evaluated by the cardiology team and underwent urgent cardiac catheterization 3/15 that revealed a 100% lesion in the mid circumflex was treated with drug-eluting stent    Patient had a 90% lesion in the LAD with planned PCI the next day  -patient is currently pain-free and hemodynamically stable  -troponin peaked greater than 40- likely secondary to type 1 MI, secondary to his underlying CAD  -started on aspirin 81 mg daily, Brilinta 90 mg b i d , Lipitor 80 mg daily- NEW meds  -patient not initiated on a beta-blocker due to resting bradycardia  -underwent repeat cath on 3/17 to address lesions in LAD   - mid LAD had 85% occlusions - angioplasty and EMILIA placed   - 2nd diagonal had 100% occlusion - angioplasty and EMILIA placed  -2D echocardiogram- LVEF 73%, grade 1 diastolic dysfunction  -meds above eprescribed, will need cardiac rehab, and followup in cardiology office with Dr Haven Reed

## 2021-03-18 NOTE — PROGRESS NOTES
Progress Note - Cardiology   Claude Magallon 78 y o  male MRN: [id number]  Unit/Bed#: E4 -01 Encounter: [id number]    Assessment:  1  Myocardial infarction left circumflex thrombosis and significant LAD disease   2  Postop staged EMILIA stent placement in left circumflex and LAD with good results  3  Hyperlipidemia with excellent cholesterol values on atorvastatin 80 mg daily  4  Preserved LVEF with grade 1 diastolic dysfunction     Plan:    1  Patient may be discharged today  2  Continue aspirin, Brilinta and Lipitor 80 mg   3  No driving for 1 week  4  Arrange appointment for patient to see Dr Kyara Easton in follow      Interval history:  Patient underwent stent placement in the LAD yesterday  Procedure went very well and he is feeling well  Vital signs are stable and patient has no complaints  Vitals: /68 (BP Location: Left arm)   Pulse 69   Temp (!) 97 4 °F (36 3 °C) (Temporal)   Resp 16   Ht 5' 11" (1 803 m)   Wt 92 1 kg (203 lb 0 7 oz)   SpO2 96%   BMI 28 32 kg/m²   Vitals:    03/16/21 0200 03/17/21 0600   Weight: 92 6 kg (204 lb 2 3 oz) 92 1 kg (203 lb 0 7 oz)     Orthostatic Blood Pressures      Most Recent Value   Blood Pressure  132/68 filed at 03/18/2021 0615   Patient Position - Orthostatic VS  Lying filed at 03/18/2021 0669            Intake/Output Summary (Last 24 hours) at 3/18/2021 1124  Last data filed at 3/17/2021 1400  Gross per 24 hour   Intake --   Output 200 ml   Net -200 ml       Invasive Devices     Peripheral Intravenous Line            Peripheral IV 03/15/21 Left Hand 3 days    Peripheral IV 03/15/21 Right Hand 3 days                Review of Systems   Constitutional: Negative for activity change  Respiratory: Negative for cough, chest tightness, shortness of breath and wheezing  Cardiovascular: Negative for chest pain, palpitations and leg swelling  Musculoskeletal: Negative for gait problem  Skin: Negative for color change     Neurological: Negative for dizziness, tremors, syncope, weakness, light-headedness and headaches  Psychiatric/Behavioral: Negative for agitation and confusion  Physical Exam  Constitutional:       General: He is not in acute distress  Appearance: He is well-developed  HENT:      Head: Normocephalic and atraumatic  Neck:      Vascular: No carotid bruit or JVD  Cardiovascular:      Rate and Rhythm: Normal rate and regular rhythm  Heart sounds: Normal heart sounds  No murmur  No friction rub  No gallop  Pulmonary:      Effort: Pulmonary effort is normal  No respiratory distress  Breath sounds: Normal breath sounds  No wheezing, rhonchi or rales  Chest:      Chest wall: No tenderness  Abdominal:      General: Bowel sounds are normal  There is no distension  Palpations: Abdomen is soft  Musculoskeletal:      Right lower leg: No edema  Left lower leg: No edema  Skin:     General: Skin is warm and dry  Neurological:      General: No focal deficit present  Mental Status: He is alert and oriented to person, place, and time  Psychiatric:         Mood and Affect: Mood normal          Behavior: Behavior normal          Thought Content: Thought content normal          Judgment: Judgment normal          --------------------------------------------------------------------------------  CATH:    Results for orders placed during the hospital encounter of 03/15/21   Cardiac catheterization    Narrative [facility] [address], [address] [facility]  [phone]    [facility]    Invasive Cardiovascular Lab Complete Report    Patient: Ashley Bell  MR number: [id number]  Account number: [de-identified]  Study date: 2021  Gender: Male  : 1950  Height: 70 9 in  Weight: 202 6 lb  BSA: 2 12 mï¾²    Allergies: NO KNOWN ALLERGIES    Interventional Cardiologist:  Maura Maradiaga DO  Primary Physician:  Gadiel Green MD    SUMMARY    CORONARY CIRCULATION:  Mid LAD: There was a 85 % stenosis  1ST LESION INTERVENTIONS:  A successful balloon angioplasty with stent and balloon angioplasty procedure was performed on the 85 % lesion in the mid LAD  Following intervention there was a 0 % residual stenosis  A drug-eluting stent was placed across the lesion and deployed at a maximum inflation pressure of 14 joycelyn  2ND LESION INTERVENTIONS:  A balloon angioplasty procedure was performed on the 100 % lesion post stenting from plaque shift in the 2nd diagonal  Following intervention there was a 0 % residual stenosis after 2mm balloon inflation  INDICATIONS:  --  Possible CAD: unstable angina returns to lab for lad pci    PROCEDURES PERFORMED    --  Left coronary angiography  --  Inpatient  --  Mod Sedation Same Physician Initial 15min  --  Mod Sedation Same Physician Add 15min  --  Coronary Drug Eluting Stent w/PTCA  --  PTCA, Additional Branch of Coronary Artery  --  Intervention on mid LAD: balloon angioplasty, stent, balloon angioplasty  --  Intervention on D2: balloon angioplasty  PROCEDURE: The risks and alternatives of the procedures and conscious sedation were explained to the patient and informed consent was obtained  The patient was brought to the cath lab and placed on the table  The planned puncture sites  were prepped and draped in the usual sterile fashion  --  Right radial artery access  After performing an Paul's test to verify adequate ulnar artery supply to the hand, the radial site was prepped  The puncture site was infiltrated with local anesthetic  The vessel was accessed using the  modified Seldinger technique, a wire was advanced into the vessel, and a sheath was advanced over the wire into the vessel  --  Left coronary artery angiography  A catheter was advanced over a guidewire into the aorta and positioned in the left coronary artery ostium under fluoroscopic guidance  Angiography was performed      -- Inpatient  --  Mod Sedation Same Physician Initial 15min  --  Mod Sedation Same Physician Add 15min  LESION #1 INTERVENTION: A successful balloon angioplasty with stent and balloon angioplasty procedure was performed on the 85 % lesion in the mid LAD  Following intervention there was a 0 % residual stenosis  There was LATRICE 3 flow before  the procedure and LATRICE 3 flow after the procedure  There was no dissection  --  Vessel setup was performed  A BMW   014 190cm wire was used to cross the lesion  --  Vessel setup was performed  A Launcher 6Fr Jl 3 5 guiding catheter was used to cannulate the vessel  --  Balloon angioplasty was performed, using a Trek Rx 2 5 x 15mm balloon, with 3 inflations and a maximum inflation pressure of 16 joycelyn  --  A drug-eluting stent was placed across the lesion and deployed at a maximum inflation pressure of 14 joycelyn  --  Balloon angioplasty was performed, using a NC Euphora Rx 2 75 x 15mm balloon, with 1 inflations and a maximum inflation pressure of 22 joycelyn  --  Balloon angioplasty was performed, using a NC Trek Rx 3 0 x 15mm balloon, with 2 inflations and a maximum inflation pressure of 16 joycelyn  LESION #2 INTERVENTION: A balloon angioplasty procedure was performed on the 100 % lesion post stenting from plaque shift in the 2nd diagonal  Following intervention there was a 0 % residual stenosis after 2mm balloon inflation  There was  LATRICE 3 flow before the procedure and LATRICE 3 flow after the procedure  There was no dissection  --  Vessel setup was performed  A  50 Str 190cm wire was used to cross the lesion  --  Vessel setup was performed  A Launcher 6Fr Jl 3 5 guiding catheter was used to cannulate the vessel  --  Balloon angioplasty was performed, using a Mini Trek Rx 2 0 x 12mm balloon, with 1 inflations and a maximum inflation pressure of 8 joycelyn  INTERVENTIONS:  --  Coronary Drug Eluting Stent w/PTCA      --  PTCA, Additional Branch of Coronary Artery  PROCEDURE COMPLETION: The patient tolerated the procedure well and was discharged from the cath lab  TIMING: Test started at 10:41  Test concluded at 11:17  HEMOSTASIS: The sheath was removed  The site was compressed with a Hemoband  device  Hemostasis was obtained  MEDICATIONS GIVEN: Fentanyl (1OOmcg/2 ml), 50 mcg, IV, at 10:39  Versed (2mg/2ml), 2 mg, IV, at 10:39  1% Lidocaine, 2 ml, subcutaneously, at 10:41  Nitroglycerin (200mcg/ml), 200 mcg, at 10:45  Verapamil  (5mg/2ml), 2 5 mg, IV, at 10:45  Heparin 1000 units/ml, 4,000 units, at 10:45  Heparin 1000 units/ml, 6,000 units, IV, at 10:47  Versed (2mg/2ml), 2 mg, IV, at 10:53  Fentanyl (1OOmcg/2 ml), 50 mcg, IV, at 10:53  Nitroglycerine  (200mcg/ml), 200 mcg, at 11:01  CONTRAST GIVEN: 100 ml Omnipaque (350mg I /ml)  RADIATION EXPOSURE: Fluoroscopy time: 12 42 min  CORONARY VESSELS:   --  Left main: Angiography showed minor luminal irregularities  --  Mid LAD: There was a 85 % stenosis  --  2nd diagonal:    IMPRESSIONS:  PCI of mid LAD with EMILIA  Patent LCX stent  RECOMMENDATIONS  dapt x 1 year, asa indefinitely, gdmt cad    Prepared and signed by    Elena Britt DO  Signed 03/17/2021 12:19:02    Study diagram    Angiographic findings  Native coronary lesions:  ï¾·Mid LAD: Lesion 1: 85 % stenosis  Intervention results  Native coronary lesions:  ï¾·Successful balloon angioplasty, stent, and balloon angioplasty of the 85 % stenosis in mid LAD  0 % residual stenosis  Stent: drug-eluting  ï¾·balloon angioplasty of the 100 % stenosis in D2  0 % residual stenosis      Hemodynamic tables    Pressures:  Baseline  Pressures:  - HR: 73  Pressures:  - Rhythm:  Pressures:  -- Aortic Pressure (S/D/M): --/--/46    Outputs:  Baseline  Outputs:  -- CALCULATIONS: [age]  Outputs:  -- CALCULATIONS: Body Surface Area: 2 12  Outputs:  -- CALCULATIONS: Height in cm: 180 00  Outputs:  -- CALCULATIONS: Sex: Male  Outputs:  -- CALCULATIONS: Weight in k 10       --------------------------------------------------------------------------------  ECHO:   Results for orders placed during the hospital encounter of 03/15/21   Echo complete with contrast if indicated    [address]  [facility] [address]  [place], [address]  [phone]    Transthoracic Echocardiogram  2D, M-mode, Doppler, and Color Doppler    Study date:  16-Mar-2021    Patient: Louise Soni  MR number: [id number]  Account number: [de-identified]  : 1950  Age: 78 years  Gender: Male  Status: Inpatient  Location: Bedside  Height: 71 in  Weight: 202 6 lb  BP: 142/ 63 mmHg    Indications: Chest pain  MI  Diagnoses: R07 9 - Chest pain, unspecified    Sonographer:  AVELINA Aaron  Primary Physician:  Anna Vega MD  Referring Physician:  Altamease Severs, PA-C  Group:  [id number]  Interpreting Physician:  JUANITA Solares     SUMMARY    LEFT VENTRICLE:  Systolic function was normal by visual assessment  Ejection fraction was estimated to be 60 %  There were no regional wall motion abnormalities  Wall thickness was mildly increased  Mass was normal  The changes were consistent with concentric remodeling (increased wall thickness with normal wall mass)  Doppler parameters were consistent with abnormal left ventricular relaxation (grade 1 diastolic dysfunction)  PULMONIC VALVE:  There was trace regurgitation  AORTA:  There was mild dilatation of the ascending aorta  SUMMARY MEASUREMENTS  2D measurements:  Unspecified Anatomy:   %FS was 31 8 %  Ao Diam was 3 4 cm  Ao asc was 3 1 cm   EDV(Teich) was 75 8 ml   EF(Teich) was 60 2 %  ESV(Teich) was 30 2 ml  IVSd was 1 2 cm  LA Diam was 4 6 cm  LAAs A2C was 18 9 cm2  LAAs A4C was 15 6 cm2  LAESV A-L A2C was 53 6 ml  LAESV A-L A4C was 44 7 ml  LAESV Index (A-L) was 25 4 ml/m2  LAESV MOD A2C was 52 7 ml  LAESV MOD A4C was 42 5 ml    LAESV(A-L) was 53 9 ml  LAESV(MOD BP) was 51 9 ml  LAESVInd MOD BP was 24 5 ml/m2  LALs  A2C was 5 6 cm  LALs A4C was 4 6 cm  LVEDV MOD A4C was 96 5 ml  LVEF MOD A4C was 70 1 %  LVESV MOD A4C was 28 8 ml  LVIDd was 4 1 cm  LVIDs was 2 8 cm  LVLd A4C was 9 2 cm  LVLs A4C was 6 9 cm  LVPWd was 1 3 cm  RAAs A4C was 15 7  cm2  RAESV A-L was 40 9 ml   RAESV MOD was 39 4 ml  RALs was 5 1 cm  RVIDd was 3 5 cm  SV MOD A4C was 67 7 ml   SV(Teich) was 45 7 ml   CW measurements:  Unspecified Anatomy:   AV Env  Ti was 350 1 ms   AV VTI was 34 9 cm   AV Vmax was 1 4 m/s  AV Vmean was 1 m/s  AV maxPG was 7 5 mmHg  AV meanPG was 4 5 mmHg  MM measurements:  Unspecified Anatomy:   TAPSE was 2 8 cm  PW measurements:  Unspecified Anatomy:   DVI was 0 8   E' Avg was 0 1 m/s  E' Lat was 0 1 m/s  E' Sept was 0 1 m/s  E/E' Avg was 11 8   E/E' Lat was 13 3   E/E' Sept was 10 7   LVOT Env  Ti was 318 4 ms  LVOT VTI was 27 8 cm  LVOT Vmax was 1 2 m/s  LVOT Vmean was 0 9 m/s  LVOT maxPG was 5 3 mmHg  LVOT meanPG was 3 4 mmHg  MV A Felix was 0 8 m/s  MV Dec McCurtain was 3 2 m/s2  MV DecT was 234 3 ms   MV E Felix was 0 7 m/s  MV E/A Ratio was 1   RV S' was 0 2 m/s  HISTORY: PRIOR HISTORY: Patient has no history of cardiovascular disease  Acute (recent) myocardial infarction  Risk factors: medication-treated hypercholesterolemia  PRIOR PROCEDURES: Stent ( 15-Mar-2021)  PROCEDURE: The procedure was performed at the bedside  This was a routine study  The transthoracic approach was used  The study included complete 2D imaging, M-mode, complete spectral Doppler, and color Doppler  The heart rate was 57 bpm,  at the start of the study  Image quality was adequate  LEFT VENTRICLE: Size was normal  Systolic function was normal by visual assessment  Ejection fraction was estimated to be 60 %  There were no regional wall motion abnormalities  Wall thickness was mildly increased   Mass was normal  The  changes were consistent with concentric remodeling (increased wall thickness with normal wall mass)  DOPPLER: Doppler parameters were consistent with abnormal left ventricular relaxation (grade 1 diastolic dysfunction)  RIGHT VENTRICLE: The size was normal  Systolic function was normal  Wall thickness was normal     LEFT ATRIUM: Size was normal     RIGHT ATRIUM: Size was normal     MITRAL VALVE: Valve structure was normal  There was normal leaflet separation  DOPPLER: The transmitral velocity was within the normal range  There was no evidence for stenosis  There was no regurgitation  AORTIC VALVE: The valve was trileaflet  Leaflets exhibited normal thickness and normal cuspal separation  DOPPLER: Transaortic velocity was within the normal range  There was no evidence for stenosis  There was no regurgitation  TRICUSPID VALVE: The valve structure was normal  There was normal leaflet separation  DOPPLER: The transtricuspid velocity was within the normal range  There was no evidence for stenosis  There was no regurgitation  PULMONIC VALVE: Leaflets exhibited normal thickness, no calcification, and normal cuspal separation  DOPPLER: The transpulmonic velocity was within the normal range  There was trace regurgitation  PERICARDIUM: There was no pericardial effusion  The pericardium was normal in appearance  AORTA: The root exhibited normal size  There was mild dilatation of the ascending aorta  SYSTEMIC VEINS: IVC: The inferior vena cava was normal in size and course   Respirophasic changes were normal     SYSTEM MEASUREMENT TABLES    2D  %FS: 31 8 %  Ao Diam: 3 4 cm  Ao asc: 3 1 cm  EDV(Teich): 75 8 ml  EF(Teich): 60 2 %  ESV(Teich): 30 2 ml  IVSd: 1 2 cm  LA Diam: 4 6 cm  LAAs A2C: 18 9 cm2  LAAs A4C: 15 6 cm2  LAESV A-L A2C: 53 6 ml  LAESV A-L A4C: 44 7 ml  LAESV Index (A-L): 25 4 ml/m2  LAESV MOD A2C: 52 7 ml  LAESV MOD A4C: 42 5 ml  LAESV(A-L): 53 9 ml  LAESV(MOD BP): 51 9 ml  LAESVInd MOD BP: 24 5 ml/m2  LALs A2C: 5 6 cm  LALs A4C: 4 6 cm  LVEDV MOD A4C: 96 5 ml  LVEF MOD A4C: 70 1 %  LVESV MOD A4C: 28 8 ml  LVIDd: 4 1 cm  LVIDs: 2 8 cm  LVLd A4C: 9 2 cm  LVLs A4C: 6 9 cm  LVPWd: 1 3 cm  RAAs A4C: 15 7 cm2  RAESV A-L: 40 9 ml  RAESV MOD: 39 4 ml  RALs: 5 1 cm  RVIDd: 3 5 cm  SV MOD A4C: 67 7 ml  SV(Teich): 45 7 ml    CW  AV Env  Ti: 350 1 ms  AV VTI: 34 9 cm  AV Vmax: 1 4 m/s  AV Vmean: 1 m/s  AV maxP 5 mmHg  AV meanP 5 mmHg    MM  TAPSE: 2 8 cm    PW  DVI: 0 8  E' Av 1 m/s  E' Lat: 0 1 m/s  E' Sept: 0 1 m/s  E/E' Av 8  E/E' Lat: 13 3  E/E' Sept: 10 7  LVOT Env  Ti: 318 4 ms  LVOT VTI: 27 8 cm  LVOT Vmax: 1 2 m/s  LVOT Vmean: 0 9 m/s  LVOT maxP 3 mmHg  LVOT meanPG: 3 4 mmHg  MV A Felix: 0 8 m/s  MV Dec Kimble: 3 2 m/s2  MV DecT: 234 3 ms  MV E Felix: 0 7 m/s  MV E/A Ratio: 1  RV S': 0 2 m/s    [facility] Accredited Echocardiography Laboratory    Prepared and electronically signed by    JUANITA Ambrose    Signed 16-Mar-2021 09:20:39         ======================================================    Lab Results   Component Value Date    WBC 7 30 2021    HGB 13 6 2021    HCT 41 1 2021    MCV 89 2021     2021      Lab Results   Component Value Date    SODIUM 140 2021    K 4 2 2021     2021    CO2 26 2021    BUN 16 2021    CREATININE 0 90 2021    GLUC 130 2021    CALCIUM 9 1 2021      Lab Results   Component Value Date    HGBA1C 6 3 (H) 2021      Lab Results   Component Value Date    CHOL 150 2014     Lab Results   Component Value Date    HDL 42 2021    HDL 49 2020    HDL 53 2019     Lab Results   Component Value Date    LDLCALC 38 2021    LDLCALC 105 (H) 2020    LDLCALC 116 (H) 2019     Lab Results   Component Value Date    TRIG 89 2021    TRIG 62 2020    TRIG 125 2019     No results found for: [place], [place]   Lab Results   Component Value Date    INR 1 09 03/15/2021    INR 1 05 08/02/2015    PROTIME 13 9 03/15/2021    PROTIME 13 5 08/02/2015        Imaging:   I have personally reviewed pertinent reports

## 2021-03-18 NOTE — NURSING NOTE
Pt discharged home, walked to [facility] accompanied by spouse and RN  IV removed, all belongings taken  Discharge and cardiac cath teaching and instructions provided, py and spouse verbalized understanding of the same

## 2021-03-19 ENCOUNTER — TRANSITIONAL CARE MANAGEMENT (OUTPATIENT)
Dept: FAMILY MEDICINE CLINIC | Facility: CLINIC | Age: 71
End: 2021-03-19

## 2021-03-24 ENCOUNTER — OFFICE VISIT (OUTPATIENT)
Dept: CARDIOLOGY CLINIC | Facility: CLINIC | Age: 71
End: 2021-03-24
Payer: COMMERCIAL

## 2021-03-24 VITALS
DIASTOLIC BLOOD PRESSURE: 58 MMHG | BODY MASS INDEX: 27.71 KG/M2 | HEIGHT: 71 IN | WEIGHT: 197.9 LBS | OXYGEN SATURATION: 100 % | HEART RATE: 69 BPM | SYSTOLIC BLOOD PRESSURE: 92 MMHG

## 2021-03-24 DIAGNOSIS — Z95.5 S/P DRUG ELUTING CORONARY STENT PLACEMENT: Primary | ICD-10-CM

## 2021-03-24 DIAGNOSIS — E78.5 DYSLIPIDEMIA: ICD-10-CM

## 2021-03-24 DIAGNOSIS — R73.01 IFG (IMPAIRED FASTING GLUCOSE): ICD-10-CM

## 2021-03-24 PROCEDURE — 1160F RVW MEDS BY RX/DR IN RCRD: CPT | Performed by: NURSE PRACTITIONER

## 2021-03-24 PROCEDURE — 1036F TOBACCO NON-USER: CPT | Performed by: NURSE PRACTITIONER

## 2021-03-24 PROCEDURE — 99215 OFFICE O/P EST HI 40 MIN: CPT | Performed by: NURSE PRACTITIONER

## 2021-03-24 RX ORDER — METOPROLOL SUCCINATE 25 MG/1
25 TABLET, EXTENDED RELEASE ORAL
Qty: 30 TABLET | Refills: 3 | Status: SHIPPED | OUTPATIENT
Start: 2021-03-24 | End: 2021-06-15

## 2021-03-24 NOTE — PATIENT INSTRUCTIONS
2gm sodium low fat low cholesterol, low carbohydrate, no concentrated sweets, eating fresh is best       CBC, BMP in near future   Metoprolol succinate 12 5mg daily at bedtime    Call our office if you experience lightheadedness or dizziness

## 2021-03-24 NOTE — PROGRESS NOTES
Cardiology Follow Up    [facility]  1950  [id number]  [facility]  [facility] Krysten Tobias [address] [address] [phone]    Sp EMILIA    Interval History:   Mr Adeline Ortez was admitted to [facility] on 3/15 - 3/18/21 with ACS  Emergency room after being exhausted from physical activity  He admitted to chest pain and dyspnea while riding his bike  His wife brought him to the emergency center for they recommended present team to the emergency room for further evaluation  In the emergency room he was found to have slight inferior ST elevations with lateral ST depressions  Troponin elevated to 0 48  He was started on IV heparin IV nitroglycerin loaded with Brilinta  Cardiology was consulted  NSTEMI type I   On 3/15/21 Mr Bonilla He  underwent urgent cardiac catheterization  Which showed mid % stenosis, 1st diagonal 50% stenosis at the ostium of the vessel segment, ostial circumflex 55% stenosis, mid circumflex 100% stenosis  PCI with stent procedure performed on 100% lesion in the mid circumflex  Resolute jelani drug-eluting stent placed  Plan was to return for staged PCI to the mid LAD  3/16/21 TTE showed LV   Systolic function normal, LVEF 60%, no regional wall motion abnormality  Wall thickness mildly increased  Grade 1 DD  Trace pulmonic valve regurgitation, mild dilatation of the ascending aorta  3/16/21 HgbA1C 6 3, TC 98, TG 89, HDL 42, LDL 38     3/17/21 staged PCI,   Successful balloon angioplasty with stent and balloon angioplasty procedure performed on the 80% lesion in the mid LAD following intervention 0% residual stenosis  Drug-eluting stent was placed  A balloon angioplasty procedure performed on 100% lesion post stenting from plaque shift in the 2nd diagonal   Following intervention 0% residual stenosis  He was discharged home on ASA, Brilinta and statin    BB held secondary to bradycardia  Mr Mary Ellen Heredia our office for recent hospitalization follow-up visit  He denies dyspnea with minimal exertion chest pain palpitations lightheadedness or dizziness  He does understand why he is being seen in this office today  He felt it was for cardiac rehabilitation  I have reviewed reasons is to present to our office after his hospitalization with a non NSTEMI type 1 an intervention to his coronary arteries  Patient Active Problem List   Diagnosis    IFG (impaired fasting glucose)    Hyperlipidemia    Acute coronary syndrome (HCC)    Murmur     Past Medical History:   Diagnosis Date    Coronary artery disease     Femur fracture, right ([facility] [address] ) 2015    Hyperlipidemia      Social History     Socioeconomic History    Marital status: /Civil Union     Spouse name: Not on file    Number of children: 1    Years of education: Not on file    Highest education level: Not on file   Occupational History    Occupation: Retired    Social Needs    Financial resource strain: Not hard at all   [place] insecurity     Worry: Never true     Inability: Never true   [facility] needs     Medical: No     Non-medical: No   Tobacco Use    Smoking status: Never Smoker    Smokeless tobacco: Never Used   Substance and Sexual Activity    Alcohol use: Yes     Frequency: Monthly or less     Comment: occasionally    Drug use: No    Sexual activity: Yes   Lifestyle    Physical activity     Days per week: 7 days     Minutes per session: 80 min    Stress: Not at all   Relationships    Social connections     Talks on phone: More than three times a week     Gets together: More than three times a week     Attends Tenriism service: Never     Active member of club or organization: Yes     Attends meetings of clubs or organizations: Never     Relationship status:     Intimate partner violence     Fear of current or ex partner: No     Emotionally abused:  No Physically abused: No     Forced sexual activity: No   Other Topics Concern    Not on file   Social History Narrative        Non smoker    No caffeine use    Weekly alcohol cosumption    No recreational drug use    Always wears seatbelts    Retired    Lives with wife    No living will    No Roman Catholic preference      Family History   Problem Relation Age of Onset    Heart disease Mother     Thyroid disease Mother     Hypertension Mother     Pancreatic cancer Father     Lung cancer Maternal Aunt      Past Surgical History:   Procedure Laterality Date    CARDIAC CATHETERIZATION      CORONARY STENT PLACEMENT      TOTAL HIP ARTHROPLASTY         Current Outpatient Medications:     aspirin (ECOTRIN LOW STRENGTH) 81 mg EC tablet, Take 1 tablet (81 mg total) by mouth daily, Disp: 30 tablet, Rfl: 0    atorvastatin (LIPITOR) 80 mg tablet, Take 1 tablet (80 mg total) by mouth daily with dinner, Disp: 30 tablet, Rfl: 0    cholecalciferol (VITAMIN D3) 1,000 units tablet, Take 1,000 Units by mouth daily, Disp: , Rfl:     ticagrelor (BRILINTA) 90 MG, Take 1 tablet (90 mg total) by mouth 2 (two) times a day, Disp: 60 tablet, Rfl: 0  No Known Allergies    Labs:  No results displayed because visit has over 200 results  Imaging: Xr Chest 1 View Portable    Result Date: 3/15/2021  Narrative: CHEST INDICATION: Chest discomfort COMPARISON:  5/12/2020 EXAM PERFORMED/VIEWS:  XR CHEST PORTABLE Images: 2 FINDINGS: Cardiomediastinal silhouette appears unremarkable  The lungs are clear  No pneumothorax or pleural effusion  Osseous structures appear within normal limits for patient age  Impression: No acute cardiopulmonary disease  Findings are stable Workstation performed: [id number]       Review of Systems:  Review of Systems   All other systems reviewed and are negative  Physical Exam:  Physical Exam  Vitals signs reviewed  Constitutional:       Appearance: Normal appearance     HENT:      Head: Normocephalic  Eyes:      Pupils: Pupils are equal, round, and reactive to light  Neck:      Musculoskeletal: Normal range of motion and neck supple  Cardiovascular:      Rate and Rhythm: Normal rate and regular rhythm  Pulses: Normal pulses  Heart sounds: Normal heart sounds  Pulmonary:      Effort: Pulmonary effort is normal       Breath sounds: Normal breath sounds  Abdominal:      General: Bowel sounds are normal       Palpations: Abdomen is soft  Musculoskeletal: Normal range of motion  Right lower leg: No edema  Left lower leg: No edema  Skin:     General: Skin is warm and dry  Capillary Refill: Capillary refill takes less than 2 seconds  Comments: Right radial cath site appears healed   Neurological:      General: No focal deficit present  Mental Status: He is alert and oriented to person, place, and time  Psychiatric:         Mood and Affect: Mood normal          Behavior: Behavior normal      BP RUE sitting 126/70    Discussion/Summary:  1  3/15/21 sp PCI with stent procedure performed on 100% lesion in the mid circumflex  Resolute jelani drug-eluting stent placed  3/17/21 staged PCI, Successful balloon angioplasty with stent and balloon angioplasty procedure performed on the 80% lesion in the mid LAD following intervention 0% residual stenosis  Drug-eluting stent was placed  A balloon angioplasty procedure performed on 100% lesion post stenting from plaque shift in the 2nd diagonal   Following intervention 0% residual stenosis  Instructed to continue aspirin 81 mg daily, Brilinta 90 mg b i d  for 1 year do not stop for any reason  Continue Lipitor 80 mg daily  Blood pressure 126/70 with a heart rate of 69 beats per minute  Will initiate metoprolol succinate 12 5 mg daily at bedtime  He was instructed to call our office if he experiences lightheadedness or dizziness  I have encouraged able to undergo cardiac rehabilitation    He does not feel the need for cardiac rehabilitation at this time due to him being an avid biker  He will present for cardiac rehabilitation consultation and determine the need after  Continue heart healthy diet  CBC, BMP in near future   2  Dyslipidemia, 3/16/21  TC 98, TG 89, HDL 42, LDL 38 - continue on Lipitor 80mg daily, low fat low carbohydrate diet   3   Impaired fasting glucose 3/16/21 HgbA1C 6 3- instructed on diet control limiting carbohydrates and avoiding concentrated sweets, follow up with PCP

## 2021-03-25 ENCOUNTER — APPOINTMENT (OUTPATIENT)
Dept: LAB | Facility: HOSPITAL | Age: 71
End: 2021-03-25
Payer: COMMERCIAL

## 2021-03-25 ENCOUNTER — RA CDI HCC (OUTPATIENT)
Dept: OTHER | Facility: HOSPITAL | Age: 71
End: 2021-03-25

## 2021-03-25 LAB
ANION GAP SERPL CALCULATED.3IONS-SCNC: 3 MMOL/L (ref 4–13)
BASOPHILS # BLD AUTO: 0.07 THOUSANDS/ΜL (ref 0–0.1)
BASOPHILS NFR BLD AUTO: 1 % (ref 0–1)
BUN SERPL-MCNC: 22 MG/DL (ref 5–25)
CALCIUM SERPL-MCNC: 8.3 MG/DL (ref 8.3–10.1)
CHLORIDE SERPL-SCNC: 109 MMOL/L (ref 100–108)
CO2 SERPL-SCNC: 29 MMOL/L (ref 21–32)
CREAT SERPL-MCNC: 0.99 MG/DL (ref 0.6–1.3)
EOSINOPHIL # BLD AUTO: 0.33 THOUSAND/ΜL (ref 0–0.61)
EOSINOPHIL NFR BLD AUTO: 4 % (ref 0–6)
ERYTHROCYTE [DISTWIDTH] IN BLOOD BY AUTOMATED COUNT: 13 % (ref 11.6–15.1)
GFR SERPL CREATININE-BSD FRML MDRD: 78 ML/MIN/1.73SQ M
GLUCOSE P FAST SERPL-MCNC: 122 MG/DL (ref 65–99)
HCT VFR BLD AUTO: 43.7 % (ref 36.5–49.3)
HGB BLD-MCNC: 14.4 G/DL (ref 12–17)
IMM GRANULOCYTES # BLD AUTO: 0.04 THOUSAND/UL (ref 0–0.2)
IMM GRANULOCYTES NFR BLD AUTO: 1 % (ref 0–2)
LYMPHOCYTES # BLD AUTO: 2.22 THOUSANDS/ΜL (ref 0.6–4.47)
LYMPHOCYTES NFR BLD AUTO: 28 % (ref 14–44)
MCH RBC QN AUTO: 29.3 PG (ref 26.8–34.3)
MCHC RBC AUTO-ENTMCNC: 33 G/DL (ref 31.4–37.4)
MCV RBC AUTO: 89 FL (ref 82–98)
MONOCYTES # BLD AUTO: 0.86 THOUSAND/ΜL (ref 0.17–1.22)
MONOCYTES NFR BLD AUTO: 11 % (ref 4–12)
NEUTROPHILS # BLD AUTO: 4.35 THOUSANDS/ΜL (ref 1.85–7.62)
NEUTS SEG NFR BLD AUTO: 55 % (ref 43–75)
NRBC BLD AUTO-RTO: 0 /100 WBCS
PLATELET # BLD AUTO: 262 THOUSANDS/UL (ref 149–390)
PMV BLD AUTO: 10.1 FL (ref 8.9–12.7)
POTASSIUM SERPL-SCNC: 4 MMOL/L (ref 3.5–5.3)
RBC # BLD AUTO: 4.91 MILLION/UL (ref 3.88–5.62)
SODIUM SERPL-SCNC: 141 MMOL/L (ref 136–145)
WBC # BLD AUTO: 7.87 THOUSAND/UL (ref 4.31–10.16)

## 2021-03-25 PROCEDURE — 85025 COMPLETE CBC W/AUTO DIFF WBC: CPT | Performed by: NURSE PRACTITIONER

## 2021-03-25 PROCEDURE — 36415 COLL VENOUS BLD VENIPUNCTURE: CPT | Performed by: NURSE PRACTITIONER

## 2021-03-25 PROCEDURE — 80048 BASIC METABOLIC PNL TOTAL CA: CPT | Performed by: NURSE PRACTITIONER

## 2021-03-25 NOTE — PROGRESS NOTES
[facility]  coding oppertunities          Chart reviewed, no opportunity found: CHART REVIEWED, NO OPPORTUNITY FOUND

## 2021-03-30 ENCOUNTER — OFFICE VISIT (OUTPATIENT)
Dept: FAMILY MEDICINE CLINIC | Facility: CLINIC | Age: 71
End: 2021-03-30
Payer: COMMERCIAL

## 2021-03-30 VITALS
HEIGHT: 71 IN | BODY MASS INDEX: 27.16 KG/M2 | HEART RATE: 66 BPM | WEIGHT: 194 LBS | RESPIRATION RATE: 18 BRPM | DIASTOLIC BLOOD PRESSURE: 64 MMHG | SYSTOLIC BLOOD PRESSURE: 122 MMHG | OXYGEN SATURATION: 97 % | TEMPERATURE: 99 F

## 2021-03-30 DIAGNOSIS — Z09 HOSPITAL DISCHARGE FOLLOW-UP: Primary | ICD-10-CM

## 2021-03-30 DIAGNOSIS — R73.01 IFG (IMPAIRED FASTING GLUCOSE): ICD-10-CM

## 2021-03-30 DIAGNOSIS — I25.119 CORONARY ARTERY DISEASE INVOLVING NATIVE CORONARY ARTERY OF NATIVE HEART WITH ANGINA PECTORIS (HCC): ICD-10-CM

## 2021-03-30 PROCEDURE — 99495 TRANSJ CARE MGMT MOD F2F 14D: CPT | Performed by: FAMILY MEDICINE

## 2021-03-30 PROCEDURE — 3008F BODY MASS INDEX DOCD: CPT | Performed by: NURSE PRACTITIONER

## 2021-03-30 NOTE — PROGRESS NOTES
Assessment/Plan:     Coronary artery disease involving native coronary artery of native heart with angina pectoris ([address] )  EMILIA placed in mid circumflex and LAD 3/15 and 3/17  Pt has set up care with cardiology since discharge  He is compliant with new meds including asa, brilinta bid, lipitor 80 and metoprolol succ 12 5 nightly  Hospital discharge follow-up  Meds reconcilled  Pt compliant  IFG (impaired fasting glucose)  Pt will try diet and excerise  Discussed metformin today and pt declines at this time  Diagnoses and all orders for this visit:    Hospital discharge follow-up    IFG (impaired fasting glucose)    Coronary artery disease involving native coronary artery of native heart with angina pectoris [facility])         Subjective: hospital discharge follow-up     Patient ID: Gonzalez Gann is a 78 y o  male  HPI  Patient was hospitalized from March 15th until March 18th for an acute coronary syndrome  While the patient was riding his bike he noted chest pain and dyspnea  He was found to have slight inferior ST elevation with lateral ST depressions with a elevated troponin at 0 48  He was taken in for a urgent cardiac catheterization March 15th where a drug-eluting stent was placed it the 100% lesion in the mid circumflex  The next day a repeat catheterization was done and a drug-eluting stent was placed at the 90% lesion in the LAD and 100% occlusion at 2nd diagonal     he was placed on aspirin 81, Brilinta 90 twice daily and Lipitor was increased from 20-80 mg  He was not started on a beta-blocker due to bradycardia  A 2D echocardiogram showed a left ventricular EF of 60% and grade 1 diastolic dysfunction  He was also found to have prediabetes with an A1c of 6 3  He was recently seen outpatient by Cardiology and was started on metoprolol 25 mg half tablet at bedtime  He is already biking   He biked 13 miles on Saturday and 15 miles today, slow paced without any chest pain or shortness of breath  Discussed food choices today  He states his weakness is candy, cookies, cakes  BMI Counseling: Body mass index is 27 06 kg/m²  The BMI is above normal  Nutrition recommendations include decreasing portion sizes, encouraging healthy choices of fruits and vegetables, decreasing fast food intake, consuming healthier snacks and limiting drinks that contain sugar  Exercise recommendations include moderate physical activity 150 minutes/week  Review of Systems   Constitutional: Negative for activity change, appetite change, fever and unexpected weight change  HENT: Negative for ear pain, postnasal drip and rhinorrhea  Eyes: Negative for photophobia and pain  Respiratory: Negative for cough, shortness of breath and wheezing  Cardiovascular: Negative for chest pain, palpitations and leg swelling  Gastrointestinal: Negative for abdominal pain, blood in stool, nausea and vomiting  Endocrine: Negative for polydipsia and polyuria  Genitourinary: Negative for difficulty urinating, hematuria and urgency  Musculoskeletal: Negative for myalgias  Skin: Negative for rash  Neurological: Negative for dizziness  Psychiatric/Behavioral: Negative for confusion and sleep disturbance  Objective:     Physical Exam  Constitutional:       Appearance: He is well-developed  HENT:      Head: Normocephalic and atraumatic  Right Ear: External ear normal       Left Ear: External ear normal       Mouth/Throat:      Pharynx: No oropharyngeal exudate  Eyes:      Conjunctiva/sclera: Conjunctivae normal       Pupils: Pupils are equal, round, and reactive to light  Neck:      Musculoskeletal: Normal range of motion and neck supple  Cardiovascular:      Rate and Rhythm: Normal rate and regular rhythm  Heart sounds: Normal heart sounds  No murmur  No friction rub  No gallop  Pulmonary:      Effort: Pulmonary effort is normal  No respiratory distress        Breath sounds: Normal breath sounds  No wheezing or rales  Chest:      Chest wall: No tenderness  Abdominal:      General: Bowel sounds are normal  There is no distension  Palpations: Abdomen is soft  There is no mass  Tenderness: There is no abdominal tenderness  There is no rebound  Musculoskeletal: Normal range of motion  Skin:     General: Skin is warm and dry  Neurological:      Mental Status: He is alert and oriented to person, place, and time  Vitals:    03/30/21 1409   BP: 122/64   BP Location: Left arm   Patient Position: Sitting   Cuff Size: Adult   Pulse: 66   Resp: 18   Temp: 99 °F (37 2 °C)   TempSrc: Tympanic   SpO2: 97%   Weight: 88 kg (194 lb)   Height: 5' 11" (1 803 m)       Transitional Care Management Review:  Bassem Phelan is a 78 y o  male here for TCM follow up  During the TCM phone call patient stated:    TCM Call (since 2/27/2021)     Date and time call was made  3/19/2021 11:15 AM    Hospital care reviewed  Records reviewed    Patient was hospitialized at  [facility]        Date of Admission  03/15/21    Date of discharge  03/18/21    Diagnosis  Acute coronary syndrome     Disposition  Home    Current Symptoms  None      TCM Call (since 2/27/2021)     Post hospital issues  None    Should patient be enrolled in anticoag monitoring? No    Scheduled for follow up?   Yes    Patients specialists  Cardiologist    Did you obtain your prescribed medications  Yes    Do you need help managing your prescriptions or medications  No    Is transportation to your appointment needed  No    I have advised the patient to call PCP with any new or worsening symptoms  YESS[address] [address] or Significiant other    Support System  None    Are you recieving any outpatient services  No    Are you recieving home care services  No    Are you using any community resources  No    Current waiver services  No    Have you fallen in the last 12 months  No    Interperter language line needed  Audra Soto MD

## 2021-03-30 NOTE — ASSESSMENT & PLAN NOTE
EMILIA placed in mid circumflex and LAD 3/15 and 3/17  Pt has set up care with cardiology since discharge  He is compliant with new meds including asa, brilinta bid, lipitor 80 and metoprolol succ 12 5 nightly

## 2021-04-05 ENCOUNTER — TELEPHONE (OUTPATIENT)
Dept: CARDIAC REHAB | Age: 71
End: 2021-04-05

## 2021-04-06 ENCOUNTER — CLINICAL SUPPORT (OUTPATIENT)
Dept: CARDIAC REHAB | Age: 71
End: 2021-04-06
Payer: COMMERCIAL

## 2021-04-06 DIAGNOSIS — I25.9 CHEST PAIN DUE TO MYOCARDIAL ISCHEMIA, UNSPECIFIED ISCHEMIC CHEST PAIN TYPE: ICD-10-CM

## 2021-04-06 DIAGNOSIS — R07.9 CHEST PAIN: ICD-10-CM

## 2021-04-06 DIAGNOSIS — I21.4 NSTEMI (NON-ST ELEVATED MYOCARDIAL INFARCTION) (HCC): ICD-10-CM

## 2021-04-06 PROCEDURE — 93797 PHYS/QHP OP CAR RHAB WO ECG: CPT

## 2021-04-06 NOTE — PROGRESS NOTES
CARDIAC REHAB ASSESSMENT    Today's date: 2021  Patient name: Julianna Toth     : 1950       MRN: [id number]  PCP: Chitra Forrest MD  Referring Physician: Jermaine Walton PA-C  Cardiologist: Sheila Stone DO  Surgeon:   Dx:   Encounter Diagnoses   Name Primary?  Chest pain     NSTEMI (non-ST elevated myocardial infarction) ([facility] )        Date of onset: 3/15/21  Cultural needs: none    Height:    Wt Readings from Last 1 Encounters:   21 88 kg (194 lb)      Weight:   Ht Readings from Last 1 Encounters:   21 5' 11" (1 803 m)     Medical History:   Past Medical History:   Diagnosis Date    Coronary artery disease     Femur fracture, right ([facility] )     Hyperlipidemia          Physical Limitations: none  R hip rep  15 yrs ago    Fall Risk: Low   Comments: Ambulates with a steady gait with no assist device    Anginal Equivalent: Vomiting and Chest Pain, weariness   NTG use: No prescription    Risk Factors   Cholesterol: Yes  Smoking: Never used  HTN: No  DM: impaired fasting glucose   Obesity: No   Inactivity: No  Stress:  perceived  stress: 3/10   Stressors:none   Goals for Stress Management:Exercise    Family History:  Family History   Problem Relation Age of Onset    Heart disease Mother     Thyroid disease Mother     Hypertension Mother     Pancreatic cancer Father     Lung cancer Maternal Aunt        Allergies: Patient has no known allergies    ETOH:   Social History     Substance and Sexual Activity   Alcohol Use Yes    Frequency: Monthly or less    Comment: occasionally         Current Medications:   Current Outpatient Medications   Medication Sig Dispense Refill    aspirin (ECOTRIN LOW STRENGTH) 81 mg EC tablet Take 1 tablet (81 mg total) by mouth daily 30 tablet 0    atorvastatin (LIPITOR) 80 mg tablet Take 1 tablet (80 mg total) by mouth daily with dinner 30 tablet 0    cholecalciferol (VITAMIN D3) 1,000 units tablet Take 1,000 Units by mouth daily      metoprolol succinate (TOPROL-XL) 25 mg 24 hr tablet Take 1 tablet (25 mg total) by mouth daily at bedtime 1/2 tab daily at bedtime 30 tablet 3    ticagrelor (BRILINTA) 90 MG Take 1 tablet (90 mg total) by mouth 2 (two) times a day 60 tablet 0     No current facility-administered medications for this visit  Functional Status Prior to Diagnosis for Treatment   Occupation: retired    Recreation: photography while riding  ADLs: No limitations  [place]: No limitations  Exercise:tries to bikes 10,000 miles each year - 300 rides per year - trail  Other:     Current Functional Status  Occupation: retired  Recreation: resumed all   ADLs:No limitations  [place]: No limitations  Exercise: resumed riding - 15-20 miles x 8 days  Other: slightly worried about something else may happen    Patient Specific Goals:  Nothing specific - build up strength - upper body,     Short Term Program Goals: dietary modifications increased strength improved BG control wt loss 1-2 ppw    Long Term Goals: Improved lipid profile  weight loss goal of 5lbs  improved Rate Your Plate Score    Ability to reach goals/rehabilitation potential:  Excellent    Projected return to function: 12 weeks  Objective tests: sub-max TM ETT      Nutritional   Reviewed details of Rate your Plate  Discussed key elements of heart healthy eating  Reviewed patient goals for dietary modifications and their clinical implications  Reviewed most recent lipid profile       Goals for dietary modification: choose lean cuts of meat  poultry without the skin  low fat ground meat and poultry  eliminate processed meats  reduce portions of meat to 3 oz  increase fish intake  more meatless meals  low fat dairy   reduced fat cheese  increase whole grains  increase fruits and vegetables  eliminate butter  low sodium  improved snack choices  more nuts/seeds  reduce sweets/frozen desserts  heathier choices while dining out      Emotional/Social  Patient reports he/she is coping well with good social support and denies depression or anxiety    Marital status:   Brother  Large family  neighbors      Domestic Violence Screening: No    Comments: angina/vomiting - while biking

## 2021-04-06 NOTE — PROGRESS NOTES
Cardiac Rehabilitation Plan of Care   Initial Care Plan          Today's date: 2021   # of Exercise Sessions Completed: Initial Evaluation Today  Patient name: Trixie Cooney      : 1950  Age: 78 y o  MRN: [id number]  Referring Physician: Cheikh Boone PA-C  Cardiologist: Dilan Estrada DO  Provider: Nirmal Oneil  Clinician: Brett Goldstein, MS, CEP, CCRP    Dx:   Encounter Diagnoses   Name Primary?  Chest pain     NSTEMI (non-ST elevated myocardial infarction) [facility])      Date of onset: 3/15/21      SUMMARY OF PROGRESS:  Today is Colin's initial evaluation to begin Cardiac Rehab now 3 wks post EMILIA x 1 mLCX, EMILIA x 1 LAD  The patient does currently follow a formal exercise program at home, including biking 15-17 miles per day  He has resumed all ADLs without limitations  Depression screening using the PHQ-9 interprets the patient's score 0 =No Depression  CORINNA-7 screening tool for anxiety suggests 0-4  = Not anxious  When addressed, the patient denies having depression/anxiety  Patient reports excellent social/emotional support  Information to begin using [employer] was provided as well as contact information for counseling through [facility]  PHQ-9 score will be reassessed in 30 days  The patient is a non-smoker  Patient admits to 100% medication compliance  The patient completed an initial submaximal TM ETT  The patient completed 1 minutes of stage III (4 3METs) with test termination of RHR +30  Resting  /60 with appropriate hemodynamic response to exercise reaching 190/80  Patient denied symptoms during exercise  Telemetry revealed NSR, occ PVCs, PACs  Patient was counseled on exercise guidelines to achieve a minimum of 150 mins/wk of moderate intensity (RPE 4-6) exercise and encouraged to continue exercising on opposite days of cardiac rehab as tolerated   We discussed current dietary habits and goals of heart healthy eating for lipid management, diabetes management and weight loss The patient has impaired fasting glucose but is not required to monitor home BG  Patient's goals include: increased upper body strenght, reducing added sugars and sweets, return to biking unrestricted   The patient's CAD risk factors include:  obesity/overweight, hyperlipidemia and IFG  His education will focus on lifestyle modification/education specific to His needs  Patient will attend group education classes on heart healthy eating, reading food labels, stress management, risk factor reduction, understanding heart disease and common heart medications  Patient will attend 35 monitored exercise sessions, 3x/wk for 12-18 weeks beginning 2021           Medication compliance: Yes   Comments: Pt reports to be compliant with medications  Fall Risk: Low   Comments: Ambulates with a steady gait with no assist device    EKG Interpretation: NSR, occ PVCs, PACs      EXERCISE ASSESSMENT and PLAN    Current Exercise Program in Rehab:       Frequency: 3 days/week Supplement with home exercise 2+ days/wk as tolerated       Minutes: 30-40         METS: 2 0-4 0            HR: 100-135   RPE: 4-5         Modalities: Treadmill, Airdyne bike, UBE, Lifecycle, Elliptical and Rower      Exercise Progression 30 Day Goals :    Frequency: 3 days/week of cardiac rehab     Supplement with home exercise 2+ days/wk as tolerated    Minutes: 40                            >150 mins/wk of moderate intensity exercise   METS: 3 5 - 5 5   HR: 100-135    RPE: 4-6   Modalities: Treadmill, Airdyne bike, Lifecycle, Elliptical and Rower    Strength trainin-3 days / week  12-15 repetitions  1-2 sets per modality    Modalities: Leg Press, Chest Press, Pull Downs, Lateral Raise and Arm Curl    Home Exercise: Type: trail biking, Frequency: 5-6 days/week, Distance 15-17 miles    Goals: improved DASI score by 10%, Increase in exercise capacity by 40% - based on peak METs tolerated in cardiac rehab exercise session, Resume ADLs with increased strength, Attend Rehab regularly and return to logging miles on his bike with the goal of at least 8,000 miles/year    Progression Toward Goals:  Reviewed Pt goals and determined plan of care    Education: benefit of exercise for CAD risk factors, AHA guidelines to achieve >150 mins/wk of moderate exercise and RPE scale   Plan:education on home exercise guidelines, home exercise 30+ mins 2 days opposite CR and Education class: Risk Factors for Heart Disease  Readiness to change: Action:  (Changing behavior)      NUTRITION ASSESSMENT AND PLAN    Weight control:    Starting weight: 197 4   Current weight:     Waist circumference:    Startin   Current:      Diabetes: IFG  A1c: 6 3    last measured: 3/16/21    Lipid management: Discussed diet and lipid management and Last lipid profile 3/16/21  Chol 98  TRG 89  HDL 42  LDL 38    Goals:reduced BMI to < 25, decreased body fat% <25%   (M), fasting BG , Improved Rate Your Plate score  >33, Wt  loss 1-2 ppw,  goal of 5 lbs , reduce portion sizes of meat to 3oz or less, increase intake of fish, shellfish, increase intake of meatless meals, reduce cheese intake or use reduced-fat, eliminate butter, Increase intake of nuts and seeds and seldom eat or choose low fat ice-cream, fruit juice bars or frozen yogurt     Progression Toward Goals: Reviewed Pt goals and determined plan of care    Education: heart healthy eating  low sodium diet  nutrition for  lipid management  nutrition for Improved BG control  exercise and diabetes management   wt  loss   Plan: Education class: Reading Food Labels, Education Class: Heart Healthy Eating, switch to low fat cheeses, replace butter with soft spreads made with olive oil, canola or yogurt, replace refined grain bread with whole grain bread, reduce red meat 1x/wk, switch to skim or 1% milk, eat fewer desserts and sweets, avoid processed foods, learn how to read food labels, replace sugar with stevia or truvia and keep added daily sugar <25g/day  Readiness to change: Preparation:  (Getting ready to change)       PSYCHOSOCIAL ASSESSMENT AND PLAN    Emotional:  Depression assessment:  PHQ-9 = 0 =No Depression            Anxiety measure:  CORINNA-7 = 0-4  = Not anxious  Self-reported stress level:  3  Social support: Excellent and Patient reports excellent emotional/social support from family    Goals:  Reduce perceived stress to 1-3/10, Change in Health in [place] Score < 3  and stop worrying    Progression Toward Goals: Reviewed Pt goals and determined plan of care    Education: benefits of a positive support system, stress management techniques and depression and CAD  Plan: Class: Stress and Your Health, Class: Relaxation, Exercise, Enjoy a hobby and Return to previous social activity  Readiness to change: Preparation:  (Getting ready to change)       OTHER CORE COMPONENTS     Tobacco:   Social History     Tobacco Use   Smoking Status Never Smoker   Smokeless Tobacco Never Used       Tobacco Use Intervention:   N/A:  Patient is a non-smoker     Anginal Symptoms:  chest pressure and nausea/vomiting   NTG use: No prescription    Blood pressure:    Restin/60   Exercise: 190/80    Goals: consistent BP < 130/80, moderate intensity exercise >150 mins/wk and medication compliance    Progression Toward Goals: Reviewed Pt goals and determined plan of care    Education:  understanding high blood pressure and it's relationship to CAD and low sodium diet and HTN  Plan: Class: Understanding Heart Disease, Class: Common Heart Medications, Avoid Processed foods, use salt substitutes and check labels for sodium content  Readiness to change: Preparation:  (Getting ready to change)

## 2021-04-07 DIAGNOSIS — I21.4 NSTEMI (NON-ST ELEVATED MYOCARDIAL INFARCTION) (HCC): ICD-10-CM

## 2021-04-07 DIAGNOSIS — R07.9 CHEST PAIN: ICD-10-CM

## 2021-04-07 RX ORDER — ATORVASTATIN CALCIUM 80 MG/1
80 TABLET, FILM COATED ORAL
Qty: 30 TABLET | Refills: 6 | Status: SHIPPED | OUTPATIENT
Start: 2021-04-07 | End: 2021-11-16

## 2021-04-09 ENCOUNTER — CLINICAL SUPPORT (OUTPATIENT)
Dept: CARDIAC REHAB | Age: 71
End: 2021-04-09
Payer: COMMERCIAL

## 2021-04-09 DIAGNOSIS — Z95.5 STENTED CORONARY ARTERY: ICD-10-CM

## 2021-04-09 PROCEDURE — 93798 PHYS/QHP OP CAR RHAB W/ECG: CPT

## 2021-04-12 ENCOUNTER — CLINICAL SUPPORT (OUTPATIENT)
Dept: CARDIAC REHAB | Age: 71
End: 2021-04-12
Payer: COMMERCIAL

## 2021-04-12 DIAGNOSIS — Z95.5 STENTED CORONARY ARTERY: ICD-10-CM

## 2021-04-12 PROCEDURE — 93798 PHYS/QHP OP CAR RHAB W/ECG: CPT

## 2021-04-14 ENCOUNTER — CLINICAL SUPPORT (OUTPATIENT)
Dept: CARDIAC REHAB | Age: 71
End: 2021-04-14
Payer: COMMERCIAL

## 2021-04-14 DIAGNOSIS — Z95.5 S/P PRIMARY ANGIOPLASTY WITH CORONARY STENT: ICD-10-CM

## 2021-04-14 PROCEDURE — 93798 PHYS/QHP OP CAR RHAB W/ECG: CPT

## 2021-04-16 ENCOUNTER — CLINICAL SUPPORT (OUTPATIENT)
Dept: CARDIAC REHAB | Age: 71
End: 2021-04-16
Payer: COMMERCIAL

## 2021-04-16 DIAGNOSIS — Z95.5 S/P CORONARY ARTERY STENT PLACEMENT: ICD-10-CM

## 2021-04-16 PROCEDURE — 93798 PHYS/QHP OP CAR RHAB W/ECG: CPT

## 2021-04-19 ENCOUNTER — OFFICE VISIT (OUTPATIENT)
Dept: CARDIOLOGY CLINIC | Facility: CLINIC | Age: 71
End: 2021-04-19
Payer: COMMERCIAL

## 2021-04-19 VITALS
WEIGHT: 196 LBS | SYSTOLIC BLOOD PRESSURE: 144 MMHG | HEIGHT: 71 IN | DIASTOLIC BLOOD PRESSURE: 62 MMHG | BODY MASS INDEX: 27.44 KG/M2 | HEART RATE: 57 BPM

## 2021-04-19 DIAGNOSIS — R01.1 MURMUR: ICD-10-CM

## 2021-04-19 DIAGNOSIS — E78.5 HYPERLIPIDEMIA, UNSPECIFIED HYPERLIPIDEMIA TYPE: ICD-10-CM

## 2021-04-19 DIAGNOSIS — I25.119 CORONARY ARTERY DISEASE INVOLVING NATIVE CORONARY ARTERY OF NATIVE HEART WITH ANGINA PECTORIS (HCC): Primary | ICD-10-CM

## 2021-04-19 PROCEDURE — 93000 ELECTROCARDIOGRAM COMPLETE: CPT

## 2021-04-19 PROCEDURE — 99214 OFFICE O/P EST MOD 30 MIN: CPT

## 2021-04-19 PROCEDURE — 1160F RVW MEDS BY RX/DR IN RCRD: CPT

## 2021-04-19 PROCEDURE — 3008F BODY MASS INDEX DOCD: CPT

## 2021-04-19 NOTE — PROGRESS NOTES
Cardiology   MD Shaun Medrano MD Dane Fresh, DO, [facility]  MD Indira Galarza DO, Yakelin Juarez DO, [facility]  -------------------------------------------------------------------  Formerly [facility]  [address] [place] [id number]  [phone]  04/19/21  Na Ogden  YOB: 1950   MRN: [id number]      Referring Physician: Johana Gardner MD  [address]     HPI: Na Ogden is a 78 y o  male with   · Coronary artery disease with drug-eluting stent placement to the left circumflex in the setting of a ST segment elevation myocardial infarction March 2021 with staged PCI to the LAD there after   · Dyslipidemia   · Preserved EF with grade 1 diastolic dysfunction    He presents today for follow-up  He states he is doing well in cardiac rehab, he is back to riding his bicycle again as well, notes no chest pain or shortness of breath  Tolerating medications well without issues  Review of Systems   Constitutional: Negative for chills and fever  HENT: Negative for facial swelling and sore throat  Eyes: Negative for visual disturbance  Respiratory: Negative for cough, chest tightness, shortness of breath and wheezing  Cardiovascular: Negative for chest pain, palpitations and leg swelling  Gastrointestinal: Negative for abdominal pain, blood in stool, constipation, diarrhea, nausea and vomiting  Endocrine: Negative for cold intolerance and heat intolerance  Genitourinary: Negative for decreased urine volume, difficulty urinating, dysuria and hematuria  Musculoskeletal: Negative for arthralgias, back pain and myalgias  Skin: Negative for rash  Neurological: Negative for dizziness, syncope, weakness and numbness  Psychiatric/Behavioral: Negative for agitation, behavioral problems and confusion  The patient is not nervous/anxious  OBJECTIVE  Vitals:    04/19/21 0930   BP: 144/62   Pulse: 57       Physical Exam  Constitutional: awake, alert and oriented, in no acute distress, no obvious deformities  Vital signs as above  Head: Normocephalic, without obvious abnormality, atraumatic  Eyes: conjunctivae clear and moist  Sclera anicteric  No xanthelasmas  Pupils equal bilaterally  Extraocular motions are full  Ear nose mouth and throat: ears are symmetrical bilaterally, hearing appears to be equal bilaterally, no nasal discharge or epistaxis, oropharynx is clear with moist mucous membranes  Neck:  Trachea is midline, neck is supple, no thyromegaly or significant lymphadenopathy, there is full range of motion  Lungs: clear to auscultation bilaterally, no wheezes, no rales, no rhonchi, no accessory muscle use, breathing is nonlabored  Heart: regular rate and rhythm, S1, S2 normal, no murmur, click, rub or gallop, no lower extremity edema  Abdomen: soft, non-tender; bowel sounds normal; no masses,  no organomegaly  Psychiatric:  Patient is oriented to time, place, person, mood/affect is negative for depression, anxiety, agitation, appears to have appropriate insight  Skin: Skin is warm, dry, intact  No obvious rashes or lesions on exposed extremities  Nail beds are pink with no cyanosis or clubbing    EKG:  Results for orders placed or performed in visit on 04/19/21   POCT ECG    Impression    Sinus bradycardia with nonspecific ST and T-wave changes        IMPRESSION:  · Coronary artery disease with drug-eluting stent placement to the left circumflex in the setting of a ST segment elevation myocardial infarction March 2021 with staged PCI to the LAD there after   · Dyslipidemia   · Preserved EF with grade 1 diastolic dysfunction    DISCUSSION/RECOMMENDATIONS:   Doing well status post drug-eluting stents x2 to the LAD and circumflex in the setting of STEMI   Continue dual antiplatelet therapy with aspirin, Brilinta x1 year at least  Continue Lipitor 80   Continue metoprolol succinate 25   Plan to check lipid panel in 3 months and for follow-up thereafter    Cinthia Zhu DO, [facility] [place] [facility] [place]  --------------------------------------------------------------------------------  TREADMILL STRESS  No results found for this or any previous visit    ----------------------------------------------------------------------------------------------  NUCLEAR STRESS TEST: No results found for this or any previous visit  No results found for this or any previous visit     --------------------------------------------------------------------------------  CATH:    Results for orders placed during the hospital encounter of 03/15/21   Cardiac catheterization    Narrative [address] [place] [address], [address] [facility]  [phone]    [facility]    Invasive Cardiovascular Lab Complete Report    Patient: Danyelle Smith  MR number: [id number]  Account number: [de-identified]  Study date: 2021  Gender: Male  : 1950  Height: 70 9 in  Weight: 202 6 lb  BSA: 2 12 mï¾²    Allergies: NO KNOWN ALLERGIES    Interventional Cardiologist:  Sherry Carrizales DO  Primary Physician:  Angel Vazquez MD    SUMMARY    CORONARY CIRCULATION:  Mid LAD: There was a 85 % stenosis  1ST LESION INTERVENTIONS:  A successful balloon angioplasty with stent and balloon angioplasty procedure was performed on the 85 % lesion in the mid LAD  Following intervention there was a 0 % residual stenosis  A drug-eluting stent was placed across the lesion and deployed at a maximum inflation pressure of 14 joycelyn  2ND LESION INTERVENTIONS:  A balloon angioplasty procedure was performed on the 100 % lesion post stenting from plaque shift in the 2nd diagonal  Following intervention there was a 0 % residual stenosis after 2mm balloon inflation      INDICATIONS:  --  Possible CAD: unstable angina returns to lab for lad pci    PROCEDURES PERFORMED    --  Left coronary angiography  --  Inpatient  --  Mod Sedation Same Physician Initial 15min  --  Mod Sedation Same Physician Add 15min  --  Coronary Drug Eluting Stent w/PTCA  --  PTCA, Additional Branch of Coronary Artery  --  Intervention on mid LAD: balloon angioplasty, stent, balloon angioplasty  --  Intervention on D2: balloon angioplasty  PROCEDURE: The risks and alternatives of the procedures and conscious sedation were explained to the patient and informed consent was obtained  The patient was brought to the cath lab and placed on the table  The planned puncture sites  were prepped and draped in the usual sterile fashion  --  Right radial artery access  After performing an Paul's test to verify adequate ulnar artery supply to the hand, the radial site was prepped  The puncture site was infiltrated with local anesthetic  The vessel was accessed using the  modified Seldinger technique, a wire was advanced into the vessel, and a sheath was advanced over the wire into the vessel  --  Left coronary artery angiography  A catheter was advanced over a guidewire into the aorta and positioned in the left coronary artery ostium under fluoroscopic guidance  Angiography was performed  --  Inpatient  --  Mod Sedation Same Physician Initial 15min  --  Mod Sedation Same Physician Add 15min  LESION #1 INTERVENTION: A successful balloon angioplasty with stent and balloon angioplasty procedure was performed on the 85 % lesion in the mid LAD  Following intervention there was a 0 % residual stenosis  There was LATRICE 3 flow before  the procedure and LATRICE 3 flow after the procedure  There was no dissection  --  Vessel setup was performed  A BMW   014 190cm wire was used to cross the lesion  --  Vessel setup was performed  A Launcher 6Fr Jl 3 5 guiding catheter was used to cannulate the vessel      --  Balloon angioplasty was performed, using a Trek Rx 2 5 x 15mm balloon, with 3 inflations and a maximum inflation pressure of 16 joycelyn  --  A drug-eluting stent was placed across the lesion and deployed at a maximum inflation pressure of 14 joycelyn  --  Balloon angioplasty was performed, using a NC Euphora Rx 2 75 x 15mm balloon, with 1 inflations and a maximum inflation pressure of 22 joycelyn  --  Balloon angioplasty was performed, using a NC Trek Rx 3 0 x 15mm balloon, with 2 inflations and a maximum inflation pressure of 16 joycelyn  LESION #2 INTERVENTION: A balloon angioplasty procedure was performed on the 100 % lesion post stenting from plaque shift in the 2nd diagonal  Following intervention there was a 0 % residual stenosis after 2mm balloon inflation  There was  LATRICE 3 flow before the procedure and LATRICE 3 flow after the procedure  There was no dissection  --  Vessel setup was performed  A  50 Str 190cm wire was used to cross the lesion  --  Vessel setup was performed  A Launcher 6Fr Jl 3 5 guiding catheter was used to cannulate the vessel  --  Balloon angioplasty was performed, using a Mini Trek Rx 2 0 x 12mm balloon, with 1 inflations and a maximum inflation pressure of 8 joycelyn  INTERVENTIONS:  --  Coronary Drug Eluting Stent w/PTCA  --  PTCA, Additional Branch of Coronary Artery  PROCEDURE COMPLETION: The patient tolerated the procedure well and was discharged from the cath lab  TIMING: Test started at 10:41  Test concluded at 11:17  HEMOSTASIS: The sheath was removed  The site was compressed with a Hemoband  device  Hemostasis was obtained  MEDICATIONS GIVEN: Fentanyl (1OOmcg/2 ml), 50 mcg, IV, at 10:39  Versed (2mg/2ml), 2 mg, IV, at 10:39  1% Lidocaine, 2 ml, subcutaneously, at 10:41  Nitroglycerin (200mcg/ml), 200 mcg, at 10:45  Verapamil  (5mg/2ml), 2 5 mg, IV, at 10:45  Heparin 1000 units/ml, 4,000 units, at 10:45  Heparin 1000 units/ml, 6,000 units, IV, at 10:47  Versed (2mg/2ml), 2 mg, IV, at 10:53  Fentanyl (1OOmcg/2 ml), 50 mcg, IV, at 10:53   Nitroglycerine  (200mcg/ml), 200 mcg, at 11:01  CONTRAST GIVEN: 100 ml Omnipaque (350mg I /ml)  RADIATION EXPOSURE: Fluoroscopy time: 12 42 min  CORONARY VESSELS:   --  Left main: Angiography showed minor luminal irregularities  --  Mid LAD: There was a 85 % stenosis  --  2nd diagonal:    IMPRESSIONS:  PCI of mid LAD with EMILIA  Patent LCX stent  RECOMMENDATIONS  dapt x 1 year, asa indefinitely, gdmt cad    Prepared and signed by    Aliya Chau DO  Signed 2021 12:19:02    Study diagram    Angiographic findings  Native coronary lesions:  ï¾·Mid LAD: Lesion 1: 85 % stenosis  Intervention results  Native coronary lesions:  ï¾·Successful balloon angioplasty, stent, and balloon angioplasty of the 85 % stenosis in mid LAD  0 % residual stenosis  Stent: drug-eluting  ï¾·balloon angioplasty of the 100 % stenosis in D2  0 % residual stenosis  Hemodynamic tables    Pressures:  Baseline  Pressures:  - HR: 73  Pressures:  - Rhythm:  Pressures:  -- Aortic Pressure (S/D/M): --/--/46    Outputs:  Baseline  Outputs:  -- CALCULATIONS: [age]  Outputs:  -- CALCULATIONS: Body Surface Area: 2 12  Outputs:  -- CALCULATIONS: Height in cm: 180 00  Outputs:  -- CALCULATIONS: Sex: Male  Outputs:  -- CALCULATIONS: Weight in k 10       --------------------------------------------------------------------------------  ECHO:   Results for orders placed during the hospital encounter of 03/15/21   Echo complete with contrast if indicated    Narrative [place] [address]  Jamie Gillespie [address]  [place], [address]  [phone]    Transthoracic Echocardiogram  2D, M-mode, Doppler, and Color Doppler    Study date:  16-Mar-2021    Patient: Pratima Frausto  MR number: [id number]  Account number: [de-identified]  : 1950  Age: 78 years  Gender: Male  Status: Inpatient  Location: Bedside  Height: 71 in  Weight: 202 6 lb  BP: 142/ 63 mmHg    Indications: Chest pain  MI      Diagnoses: R07 9 - Chest pain, unspecified    Sonographer:  AVELINA Carty  Primary Physician:  Kris Maya MD  Referring Physician:  Mayito Appiah PA-C  Group:  [facility]  Interpreting Physician:  JUANITA Anglin     SUMMARY    LEFT VENTRICLE:  Systolic function was normal by visual assessment  Ejection fraction was estimated to be 60 %  There were no regional wall motion abnormalities  Wall thickness was mildly increased  Mass was normal  The changes were consistent with concentric remodeling (increased wall thickness with normal wall mass)  Doppler parameters were consistent with abnormal left ventricular relaxation (grade 1 diastolic dysfunction)  PULMONIC VALVE:  There was trace regurgitation  AORTA:  There was mild dilatation of the ascending aorta  SUMMARY MEASUREMENTS  2D measurements:  Unspecified Anatomy:   %FS was 31 8 %  Ao Diam was 3 4 cm  Ao asc was 3 1 cm   EDV(Teich) was 75 8 ml   EF(Teich) was 60 2 %  ESV(Teich) was 30 2 ml  IVSd was 1 2 cm  LA Diam was 4 6 cm  LAAs A2C was 18 9 cm2  LAAs A4C was 15 6 cm2  LAESV A-L A2C was 53 6 ml  LAESV A-L A4C was 44 7 ml  LAESV Index (A-L) was 25 4 ml/m2  LAESV MOD A2C was 52 7 ml  LAESV MOD A4C was 42 5 ml  LAESV(A-L) was 53 9 ml  LAESV(MOD BP) was 51 9 ml  LAESVInd MOD BP was 24 5 ml/m2  LALs  A2C was 5 6 cm  LALs A4C was 4 6 cm  LVEDV MOD A4C was 96 5 ml  LVEF MOD A4C was 70 1 %  LVESV MOD A4C was 28 8 ml  LVIDd was 4 1 cm  LVIDs was 2 8 cm  LVLd A4C was 9 2 cm  LVLs A4C was 6 9 cm  LVPWd was 1 3 cm  RAAs A4C was 15 7  cm2  RAESV A-L was 40 9 ml   RAESV MOD was 39 4 ml  RALs was 5 1 cm  RVIDd was 3 5 cm  SV MOD A4C was 67 7 ml   SV(Teich) was 45 7 ml   CW measurements:  Unspecified Anatomy:   AV Env  Ti was 350 1 ms   AV VTI was 34 9 cm   AV Vmax was 1 4 m/s  AV Vmean was 1 m/s  AV maxPG was 7 5 mmHg  AV meanPG was 4 5 mmHg  MM measurements:  Unspecified Anatomy:   TAPSE was 2 8 cm    PW measurements:  Unspecified Anatomy:   DVI was 0 8   E' Avg was 0 1 m/s  E' Lat was 0 1 m/s  E' Sept was 0 1 m/s  E/E' Avg was 11 8   E/E' Lat was 13 3   E/E' Sept was 10 7   LVOT Env  Ti was 318 4 ms  LVOT VTI was 27 8 cm  LVOT Vmax was 1 2 m/s  LVOT Vmean was 0 9 m/s  LVOT maxPG was 5 3 mmHg  LVOT meanPG was 3 4 mmHg  MV A Felix was 0 8 m/s  MV Dec Wil[place] was 3 2 m/s2  MV DecT was 234 3 ms   MV E Felix was 0 7 m/s  MV E/A Ratio was 1   RV S' was 0 2 m/s  HISTORY: PRIOR HISTORY: Patient has no history of cardiovascular disease  Acute (recent) myocardial infarction  Risk factors: medication-treated hypercholesterolemia  PRIOR PROCEDURES: Stent ( 15-Mar-2021)  PROCEDURE: The procedure was performed at the bedside  This was a routine study  The transthoracic approach was used  The study included complete 2D imaging, M-mode, complete spectral Doppler, and color Doppler  The heart rate was 57 bpm,  at the start of the study  Image quality was adequate  LEFT VENTRICLE: Size was normal  Systolic function was normal by visual assessment  Ejection fraction was estimated to be 60 %  There were no regional wall motion abnormalities  Wall thickness was mildly increased  Mass was normal  The  changes were consistent with concentric remodeling (increased wall thickness with normal wall mass)  DOPPLER: Doppler parameters were consistent with abnormal left ventricular relaxation (grade 1 diastolic dysfunction)  RIGHT VENTRICLE: The size was normal  Systolic function was normal  Wall thickness was normal     LEFT ATRIUM: Size was normal     RIGHT ATRIUM: Size was normal     MITRAL VALVE: Valve structure was normal  There was normal leaflet separation  DOPPLER: The transmitral velocity was within the normal range  There was no evidence for stenosis  There was no regurgitation  AORTIC VALVE: The valve was trileaflet  Leaflets exhibited normal thickness and normal cuspal separation   DOPPLER: Transaortic velocity was within the normal range  There was no evidence for stenosis  There was no regurgitation  TRICUSPID VALVE: The valve structure was normal  There was normal leaflet separation  DOPPLER: The transtricuspid velocity was within the normal range  There was no evidence for stenosis  There was no regurgitation  PULMONIC VALVE: Leaflets exhibited normal thickness, no calcification, and normal cuspal separation  DOPPLER: The transpulmonic velocity was within the normal range  There was trace regurgitation  PERICARDIUM: There was no pericardial effusion  The pericardium was normal in appearance  AORTA: The root exhibited normal size  There was mild dilatation of the ascending aorta  SYSTEMIC VEINS: IVC: The inferior vena cava was normal in size and course  Respirophasic changes were normal     SYSTEM MEASUREMENT TABLES    2D  %FS: 31 8 %  Ao Diam: 3 4 cm  Ao asc: 3 1 cm  EDV(Teich): 75 8 ml  EF(Teich): 60 2 %  ESV(Teich): 30 2 ml  IVSd: 1 2 cm  LA Diam: 4 6 cm  LAAs A2C: 18 9 cm2  LAAs A4C: 15 6 cm2  LAESV A-L A2C: 53 6 ml  LAESV A-L A4C: 44 7 ml  LAESV Index (A-L): 25 4 ml/m2  LAESV MOD A2C: 52 7 ml  LAESV MOD A4C: 42 5 ml  LAESV(A-L): 53 9 ml  LAESV(MOD BP): 51 9 ml  LAESVInd MOD BP: 24 5 ml/m2  LALs A2C: 5 6 cm  LALs A4C: 4 6 cm  LVEDV MOD A4C: 96 5 ml  LVEF MOD A4C: 70 1 %  LVESV MOD A4C: 28 8 ml  LVIDd: 4 1 cm  LVIDs: 2 8 cm  LVLd A4C: 9 2 cm  LVLs A4C: 6 9 cm  LVPWd: 1 3 cm  RAAs A4C: 15 7 cm2  RAESV A-L: 40 9 ml  RAESV MOD: 39 4 ml  RALs: 5 1 cm  RVIDd: 3 5 cm  SV MOD A4C: 67 7 ml  SV(Teich): 45 7 ml    CW  AV Env  Ti: 350 1 ms  AV VTI: 34 9 cm  AV Vmax: 1 4 m/s  AV Vmean: 1 m/s  AV maxP 5 mmHg  AV meanP 5 mmHg    MM  TAPSE: 2 8 cm    PW  DVI: 0 8  E' Av 1 m/s  E' Lat: 0 1 m/s  E' Sept: 0 1 m/s  E/E' Av 8  E/E' Lat: 13 3  E/E' Sept: 10 7  LVOT Env  Ti: 318 4 ms  LVOT VTI: 27 8 cm  LVOT Vmax: 1 2 m/s  LVOT Vmean: 0 9 m/s  LVOT maxP 3 mmHg  LVOT meanPG: 3 4 mmHg  MV A Felix: 0 8 m/s  MV Dec Parmer: 3 2 m/s2  MV DecT: 234 3 ms  MV E Felix: 0 7 m/s  MV E/A Ratio: 1  RV S': 0 2 m/s    [facility]    Prepared and electronically signed by    JUANITA Agudelo  Signed 16-Mar-2021 09:20:39       No results found for this or any previous visit   --------------------------------------------------------------------------------  HOLTER  No results found for this or any previous visit  No results found for this or any previous visit   --------------------------------------------------------------------------------  CAROTIDS  No results found for this or any previous visit    --------------------------------------------------------------------------------  Diagnoses and all orders for this visit:    Coronary artery disease involving native coronary artery of native heart with angina pectoris ([place] [id number] )  -     POCT ECG  -     Lipid panel; Future    Hyperlipidemia, unspecified hyperlipidemia type  -     POCT ECG  -     Lipid panel;  Future    Murmur  -     POCT ECG       ======================================================    Past Medical History:   Diagnosis Date    Coronary artery disease     Femur fracture, right ([place] [address] ) 2015    Hyperlipidemia      Past Surgical History:   Procedure Laterality Date    CARDIAC CATHETERIZATION      CORONARY STENT PLACEMENT      TOTAL HIP ARTHROPLASTY           Medications  Current Outpatient Medications   Medication Sig Dispense Refill    aspirin (ECOTRIN LOW STRENGTH) 81 mg EC tablet Take 1 tablet (81 mg total) by mouth daily 30 tablet 0    atorvastatin (LIPITOR) 80 mg tablet Take 1 tablet (80 mg total) by mouth daily with dinner 30 tablet 6    cholecalciferol (VITAMIN D3) 1,000 units tablet Take 1,000 Units by mouth daily      metoprolol succinate (TOPROL-XL) 25 mg 24 hr tablet Take 1 tablet (25 mg total) by mouth daily at bedtime 1/2 tab daily at bedtime 30 tablet 3    ticagrelor (BRILINTA) 90 MG Take 1 tablet (90 mg total) by mouth 2 (two) times a day 60 tablet 10     No current facility-administered medications for this visit           No Known Allergies    Social History     Socioeconomic History    Marital status: /Civil Union     Spouse name: Not on file    Number of children: 1    Years of education: Not on file    Highest education level: Not on file   Occupational History    Occupation: Retired    Social Needs    Financial resource strain: Not hard at all   [place] insecurity     Worry: Never true     Inability: Never true   [employer] needs     Medical: No     Non-medical: No   Tobacco Use    Smoking status: Never Smoker    Smokeless tobacco: Never Used   Substance and Sexual Activity    Alcohol use: Yes     Frequency: Monthly or less     Comment: occasionally    Drug use: No    Sexual activity: Yes   Lifestyle    Physical activity     Days per week: 7 days     Minutes per session: 80 min    Stress: Not at all   Relationships    Social connections     Talks on phone: More than three times a week     Gets together: More than three times a week     Attends Worship service: Never     Active member of club or organization: Yes     Attends meetings of clubs or organizations: Never     Relationship status:     Intimate partner violence     Fear of current or ex partner: No     Emotionally abused: No     Physically abused: No     Forced sexual activity: No   Other Topics Concern    Not on file   Social History Narrative        Non smoker    No caffeine use    Weekly alcohol cosumption    No recreational drug use    Always wears seatbelts    Retired    Lives with wife    No living will    No Worship preference        Family History   Problem Relation Age of Onset    Heart disease Mother     Thyroid disease Mother     Hypertension Mother     Pancreatic cancer Father     Lung cancer Maternal Aunt        Lab Results   Component Value Date    WBC 7 87 03/25/2021    HGB 14 4 03/25/2021    HCT 43 7 03/25/2021    MCV 89 03/25/2021     03/25/2021      Lab Results   Component Value Date    SODIUM 141 03/25/2021    K 4 0 03/25/2021     (H) 03/25/2021    CO2 29 03/25/2021    BUN 22 03/25/2021    CREATININE 0 99 03/25/2021    GLUC 130 03/18/2021    CALCIUM 8 3 03/25/2021      Lab Results   Component Value Date    HGBA1C 6 3 (H) 03/16/2021      Lab Results   Component Value Date    CHOL 150 11/18/2014     Lab Results   Component Value Date    HDL 42 03/16/2021    HDL 49 09/16/2020    HDL 53 09/03/2019     Lab Results   Component Value Date    LDLCALC 38 03/16/2021    LDLCALC 105 (H) 09/16/2020    LDLCALC 116 (H) 09/03/2019     Lab Results   Component Value Date    TRIG 89 03/16/2021    TRIG 62 09/16/2020    TRIG 125 09/03/2019     No results found for: [place], [place]   Lab Results   Component Value Date    INR 1 09 03/15/2021    INR 1 05 08/02/2015    PROTIME 13 9 03/15/2021    PROTIME 13 5 08/02/2015          Patient Active Problem List    Diagnosis Date Noted   [facility] discharge follow-up 03/30/2021    Murmur 03/16/2021    Coronary artery disease involving native coronary artery of native heart with angina pectoris ([address] ) 03/15/2021    Hyperlipidemia 09/21/2020    IFG (impaired fasting glucose) 09/06/2018       Portions of the record may have been created with voice recognition software  Occasional wrong word or "sound a like" substitutions may have occurred due to the inherent limitations of voice recognition software  Read the chart carefully and recognize, using context, where substitutions have occurred          Bhakti Aguilar DO, [facility] [place]  4/19/2021 10:19 AM

## 2021-04-21 ENCOUNTER — CLINICAL SUPPORT (OUTPATIENT)
Dept: CARDIAC REHAB | Age: 71
End: 2021-04-21
Payer: COMMERCIAL

## 2021-04-21 ENCOUNTER — APPOINTMENT (OUTPATIENT)
Dept: CARDIAC REHAB | Age: 71
End: 2021-04-21
Payer: COMMERCIAL

## 2021-04-21 DIAGNOSIS — I25.119 CORONARY ARTERY DISEASE INVOLVING NATIVE CORONARY ARTERY OF NATIVE HEART WITH ANGINA PECTORIS (HCC): ICD-10-CM

## 2021-04-21 PROCEDURE — 93798 PHYS/QHP OP CAR RHAB W/ECG: CPT

## 2021-04-23 ENCOUNTER — APPOINTMENT (OUTPATIENT)
Dept: CARDIAC REHAB | Age: 71
End: 2021-04-23
Payer: COMMERCIAL

## 2021-04-23 ENCOUNTER — CLINICAL SUPPORT (OUTPATIENT)
Dept: CARDIAC REHAB | Age: 71
End: 2021-04-23
Payer: COMMERCIAL

## 2021-04-23 DIAGNOSIS — I21.4 NSTEMI (NON-ST ELEVATION MYOCARDIAL INFARCTION) (HCC): ICD-10-CM

## 2021-04-23 PROCEDURE — 93798 PHYS/QHP OP CAR RHAB W/ECG: CPT

## 2021-04-26 ENCOUNTER — APPOINTMENT (OUTPATIENT)
Dept: CARDIAC REHAB | Age: 71
End: 2021-04-26
Payer: COMMERCIAL

## 2021-04-26 ENCOUNTER — CLINICAL SUPPORT (OUTPATIENT)
Dept: CARDIAC REHAB | Age: 71
End: 2021-04-26
Payer: COMMERCIAL

## 2021-04-26 DIAGNOSIS — I21.4 NSTEMI (NON-ST ELEVATION MYOCARDIAL INFARCTION) (HCC): ICD-10-CM

## 2021-04-26 PROCEDURE — 93798 PHYS/QHP OP CAR RHAB W/ECG: CPT

## 2021-04-28 ENCOUNTER — APPOINTMENT (OUTPATIENT)
Dept: CARDIAC REHAB | Age: 71
End: 2021-04-28
Payer: COMMERCIAL

## 2021-04-28 ENCOUNTER — CLINICAL SUPPORT (OUTPATIENT)
Dept: CARDIAC REHAB | Age: 71
End: 2021-04-28
Payer: COMMERCIAL

## 2021-04-28 DIAGNOSIS — I21.4 NSTEMI (NON-ST ELEVATED MYOCARDIAL INFARCTION) (HCC): ICD-10-CM

## 2021-04-28 PROCEDURE — 93798 PHYS/QHP OP CAR RHAB W/ECG: CPT

## 2021-04-30 ENCOUNTER — APPOINTMENT (OUTPATIENT)
Dept: CARDIAC REHAB | Age: 71
End: 2021-04-30
Payer: COMMERCIAL

## 2021-04-30 ENCOUNTER — CLINICAL SUPPORT (OUTPATIENT)
Dept: CARDIAC REHAB | Age: 71
End: 2021-04-30
Payer: COMMERCIAL

## 2021-04-30 DIAGNOSIS — I21.4 NON-ST ELEVATION MYOCARDIAL INFARCTION (NSTEMI) (HCC): ICD-10-CM

## 2021-04-30 PROCEDURE — 93798 PHYS/QHP OP CAR RHAB W/ECG: CPT

## 2021-04-30 NOTE — PROGRESS NOTES
Chiara Elias discharged to home accompanied by son.   Patient provided with the following educational materials upon discharge:  AVS.   Valuables and belongings sent with patient.   discharge summary, discharge instructions and follow up appointments reviewed with patient and son.  Patient and son verbalized understanding. Patient and son to  prescriptions from pharmacy upon discharge.    EXERCISE SESSION DETAIL

## 2021-05-03 ENCOUNTER — APPOINTMENT (OUTPATIENT)
Dept: CARDIAC REHAB | Age: 71
End: 2021-05-03
Payer: COMMERCIAL

## 2021-05-05 ENCOUNTER — APPOINTMENT (OUTPATIENT)
Dept: CARDIAC REHAB | Age: 71
End: 2021-05-05
Payer: COMMERCIAL

## 2021-05-05 NOTE — PROGRESS NOTES
Cardiac Rehabilitation Plan of Care   30 Day Reassessment        Today's date: 2021   # of Exercise Sessions Completed: 10  Patient name: Na Ogden      : 1950  Age: 78 y o  MRN: [id number]  Referring Physician: Madelaine Melo PA-C  Cardiologist: Bryant Barreto DO  Provider: Eddie Doran  Clinician: Nichole Schulte RN    Dx:   Encounter Diagnosis   Name Primary?  Non-ST elevation myocardial infarction (NSTEMI) [facility])      Date of onset: 3/15/21      SUMMARY OF PROGRESS:  La Iverson is compliant attending cardiac rehab exercise sessions 3x/wk  He tolerates 40 mins at 4 4 - 5 4 METs  Resting BP always well controlled 98//72 with appropriate response to exercise reaching 132//70  NSR on tele with rare PVC and occasional PAC and non-conducted beats  RHR 57 - 70 ExHR 104 - 128  He is tolerating progression of intensity levels to maintain RPE 4-6  No cardiac complaints  He is progressing toward wt loss goals with a loss of 7 pounds  Patient has been working on  dietary modifications with the goal of rare red/processed meats, low fat dairy, reduced added sugars and refined flours  Depression screening using the PHQ-9 was reassessed  The patient's score was 0 =No Depression showing an NO CHANGE   CORINNA-7 was reassessed  The patient's score was 0-4  = Not anxious showing an NO CHANGE  When addressed, the patient denies  feelings of depression/anxiety  Patient reports excellent social/emotional support  Patient attends group educational classes on cardiac risk factor modification  His exercise program will be progressed as tolerated to maintain RPE 4-6  Pt will continue to be educated on lifestyle modifications and encouraged to supplement with a home exercise program to reach the following goals: attend CR regularly and return to biking in the next 30 days      Medication compliance: Yes   Comments: Pt reports to be compliant with medications  Fall Risk: Low   Comments: Ambulates with a steady gait with no assist device    EKG Interpretation: NSR, rare PVCs, occ PACs and non-conducted beats      EXERCISE ASSESSMENT and PLAN    Current Exercise Program in Rehab:       Frequency: 3 days/week Supplement with home exercise 2+ days/wk as tolerated       Minutes: 38-40         METS: 3 4-5 4           HR: 104-128   RPE: 4-5         Modalities: Treadmill, Airdyne bike, UBE, Lifecycle, Elliptical and Rower      Exercise Progression 30 Day Goals :    Frequency: 3 days/week of cardiac rehab     Supplement with home exercise 2+ days/wk as tolerated    Minutes: 40                            >150 mins/wk of moderate intensity exercise   METS: 3 8 - 5 8   HR: 110-135    RPE: 4-6   Modalities: Treadmill, Airdyne bike, Lifecycle, Elliptical and Rower    Strength training: Will be added following 2-3 weeks of monitored exercise sessions   Modalities: Leg Press, Chest Press, Pull Downs, Lateral Raise and Arm Curl    Home Exercise: Type: trail biking, Frequency: 5-6 days/week, Distance 15-17 miles    Goals: improved DASI score by 10%, Increase in exercise capacity by 40% - based on peak METs tolerated in cardiac rehab exercise session, Resume ADLs with increased strength, Attend Rehab regularly and return to logging miles on his bike with the goal of at least 8,000 miles/year    Progression Toward Goals:  Pt is progressing and showing improvement  toward the following goals:  improved DASI score by 10%, Increase in exercise capacity by 40% - based on peak METs tolerated in cardiac rehab exercise session, Resume ADLs with increased strength, Attend Rehab regularly and return to logging miles on his bike with the goal of at least 8,000 miles/year   , Patient will attend CR regularly, start strength training in the next 30 days, Will continue to educate and progress as tolerated      Education: benefit of exercise for CAD risk factors, AHA guidelines to achieve >150 mins/wk of moderate exercise and RPE scale Plan:education on home exercise guidelines, home exercise 30+ mins 2 days opposite CR and Education class: Risk Factors for Heart Disease  Readiness to change: Action:  (Changing behavior)      NUTRITION ASSESSMENT AND PLAN    Weight control:    Starting weight: 197 4   Current weight:   190  Waist circumference:    Startin   Current:      Diabetes: IFG  A1c: 6 3    last measured: 3/16/21    Lipid management: Discussed diet and lipid management and Last lipid profile 3/16/21  Chol 98  TRG 89  HDL 42  LDL 38    Goals:reduced BMI to < 25, decreased body fat% <25%   (M), fasting BG , Improved Rate Your Plate score  >82, Wt  loss 1-2 ppw,  goal of 5 lbs , reduce portion sizes of meat to 3oz or less, increase intake of fish, shellfish, increase intake of meatless meals, reduce cheese intake or use reduced-fat, eliminate butter, Increase intake of nuts and seeds and seldom eat or choose low fat ice-cream, fruit juice bars or frozen yogurt     Progression Toward Goals: Pt is progressing and showing improvement  toward the following goals:  reduced BMI to < 25, decreased body fat% <25%   (M), fasting BG , Improved Rate Your Plate score  >36, Wt  loss 1-2 ppw,  goal of 5 lbs , reduce portion sizes of meat to 3oz or less, increase intake of fish, shellfish, increase intake of meatless meals, reduce cheese intake or use reduced-fat, eliminate butter, Increase intake of nuts and seeds and seldom eat or choose low fat ice-cream, fruit juice bars or frozen yogurt    , Patient will continue heart healthy eating in the next 30 days, Will continue to educate and progress as tolerated      Education: heart healthy eating  low sodium diet  nutrition for  lipid management  nutrition for Improved BG control  exercise and diabetes management   wt  loss   Plan: Education class: Reading Food Labels, Education Class: Heart Healthy Eating, switch to low fat cheeses, replace butter with soft spreads made with olive oil, canola or yogurt, replace refined grain bread with whole grain bread, reduce red meat 1x/wk, switch to skim or 1% milk, eat fewer desserts and sweets, avoid processed foods, learn how to read food labels, replace sugar with stevia or truvia and keep added daily sugar <25g/day  Readiness to change: Preparation:  (Getting ready to change)       PSYCHOSOCIAL ASSESSMENT AND PLAN    Emotional:  Depression assessment:  PHQ-9 = 0 =No Depression            Anxiety measure:  CORINNA-7 = 0-4  = Not anxious  Self-reported stress level:  3  Social support: Excellent and Patient reports excellent emotional/social support from family    Goals:  Reduce perceived stress to 1-3/10, Change in Health in [place] Score < 3  and stop worrying    Progression Toward Goals: Pt is progressing and showing improvement  toward the following goals:  Reduce perceived stress to 1-3/10, Change in Health in [place] Score < 3  and stop worrying   , Patient will stopp worrying and use relaxation techniques in the next 30 days, Will continue to educate and progress as tolerated      Education: benefits of a positive support system, stress management techniques and depression and CAD  Plan: Class: Stress and Your Health, Class: Relaxation, Exercise, Enjoy a hobby and Return to previous social activity  Readiness to change: Preparation:  (Getting ready to change)       OTHER CORE COMPONENTS     Tobacco:   Social History     Tobacco Use   Smoking Status Never Smoker   Smokeless Tobacco Never Used       Tobacco Use Intervention:   N/A:  Patient is a non-smoker     Anginal Symptoms:  chest pressure and nausea/vomiting   NTG use: No prescription    Blood pressure:    Restin//72   Exercise: 132//80    Goals: consistent BP < 130/80, moderate intensity exercise >150 mins/wk and medication compliance    Progression Toward Goals: Pt is progressing and showing improvement  toward the following goals:  consistent BP < 130/80, moderate intensity exercise >150 mins/wk and medication compliance   , Patient will attend CR regularly and supplement with home exercise to reach >=150 mins moderate exercise in the next 30 days, Will continue to educate and progress as tolerated      Education:  understanding high blood pressure and it's relationship to CAD, low sodium diet and HTN, Education class:  Common Heart Medications and Education class: Understanding Heart Disease  Plan: Avoid Processed foods, use salt substitutes and check labels for sodium content  Readiness to change: Preparation:  (Getting ready to change)

## 2021-05-07 ENCOUNTER — APPOINTMENT (OUTPATIENT)
Dept: CARDIAC REHAB | Age: 71
End: 2021-05-07
Payer: COMMERCIAL

## 2021-05-10 ENCOUNTER — APPOINTMENT (OUTPATIENT)
Dept: CARDIAC REHAB | Age: 71
End: 2021-05-10
Payer: COMMERCIAL

## 2021-05-10 ENCOUNTER — CLINICAL SUPPORT (OUTPATIENT)
Dept: CARDIAC REHAB | Age: 71
End: 2021-05-10
Payer: COMMERCIAL

## 2021-05-10 DIAGNOSIS — I21.4 NSTEMI (NON-ST ELEVATED MYOCARDIAL INFARCTION) (HCC): ICD-10-CM

## 2021-05-10 PROCEDURE — 93798 PHYS/QHP OP CAR RHAB W/ECG: CPT

## 2021-05-12 ENCOUNTER — CLINICAL SUPPORT (OUTPATIENT)
Dept: CARDIAC REHAB | Age: 71
End: 2021-05-12
Payer: COMMERCIAL

## 2021-05-12 ENCOUNTER — APPOINTMENT (OUTPATIENT)
Dept: CARDIAC REHAB | Age: 71
End: 2021-05-12
Payer: COMMERCIAL

## 2021-05-12 DIAGNOSIS — I21.4 NSTEMI (NON-ST ELEVATION MYOCARDIAL INFARCTION) (HCC): ICD-10-CM

## 2021-05-12 PROCEDURE — 93798 PHYS/QHP OP CAR RHAB W/ECG: CPT

## 2021-05-14 ENCOUNTER — CLINICAL SUPPORT (OUTPATIENT)
Dept: CARDIAC REHAB | Age: 71
End: 2021-05-14
Payer: COMMERCIAL

## 2021-05-14 ENCOUNTER — APPOINTMENT (OUTPATIENT)
Dept: CARDIAC REHAB | Age: 71
End: 2021-05-14
Payer: COMMERCIAL

## 2021-05-14 DIAGNOSIS — I21.4 NSTEMI (NON-ST ELEVATION MYOCARDIAL INFARCTION) (HCC): ICD-10-CM

## 2021-05-14 PROCEDURE — 93798 PHYS/QHP OP CAR RHAB W/ECG: CPT

## 2021-05-17 ENCOUNTER — APPOINTMENT (OUTPATIENT)
Dept: CARDIAC REHAB | Age: 71
End: 2021-05-17
Payer: COMMERCIAL

## 2021-05-19 ENCOUNTER — CLINICAL SUPPORT (OUTPATIENT)
Dept: CARDIAC REHAB | Age: 71
End: 2021-05-19
Payer: COMMERCIAL

## 2021-05-19 ENCOUNTER — APPOINTMENT (OUTPATIENT)
Dept: CARDIAC REHAB | Age: 71
End: 2021-05-19
Payer: COMMERCIAL

## 2021-05-19 DIAGNOSIS — I21.4 NON-ST ELEVATION MYOCARDIAL INFARCTION (NSTEMI) (HCC): ICD-10-CM

## 2021-05-19 PROCEDURE — 93798 PHYS/QHP OP CAR RHAB W/ECG: CPT

## 2021-05-21 ENCOUNTER — CLINICAL SUPPORT (OUTPATIENT)
Dept: CARDIAC REHAB | Age: 71
End: 2021-05-21
Payer: COMMERCIAL

## 2021-05-21 ENCOUNTER — APPOINTMENT (OUTPATIENT)
Dept: CARDIAC REHAB | Age: 71
End: 2021-05-21
Payer: COMMERCIAL

## 2021-05-21 DIAGNOSIS — I21.4 NSTEMI (NON-ST ELEVATION MYOCARDIAL INFARCTION) (HCC): ICD-10-CM

## 2021-05-21 PROCEDURE — 93798 PHYS/QHP OP CAR RHAB W/ECG: CPT

## 2021-05-24 ENCOUNTER — CLINICAL SUPPORT (OUTPATIENT)
Dept: CARDIAC REHAB | Age: 71
End: 2021-05-24
Payer: COMMERCIAL

## 2021-05-24 ENCOUNTER — APPOINTMENT (OUTPATIENT)
Dept: CARDIAC REHAB | Age: 71
End: 2021-05-24
Payer: COMMERCIAL

## 2021-05-24 DIAGNOSIS — I21.4 NSTEMI (NON-ST ELEVATION MYOCARDIAL INFARCTION) (HCC): ICD-10-CM

## 2021-05-24 PROCEDURE — 93798 PHYS/QHP OP CAR RHAB W/ECG: CPT

## 2021-05-26 ENCOUNTER — CLINICAL SUPPORT (OUTPATIENT)
Dept: CARDIAC REHAB | Age: 71
End: 2021-05-26
Payer: COMMERCIAL

## 2021-05-26 ENCOUNTER — APPOINTMENT (OUTPATIENT)
Dept: CARDIAC REHAB | Age: 71
End: 2021-05-26
Payer: COMMERCIAL

## 2021-05-26 DIAGNOSIS — I21.4 NSTEMI (NON-ST ELEVATION MYOCARDIAL INFARCTION) (HCC): ICD-10-CM

## 2021-05-26 PROCEDURE — 93798 PHYS/QHP OP CAR RHAB W/ECG: CPT

## 2021-05-28 ENCOUNTER — APPOINTMENT (OUTPATIENT)
Dept: CARDIAC REHAB | Age: 71
End: 2021-05-28
Payer: COMMERCIAL

## 2021-05-28 ENCOUNTER — CLINICAL SUPPORT (OUTPATIENT)
Dept: CARDIAC REHAB | Age: 71
End: 2021-05-28
Payer: COMMERCIAL

## 2021-05-28 DIAGNOSIS — I21.4 NSTEMI (NON-ST ELEVATION MYOCARDIAL INFARCTION) (HCC): ICD-10-CM

## 2021-05-28 PROCEDURE — 93798 PHYS/QHP OP CAR RHAB W/ECG: CPT

## 2021-06-01 ENCOUNTER — CLINICAL SUPPORT (OUTPATIENT)
Dept: CARDIAC REHAB | Age: 71
End: 2021-06-01
Payer: COMMERCIAL

## 2021-06-01 DIAGNOSIS — I21.4 NSTEMI (NON-ST ELEVATION MYOCARDIAL INFARCTION) (HCC): Primary | ICD-10-CM

## 2021-06-01 PROCEDURE — 93798 PHYS/QHP OP CAR RHAB W/ECG: CPT

## 2021-06-02 ENCOUNTER — APPOINTMENT (OUTPATIENT)
Dept: CARDIAC REHAB | Age: 71
End: 2021-06-02
Payer: COMMERCIAL

## 2021-06-02 ENCOUNTER — CLINICAL SUPPORT (OUTPATIENT)
Dept: CARDIAC REHAB | Age: 71
End: 2021-06-02
Payer: COMMERCIAL

## 2021-06-02 DIAGNOSIS — I21.4 NSTEMI (NON-ST ELEVATION MYOCARDIAL INFARCTION) (HCC): ICD-10-CM

## 2021-06-02 PROCEDURE — 93798 PHYS/QHP OP CAR RHAB W/ECG: CPT

## 2021-06-02 NOTE — PROGRESS NOTES
Cardiac Rehabilitation Plan of Care   60 Day Reassessment        Today's date: 2021   # of Exercise Sessions Completed: 20  Patient name: Staci Rod      : 1950  Age: 73 y o  MRN: [id number]  Referring Physician: Nanetta Severe, PA-C  Cardiologist: Robert Raymond DO  Provider: Frederick Hyman  Clinician: Abi Harley, MS, CEP, CCRP      Dx:   Encounter Diagnosis   Name Primary?  NSTEMI (non-ST elevation myocardial infarction) [facility])      Date of onset: 3/15/21      SUMMARY OF PROGRESS:  Atiya Lee is compliant attending cardiac rehab exercise sessions 3x/wk  He tolerates 40 mins at 4 4 - 5 4 METs  Resting BP always well controlled 98/58 - 122/50 with appropriate response to exercise reaching 112/58 - 154/80  He has resumed biking and logged 1000 miles in May  He states he rides much stronger now then before his stent  NSR on tele with rare PVC non-conducted beats  RHR 54-65 ExHR 104-127  He is tolerating progression of intensity levels to maintain RPE 4-6  No cardiac complaints  He is progressing toward wt loss goals with a loss of 7 pounds  Patient has been working on  dietary modifications and has reduced/eliminated red/processed meats and is eating more meatless meals  He has been encouraged to eat low fat dairy, reduced added sugars and refined flours  Depression screening using the PHQ-9 was reassessed  The patient's score was 0 =No Depression showing an NO CHANGE   CORINNA-7 was reassessed  The patient's score was 0-4  = Not anxious showing an NO CHANGE  When addressed, the patient denies  feelings of depression/anxiety  Patient reports excellent social/emotional support  Patient attends group educational classes on cardiac risk factor modification  His exercise program will be progressed as tolerated to maintain RPE 4-6     Pt will continue to be educated on lifestyle modifications and encouraged to supplement with a home exercise program to reach the following goals: attend CR regularly and continue to increase milage biking in the next 30 days  Medication compliance: Yes   Comments: Pt reports to be compliant with medications  Fall Risk: Low   Comments: Ambulates with a steady gait with no assist device    EKG Interpretation: NSR, rare PVCs, occ PACs and non-conducted beats      EXERCISE ASSESSMENT and PLAN    Current Exercise Program in Rehab:       Frequency: 3 days/week Supplement with home exercise 2+ days/wk as tolerated       Minutes: 40         METS: 3 4-5 4           HR: 104-128   RPE: 4-5         Modalities: Treadmill, Airdyne bike, UBE, Lifecycle, Elliptical and Rower      Exercise Progression 30 Day Goals :    Frequency: 3 days/week of cardiac rehab     Supplement with home exercise 2+ days/wk as tolerated    Minutes: 40                            >150 mins/wk of moderate intensity exercise   METS: 3 8 - 5 8   HR: 110-135    RPE: 4-6   Modalities: Treadmill, Airdyne bike, Lifecycle, Elliptical and Rower    Strength training:   Will be added following 2-3 weeks of monitored exercise sessions   Modalities: Leg Press, Chest Press, Pull Downs, Lateral Raise and Arm Curl    Home Exercise: Type: road biking, Frequency: 3 days/week, up to 1000 miles per month at this point    Goals: improved DASI score by 10%, Increase in exercise capacity by 40% - based on peak METs tolerated in cardiac rehab exercise session, Resume ADLs with increased strength, Attend Rehab regularly and return to logging miles on his bike with the goal of at least 8,000 miles/year    Progression Toward Goals:  Pt is progressing and showing improvement  toward the following goals:  improved DASI score by 10%, Increase in exercise capacity by 40% - based on peak METs tolerated in cardiac rehab exercise session, Resume ADLs with increased strength, Attend Rehab regularly and return to logging miles on his bike with the goal of at least 8,000 miles/year   , Patient will attend CR regularly, start strength training in the next 30 days, Will continue to educate and progress as tolerated  Education: benefit of exercise for CAD risk factors, AHA guidelines to achieve >150 mins/wk of moderate exercise, RPE scale and class: Risk Factors for Heart Disease   Plan:education on home exercise guidelines and home exercise 30+ mins 2 days opposite CR  Readiness to change: Action:  (Changing behavior)      NUTRITION ASSESSMENT AND PLAN    Weight control:    Starting weight: 197 4   Current weight:   190  Waist circumference:    Startin   Current:      Diabetes: IFG  A1c: 6 3    last measured: 3/16/21    Lipid management: Discussed diet and lipid management and Last lipid profile 3/16/21  Chol 98  TRG 89  HDL 42  LDL 38    Goals:reduced BMI to < 25, decreased body fat% <25%   (M), fasting BG , Improved Rate Your Plate score  >56, Wt  loss 1-2 ppw,  goal of 5 lbs , reduce portion sizes of meat to 3oz or less, increase intake of fish, shellfish, increase intake of meatless meals, reduce cheese intake or use reduced-fat, eliminate butter, Increase intake of nuts and seeds and seldom eat or choose low fat ice-cream, fruit juice bars or frozen yogurt     Progression Toward Goals: Pt is progressing and showing improvement  toward the following goals:  reduced BMI to < 25, decreased body fat% <25%   (M), fasting BG , Improved Rate Your Plate score  >12, Wt  loss 1-2 ppw,  goal of 5 lbs , reduce portion sizes of meat to 3oz or less, increase intake of fish, shellfish, increase intake of meatless meals, reduce cheese intake or use reduced-fat, eliminate butter, Increase intake of nuts and seeds and seldom eat or choose low fat ice-cream, fruit juice bars or frozen yogurt    , Patient will continue heart healthy eating in the next 30 days, Will continue to educate and progress as tolerated      Education: heart healthy eating  low sodium diet  nutrition for  lipid management  nutrition for Improved BG control  exercise and diabetes management   wt  loss   Plan: Education class: Reading Food Labels, Education Class: Heart Healthy Eating, switch to low fat cheeses, replace butter with soft spreads made with olive oil, canola or yogurt, replace refined grain bread with whole grain bread, reduce red meat 1x/wk, switch to skim or 1% milk, eat fewer desserts and sweets, avoid processed foods, learn how to read food labels, replace sugar with stevia or truvia and keep added daily sugar <25g/day  Readiness to change: Action:  (Changing behavior)      PSYCHOSOCIAL ASSESSMENT AND PLAN    Emotional:  Depression assessment:  PHQ-9 = 0 =No Depression            Anxiety measure:  CORINNA-7 = 0-4  = Not anxious  Self-reported stress level:  3  Social support: Excellent and Patient reports excellent emotional/social support from family    Goals:  Reduce perceived stress to 1-3/10, Change in Health in [place] Score < 3  and stop worrying    Progression Toward Goals: Pt is progressing and showing improvement  toward the following goals:  Reduce perceived stress to 1-3/10, Change in Health in [place] Score < 3  and stop worrying   , Patient will stopp worrying and use relaxation techniques in the next 30 days, Will continue to educate and progress as tolerated      Education: benefits of a positive support system, stress management techniques, depression and CAD, class:  Stress and Your Health  and class:  Relaxation  Plan: Exercise, Enjoy a hobby and Return to previous social activity  Readiness to change: Action:  (Changing behavior)      OTHER CORE COMPONENTS     Tobacco:   Social History     Tobacco Use   Smoking Status Never Smoker   Smokeless Tobacco Never Used       Tobacco Use Intervention:   N/A:  Patient is a non-smoker     Anginal Symptoms:  chest pressure and nausea/vomiting   NTG use: No prescription    Blood pressure:    Restin/58 - 122/50   Exercise: 112/58 - 154/80    Goals: consistent BP < 130/80, moderate intensity exercise >150 mins/wk and medication compliance    Progression Toward Goals: Pt is progressing and showing improvement  toward the following goals:  consistent BP < 130/80, moderate intensity exercise >150 mins/wk and medication compliance   , Patient will attend CR regularly and supplement with home exercise to reach >=150 mins moderate exercise in the next 30 days, Will continue to educate and progress as tolerated      Education:  understanding high blood pressure and it's relationship to CAD, low sodium diet and HTN, Education class:  Common Heart Medications and Education class: Understanding Heart Disease  Plan: Avoid Processed foods, use salt substitutes and check labels for sodium content  Readiness to change: Preparation:  (Getting ready to change)

## 2021-06-04 ENCOUNTER — APPOINTMENT (OUTPATIENT)
Dept: CARDIAC REHAB | Age: 71
End: 2021-06-04
Payer: COMMERCIAL

## 2021-06-04 ENCOUNTER — CLINICAL SUPPORT (OUTPATIENT)
Dept: CARDIAC REHAB | Age: 71
End: 2021-06-04
Payer: COMMERCIAL

## 2021-06-04 DIAGNOSIS — I21.4 NSTEMI (NON-ST ELEVATION MYOCARDIAL INFARCTION) (HCC): ICD-10-CM

## 2021-06-04 PROCEDURE — 93798 PHYS/QHP OP CAR RHAB W/ECG: CPT

## 2021-06-07 ENCOUNTER — CLINICAL SUPPORT (OUTPATIENT)
Dept: CARDIAC REHAB | Age: 71
End: 2021-06-07
Payer: COMMERCIAL

## 2021-06-07 ENCOUNTER — APPOINTMENT (OUTPATIENT)
Dept: CARDIAC REHAB | Age: 71
End: 2021-06-07
Payer: COMMERCIAL

## 2021-06-07 DIAGNOSIS — I21.4 NSTEMI (NON-ST ELEVATION MYOCARDIAL INFARCTION) (HCC): ICD-10-CM

## 2021-06-07 PROCEDURE — 93798 PHYS/QHP OP CAR RHAB W/ECG: CPT

## 2021-06-09 ENCOUNTER — CLINICAL SUPPORT (OUTPATIENT)
Dept: CARDIAC REHAB | Age: 71
End: 2021-06-09
Payer: COMMERCIAL

## 2021-06-09 ENCOUNTER — APPOINTMENT (OUTPATIENT)
Dept: CARDIAC REHAB | Age: 71
End: 2021-06-09
Payer: COMMERCIAL

## 2021-06-09 DIAGNOSIS — I21.4 NSTEMI (NON-ST ELEVATION MYOCARDIAL INFARCTION) (HCC): ICD-10-CM

## 2021-06-09 PROCEDURE — 93798 PHYS/QHP OP CAR RHAB W/ECG: CPT

## 2021-06-11 ENCOUNTER — CLINICAL SUPPORT (OUTPATIENT)
Dept: CARDIAC REHAB | Age: 71
End: 2021-06-11
Payer: COMMERCIAL

## 2021-06-11 ENCOUNTER — APPOINTMENT (OUTPATIENT)
Dept: CARDIAC REHAB | Age: 71
End: 2021-06-11
Payer: COMMERCIAL

## 2021-06-11 DIAGNOSIS — I21.4 NSTEMI (NON-ST ELEVATION MYOCARDIAL INFARCTION) (HCC): ICD-10-CM

## 2021-06-11 PROCEDURE — 93798 PHYS/QHP OP CAR RHAB W/ECG: CPT

## 2021-06-14 ENCOUNTER — APPOINTMENT (OUTPATIENT)
Dept: CARDIAC REHAB | Age: 71
End: 2021-06-14
Payer: COMMERCIAL

## 2021-06-14 ENCOUNTER — CLINICAL SUPPORT (OUTPATIENT)
Dept: CARDIAC REHAB | Age: 71
End: 2021-06-14
Payer: COMMERCIAL

## 2021-06-14 DIAGNOSIS — I21.4 NON-STEMI (NON-ST ELEVATED MYOCARDIAL INFARCTION) (HCC): ICD-10-CM

## 2021-06-14 PROCEDURE — 93798 PHYS/QHP OP CAR RHAB W/ECG: CPT

## 2021-06-15 DIAGNOSIS — Z95.5 S/P DRUG ELUTING CORONARY STENT PLACEMENT: ICD-10-CM

## 2021-06-15 RX ORDER — METOPROLOL SUCCINATE 25 MG/1
TABLET, EXTENDED RELEASE ORAL
Qty: 90 TABLET | Refills: 1 | Status: SHIPPED | OUTPATIENT
Start: 2021-06-15

## 2021-06-16 ENCOUNTER — APPOINTMENT (OUTPATIENT)
Dept: CARDIAC REHAB | Age: 71
End: 2021-06-16
Payer: COMMERCIAL

## 2021-06-16 ENCOUNTER — CLINICAL SUPPORT (OUTPATIENT)
Dept: CARDIAC REHAB | Age: 71
End: 2021-06-16
Payer: COMMERCIAL

## 2021-06-16 DIAGNOSIS — I21.4 NSTEMI (NON-ST ELEVATED MYOCARDIAL INFARCTION) (HCC): ICD-10-CM

## 2021-06-16 PROCEDURE — 93798 PHYS/QHP OP CAR RHAB W/ECG: CPT

## 2021-06-18 ENCOUNTER — CLINICAL SUPPORT (OUTPATIENT)
Dept: CARDIAC REHAB | Age: 71
End: 2021-06-18
Payer: COMMERCIAL

## 2021-06-18 ENCOUNTER — APPOINTMENT (OUTPATIENT)
Dept: CARDIAC REHAB | Age: 71
End: 2021-06-18
Payer: COMMERCIAL

## 2021-06-18 DIAGNOSIS — I21.4 NSTEMI (NON-ST ELEVATED MYOCARDIAL INFARCTION) (HCC): ICD-10-CM

## 2021-06-18 PROCEDURE — 93798 PHYS/QHP OP CAR RHAB W/ECG: CPT

## 2021-06-21 ENCOUNTER — CLINICAL SUPPORT (OUTPATIENT)
Dept: CARDIAC REHAB | Age: 71
End: 2021-06-21
Payer: COMMERCIAL

## 2021-06-21 ENCOUNTER — APPOINTMENT (OUTPATIENT)
Dept: CARDIAC REHAB | Age: 71
End: 2021-06-21
Payer: COMMERCIAL

## 2021-06-21 DIAGNOSIS — I21.4 NSTEMI (NON-ST ELEVATION MYOCARDIAL INFARCTION) (HCC): ICD-10-CM

## 2021-06-21 PROCEDURE — 93798 PHYS/QHP OP CAR RHAB W/ECG: CPT

## 2021-06-23 ENCOUNTER — CLINICAL SUPPORT (OUTPATIENT)
Dept: CARDIAC REHAB | Age: 71
End: 2021-06-23
Payer: COMMERCIAL

## 2021-06-23 ENCOUNTER — APPOINTMENT (OUTPATIENT)
Dept: CARDIAC REHAB | Age: 71
End: 2021-06-23
Payer: COMMERCIAL

## 2021-06-23 DIAGNOSIS — I21.4 NSTEMI (NON-ST ELEVATION MYOCARDIAL INFARCTION) (HCC): ICD-10-CM

## 2021-06-23 PROCEDURE — 93798 PHYS/QHP OP CAR RHAB W/ECG: CPT

## 2021-06-25 ENCOUNTER — CLINICAL SUPPORT (OUTPATIENT)
Dept: CARDIAC REHAB | Age: 71
End: 2021-06-25
Payer: COMMERCIAL

## 2021-06-25 ENCOUNTER — APPOINTMENT (OUTPATIENT)
Dept: CARDIAC REHAB | Age: 71
End: 2021-06-25
Payer: COMMERCIAL

## 2021-06-25 DIAGNOSIS — I21.4 NSTEMI (NON-ST ELEVATION MYOCARDIAL INFARCTION) (HCC): ICD-10-CM

## 2021-06-25 PROCEDURE — 93798 PHYS/QHP OP CAR RHAB W/ECG: CPT

## 2021-06-28 ENCOUNTER — APPOINTMENT (OUTPATIENT)
Dept: CARDIAC REHAB | Age: 71
End: 2021-06-28
Payer: COMMERCIAL

## 2021-06-28 ENCOUNTER — CLINICAL SUPPORT (OUTPATIENT)
Dept: CARDIAC REHAB | Age: 71
End: 2021-06-28
Payer: COMMERCIAL

## 2021-06-28 DIAGNOSIS — I21.4 NSTEMI (NON-ST ELEVATION MYOCARDIAL INFARCTION) (HCC): ICD-10-CM

## 2021-06-28 PROCEDURE — 93798 PHYS/QHP OP CAR RHAB W/ECG: CPT

## 2021-06-28 NOTE — PROGRESS NOTES
Cardiac Rehabilitation Plan of Care   90 Day Reassessment        Today's date: 2021   # of Exercise Sessions Completed: 31  Patient name: Jia Lake      : 1950  Age: 73 y o  MRN: [id number]  Referring Physician: Hedy Clark PA-C  Cardiologist: Shubham Haynes DO  Provider: Surinder Walker  Clinician: Lorri Amin RN      Dx:   Encounter Diagnosis   Name Primary?  NSTEMI (non-ST elevation myocardial infarction) [facility])      Date of onset: 3/15/21      SUMMARY OF PROGRESS:  Alethea Ryan is compliant attending cardiac rehab exercise sessions 3x/wk  He tolerates 40 mins at 4 9 - 5 4 METs  Resting BP always well controlled 90/50 - 122/62 with appropriate response to exercise reaching 122/60 - 196/70  He rides his bike 3 hours, 30 miles 7 days/week  He states he rides much stronger now then before his stent  Sinus bradycardia HR 40-50's to NSR at rest without ectopy on telemetry  RHR 49-60 ExHR 102-127  He is tolerating progression of intensity levels to maintain RPE 4-6  No cardiac complaints  He is progressing toward wt loss goals with a loss of 5 poundssince starting CR  Patient has been working on  dietary modifications and has reduced/eliminated red/processed meats and is eating more meatless meals  He has been encouraged to eat low fat dairy, reduced added sugars and refined flours  Depression screening using the PHQ-9 was reassessed  The patient's score was 0 =No Depression showing an NO CHANGE   CORINNA-7 was reassessed  The patient's score was 0-4  = Not anxious showing an NO CHANGE  When addressed, the patient denies  feelings of depression/anxiety  Patient reports excellent social/emotional support  Patient attends group educational classes on cardiac risk factor modification  His exercise program will be progressed as tolerated to maintain RPE 4-6     Pt will continue to be educated on lifestyle modifications and encouraged to supplement with a home exercise program to reach the following goals: attend CR regularly and continue to increase milage biking in the next 30 days      Medication compliance: Yes   Comments: Pt reports to be compliant with medications  Fall Risk: Low   Comments: Ambulates with a steady gait with no assist device    EKG Interpretation: Sinus bradycardia 40-50's at rest, NSR      EXERCISE ASSESSMENT and PLAN    Current Exercise Program in Rehab:       Frequency: 3 days/week Supplement with home exercise 2+ days/wk as tolerated       Minutes: 40         METS: 4 9-5 4           HR: 104-127   RPE: 4-5         Modalities: Treadmill, Airdyne bike, UBE, Lifecycle, Elliptical and Rower      Exercise Progression 30 Day Goals :    Frequency: 3 days/week of cardiac rehab     Supplement with home exercise 2+ days/wk as tolerated    Minutes: 40                            >150 mins/wk of moderate intensity exercise   METS: 5 2 - 6 0   HR: 110-135    RPE: 4-6   Modalities: Treadmill, Airdyne bike, Lifecycle, Elliptical and Rower    Strength trainin-3 days / week  12-15 repetitions  1-2 sets per modality    Modalities: Leg Press, Chest Press, Pull Downs and Lateral Raise    Home Exercise: Type: road biking, Frequency: 7 days/week, 30 miles daily    Goals: improved DASI score by 10%, Increase in exercise capacity by 40% - based on peak METs tolerated in cardiac rehab exercise session, Resume ADLs with increased strength, Attend Rehab regularly and return to logging miles on his bike with the goal of at least 8,000 miles/year    Progression Toward Goals:  Pt is progressing and showing improvement  toward the following goals:  improved DASI score by 10%, Increase in exercise capacity by 40% - based on peak METs tolerated in cardiac rehab exercise session, Resume ADLs with increased strength, Attend Rehab regularly and return to logging miles on his bike with the goal of at least 8,000 miles/year   , Patient will attend CR regularly, continue home biking in the next 30 days, Will continue to educate and progress as tolerated  Education: benefit of exercise for CAD risk factors, AHA guidelines to achieve >150 mins/wk of moderate exercise, RPE scale and class: Risk Factors for Heart Disease   Plan:education on home exercise guidelines and home exercise 30+ mins 2 days opposite CR  Readiness to change: Action:  (Changing behavior)      NUTRITION ASSESSMENT AND PLAN    Weight control:    Starting weight: 197 4   Current weight:   192  Waist circumference:    Startin   Current:      Diabetes: IFG  A1c: 6 3    last measured: 3/16/21    Lipid management: Discussed diet and lipid management and Last lipid profile 3/16/21  Chol 98  TRG 89  HDL 42  LDL 38    Goals:reduced BMI to < 25, decreased body fat% <25%   (M), fasting BG , Improved Rate Your Plate score  >36, Wt  loss 1-2 ppw,  goal of 5 lbs , reduce portion sizes of meat to 3oz or less, increase intake of fish, shellfish, increase intake of meatless meals, reduce cheese intake or use reduced-fat, eliminate butter, Increase intake of nuts and seeds and seldom eat or choose low fat ice-cream, fruit juice bars or frozen yogurt     Progression Toward Goals: Pt is progressing and showing improvement  toward the following goals:  reduced BMI to < 25, decreased body fat% <25%   (M), fasting BG , Improved Rate Your Plate score  >73, Wt  loss 1-2 ppw,  goal of 5 lbs , reduce portion sizes of meat to 3oz or less, increase intake of fish, shellfish, increase intake of meatless meals, reduce cheese intake or use reduced-fat, eliminate butter, Increase intake of nuts and seeds and seldom eat or choose low fat ice-cream, fruit juice bars or frozen yogurt    , Patient will continue heart healthy eating in the next 30 days, Will continue to educate and progress as tolerated  Education: heart healthy eating  low sodium diet  nutrition for  lipid management  nutrition for Improved BG control  exercise and diabetes management   wt  loss   education class:  Label Reading  Plan: Education Class: Heart Healthy Eating, switch to low fat cheeses, replace butter with soft spreads made with olive oil, canola or yogurt, replace refined grain bread with whole grain bread, reduce red meat 1x/wk, switch to skim or 1% milk, eat fewer desserts and sweets, avoid processed foods, learn how to read food labels, replace sugar with stevia or truvia and keep added daily sugar <25g/day  Readiness to change: Action:  (Changing behavior)      PSYCHOSOCIAL ASSESSMENT AND PLAN    Emotional:  Depression assessment:  PHQ-9 = 0 =No Depression            Anxiety measure:  CORINNA-7 = 0-4  = Not anxious  Self-reported stress level:  3  Social support: Excellent and Patient reports excellent emotional/social support from family    Goals:  Reduce perceived stress to 1-3/10, Change in Health in [place] Score < 3  and stop worrying    Progression Toward Goals: Pt is progressing and showing improvement  toward the following goals:  Reduce perceived stress to 1-3/10, Change in Health in [place] Score < 3  and stop worrying   , Patient will stopp worrying and use relaxation techniques in the next 30 days, Will continue to educate and progress as tolerated      Education: benefits of a positive support system, stress management techniques, depression and CAD, class:  Stress and Your Health  and class:  Relaxation  Plan: Exercise, Enjoy a hobby and Return to previous social activity  Readiness to change: Action:  (Changing behavior)      OTHER CORE COMPONENTS     Tobacco:   Social History     Tobacco Use   Smoking Status Never Smoker   Smokeless Tobacco Never Used       Tobacco Use Intervention:   N/A:  Patient is a non-smoker     Anginal Symptoms:  chest pressure and nausea/vomiting   NTG use: No prescription    Blood pressure:    Restin/50 - 122/62   Exercise: 122/60 - 196/70    Goals: consistent BP < 130/80, moderate intensity exercise >150 mins/wk and medication compliance    Progression Toward Goals: Pt is progressing and showing improvement  toward the following goals:  consistent BP < 130/80, moderate intensity exercise >150 mins/wk and medication compliance   , Patient will attend CR regularly and supplement with home exercise to reach >=150 mins moderate exercise in the next 30 days, Will continue to educate and progress as tolerated      Education:  understanding high blood pressure and it's relationship to CAD, low sodium diet and HTN, Education class:  Common Heart Medications and Education class: Understanding Heart Disease  Plan: Avoid Processed foods, use salt substitutes and check labels for sodium content  Readiness to change: Preparation:  (Getting ready to change)

## 2021-06-30 ENCOUNTER — CLINICAL SUPPORT (OUTPATIENT)
Dept: CARDIAC REHAB | Age: 71
End: 2021-06-30
Payer: COMMERCIAL

## 2021-06-30 DIAGNOSIS — I21.4 NSTEMI (NON-ST ELEVATION MYOCARDIAL INFARCTION) (HCC): Primary | ICD-10-CM

## 2021-06-30 PROCEDURE — 93798 PHYS/QHP OP CAR RHAB W/ECG: CPT

## 2021-07-02 ENCOUNTER — CLINICAL SUPPORT (OUTPATIENT)
Dept: CARDIAC REHAB | Age: 71
End: 2021-07-02
Payer: COMMERCIAL

## 2021-07-02 DIAGNOSIS — I21.4 NSTEMI (NON-ST ELEVATION MYOCARDIAL INFARCTION) (HCC): Primary | ICD-10-CM

## 2021-07-02 PROCEDURE — 93798 PHYS/QHP OP CAR RHAB W/ECG: CPT

## 2021-07-05 ENCOUNTER — APPOINTMENT (OUTPATIENT)
Dept: CARDIAC REHAB | Age: 71
End: 2021-07-05
Payer: COMMERCIAL

## 2021-07-06 ENCOUNTER — CLINICAL SUPPORT (OUTPATIENT)
Dept: CARDIAC REHAB | Age: 71
End: 2021-07-06
Payer: COMMERCIAL

## 2021-07-06 DIAGNOSIS — I21.4 NSTEMI (NON-ST ELEVATION MYOCARDIAL INFARCTION) (HCC): Primary | ICD-10-CM

## 2021-07-06 PROCEDURE — 93798 PHYS/QHP OP CAR RHAB W/ECG: CPT

## 2021-07-07 ENCOUNTER — CLINICAL SUPPORT (OUTPATIENT)
Dept: CARDIAC REHAB | Age: 71
End: 2021-07-07
Payer: COMMERCIAL

## 2021-07-07 DIAGNOSIS — I21.4 NSTEMI (NON-ST ELEVATION MYOCARDIAL INFARCTION) (HCC): Primary | ICD-10-CM

## 2021-07-07 PROCEDURE — 93798 PHYS/QHP OP CAR RHAB W/ECG: CPT

## 2021-07-09 ENCOUNTER — CLINICAL SUPPORT (OUTPATIENT)
Dept: CARDIAC REHAB | Age: 71
End: 2021-07-09
Payer: COMMERCIAL

## 2021-07-09 DIAGNOSIS — I21.4 NSTEMI (NON-ST ELEVATED MYOCARDIAL INFARCTION) (HCC): ICD-10-CM

## 2021-07-09 PROCEDURE — 93798 PHYS/QHP OP CAR RHAB W/ECG: CPT

## 2021-07-09 NOTE — PROGRESS NOTES
Cardiac Rehabilitation Plan of Care   Discharge        Today's date: 2021   # of Exercise Sessions Completed: 36  Patient name: Beatrice Varela      : 1950  Age: 73 y o  MRN: [id number]  Referring Physician: Shantal Fernandez PA-C  Cardiologist: Jacqueline Reveles DO  Provider: Carmella Sparks  Clinician: Jacinta Esparza, MS, CEP, CCRP      Dx:   Encounter Diagnosis   Name Primary?  NSTEMI (non-ST elevated myocardial infarction) [facility])      Date of onset: 3/15/21      SUMMARY OF PROGRESS:  Discharge note for Julien Blackwell  He had 35% improvement in functional capacity increasing His max METs in the submaximal TM ETT increased from 4 3 to 5 8 with test termination of RHR +30  His exercise tolerance (max METs in tolerated in cardiac rehab) increased by 42%  He had a 9% improvement in the [facility] activity estimated MET level with ADLs and physical activity  His [facility] QOL improved by 9%  PHQ-9 and CORINNA-7 scores remained at 0 - no deprression  His weight decreased by 9 pounds  Waist circumference decreased by 2 inches  Rate Your Plate score improved from 57 to 58  Julien Blackwell has been supplementing CR sessions with home exercise which includes trail biking up to 30 miles per day - achieving 3,000 miles per month up to now  Colin  tolerates 40 mins at 4 7 - 5 4 METs plus wt training  NSR, with rare PVCs on telemetry  RHR 51 - 59, ExHR 100 - 110  Resting /52 - 122/54 with appropriate response to exercise reaching 148/60 - 170/60  All group education classes on cardiac risk factor modification were attended by the patient  Discharge plans include trail biking with the goal of increasing his monthly mileage with the goal of at least 8,000 miles/year  Encouraged Pt to continue exercise  Frequency: 4-6 days/wk, Intensity: RPE 4-5, Time: 40-50 mins daily, 150-200 mins/wk  Pt was encouraged to continue eating heart healthy    Pt was encouraged to remain compliant with medications and f/u with cardiologist with any cardiac symptoms, medication management and updated lipid profile  Medication compliance: Yes   Comments: Pt reports to be compliant with medications  Fall Risk: Low   Comments: Ambulates with a steady gait with no assist device    EKG Interpretation: Sinus bradycardia 40-50's at rest, NSR, rare PVCs      EXERCISE ASSESSMENT and PLAN    Current Exercise Program in Rehab:       Frequency: 3 days/week Supplement with home exercise 2+ days/wk as tolerated       Minutes: 40         METS: 4 9-5 4           HR: 104-127   RPE: 4-5         Modalities: Treadmill, Airdyne bike, UBE, Lifecycle, Elliptical and Rower      Exercise Goals :    Frequency: 3 days/week of cardiac rehab     Supplement with home exercise 2+ days/wk as tolerated    Minutes: 40                            >150 mins/wk of moderate intensity exercise   METS: 5 2 - 6 0   HR: 110-135    RPE: 4-6   Modalities: trail biking    Strength trainin-3 days / week  12-15 repetitions  1-2 sets per modality    Modalities: Leg Press, Chest Press, Pull Downs and Lateral Raise    Home Exercise: Type: road biking, Frequency: 7 days/week, 30 miles daily    Goals: return to logging miles on his bike with the goal of at least 8,000 miles/year    Progression Toward Goals:  Goals met:  improved DASI score by 10%, Increase in exercise capacity by 40% - based on peak METs tolerated in cardiac rehab exercise session, Resume ADLs with increased strength, Attend Rehab regularly and return to logging miles on his bike with the goal of at least 8,000 miles/year , Patient will be encouraged to focus on lifestyle modifications following discharge      Education: benefit of exercise for CAD risk factors, AHA guidelines to achieve >150 mins/wk of moderate exercise, RPE scale and class: Risk Factors for Heart Disease   Plan: trail biking  Readiness to change: Maintenance: (Maintaining the behavior change)      NUTRITION ASSESSMENT AND PLAN    Weight control: Starting weight: 197 4   Current weight:   188  Waist circumference:    Startin   Current:  37    Diabetes: IFG  A1c: 6 3    last measured: 3/16/21    Lipid management: Discussed diet and lipid management and Last lipid profile 3/16/21  Chol 98  TRG 89  HDL 42  LDL 38    Goals reduce portion sizes of meat to 3oz or less, increase intake of fish, shellfish, increase intake of meatless meals, reduce cheese intake or use reduced-fat, eliminate butter, Increase intake of nuts and seeds and seldom eat or choose low fat ice-cream, fruit juice bars or frozen yogurt     Progression Toward Goals: Goals met: reduced BMI to < 25, decreased body fat% <25%   (M), fasting BG , Improved Rate Your Plate score  >07, Wt  loss 1-2 ppw,  goal of 5 lbs , reduce portion sizes of meat to 3oz or less, increase intake of fish, shellfish, increase intake of meatless meals, reduce cheese intake or use reduced-fat, eliminate butter, Increase intake of nuts and seeds and seldom eat or choose low fat ice-cream, fruit juice bars or frozen yogurt  , Patient will be encouraged to focus on lifestyle modifications following discharge      Education: heart healthy eating  low sodium diet  nutrition for  lipid management  nutrition for Improved BG control  exercise and diabetes management   wt  loss   education class: Heart Healthy Eating  education class:  Label Reading  Plan: continue to follow heart healthy eating plan  Readiness to change: Maintenance: (Maintaining the behavior change)      PSYCHOSOCIAL ASSESSMENT AND PLAN    Emotional:  Depression assessment:  PHQ-9 = 0 =No Depression            Anxiety measure:  CORINNA-7 = 0-4  = Not anxious  Self-reported stress level:  3  Social support: Excellent and Patient reports excellent emotional/social support from family    Goals:    Progression Toward Goals: Goals met: Reduce perceived stress to 1-3/10, Change in Health in [place] Score < 3  and stop worrying , Patient will be encouraged to focus on lifestyle modifications following discharge  Education: benefits of a positive support system, stress management techniques, depression and CAD, class:  Stress and Your Health  and class:  Relaxation  Plan: Exercise, Enjoy a hobby and Return to previous social activity  Readiness to change: Action:  (Changing behavior)      OTHER CORE COMPONENTS     Tobacco:   Social History     Tobacco Use   Smoking Status Never Smoker   Smokeless Tobacco Never Used       Tobacco Use Intervention:   N/A:  Patient is a non-smoker     Anginal Symptoms:  chest pressure and nausea/vomiting   NTG use: No prescription    Blood pressure:    Restin/52 - 122/54   Exercise: 148/60 - 170/60    Goals: consistent BP < 130/80, moderate intensity exercise >150 mins/wk and medication compliance    Progression Toward Goals: Goals met:  consistent BP < 130/80, moderate intensity exercise >150 mins/wk and medication compliance , Patient will be encouraged to focus on lifestyle modifications following discharge      Education:  understanding high blood pressure and it's relationship to CAD, low sodium diet and HTN, Education class:  Common Heart Medications and Education class: Understanding Heart Disease  Plan: Avoid Processed foods, use salt substitutes and check labels for sodium content  Readiness to change: Maintenance: (Maintaining the behavior change)

## 2021-08-16 ENCOUNTER — OFFICE VISIT (OUTPATIENT)
Dept: CARDIOLOGY CLINIC | Facility: CLINIC | Age: 71
End: 2021-08-16
Payer: COMMERCIAL

## 2021-08-16 VITALS
DIASTOLIC BLOOD PRESSURE: 68 MMHG | BODY MASS INDEX: 26.26 KG/M2 | SYSTOLIC BLOOD PRESSURE: 124 MMHG | HEART RATE: 68 BPM | WEIGHT: 187.6 LBS | HEIGHT: 71 IN

## 2021-08-16 DIAGNOSIS — I25.119 CORONARY ARTERY DISEASE INVOLVING NATIVE CORONARY ARTERY OF NATIVE HEART WITH ANGINA PECTORIS (HCC): Primary | ICD-10-CM

## 2021-08-16 DIAGNOSIS — E78.2 MIXED HYPERLIPIDEMIA: ICD-10-CM

## 2021-08-16 PROCEDURE — 1036F TOBACCO NON-USER: CPT | Performed by: INTERNAL MEDICINE

## 2021-08-16 PROCEDURE — 99214 OFFICE O/P EST MOD 30 MIN: CPT | Performed by: INTERNAL MEDICINE

## 2021-08-16 PROCEDURE — 3008F BODY MASS INDEX DOCD: CPT | Performed by: INTERNAL MEDICINE

## 2021-08-16 PROCEDURE — 1160F RVW MEDS BY RX/DR IN RCRD: CPT | Performed by: INTERNAL MEDICINE

## 2021-08-16 NOTE — PROGRESS NOTES
Cardiology Follow Up    Griffin Natarajan  1950  [id number]  [facility]  [address]  [address] [address]  [phone]  [phone]    1  Coronary artery disease involving native coronary artery of native heart with angina pectoris ([facility] [address] )     2  Mixed hyperlipidemia       Chief Complaint   Patient presents with    Follow-up     6 m f/u, no cardiac complaints       Interval History: Patient feels well, without complaints  No reported chest pain, shortness of breath, palpitations, lightheadedness, syncope, LE edema, orthopnea, PND, or significant weight changes  Patient remains active without any increased fatigue out of the ordinary          Patient Active Problem List   Diagnosis    IFG (impaired fasting glucose)    Hyperlipidemia    Coronary artery disease involving native coronary artery of native heart with angina pectoris [facility])   [facility] discharge follow-up     Past Medical History:   Diagnosis Date    Coronary artery disease     Femur fracture, right ([facility] [address] ) 2015    Hyperlipidemia      Social History     Socioeconomic History    Marital status: /Civil Union     Spouse name: Not on file    Number of children: 1    Years of education: Not on file    Highest education level: Not on file   Occupational History    Occupation: Retired    Tobacco Use    Smoking status: Never Smoker    Smokeless tobacco: Never Used   Vaping Use    Vaping Use: Never used   Substance and Sexual Activity    Alcohol use: Yes     Comment: occasionally    Drug use: No    Sexual activity: Yes   Other Topics Concern    Not on file   Social History Narrative        Non smoker    No caffeine use    Weekly alcohol cosumption    No recreational drug use    Always wears seatbelts    Retired    Lives with wife    No living will    No Jainism preference     Social Determinants of Health     Financial Resource Strain: Low Risk     Difficulty of Paying Living Expenses: Not hard at all   Food Insecurity: No Food Insecurity    Worried About Running Out of Food in the Last Year: Never true    Darren of Food in the Last Year: Never true   Transportation Needs: No Transportation Needs    Lack of Transportation (Medical): No    Lack of Transportation (Non-Medical): No   Physical Activity: Sufficiently Active    Days of Exercise per Week: 7 days    Minutes of Exercise per Session: 80 min   Stress: No Stress Concern Present    Feeling of Stress : Not at all   Social Connections: Moderately Integrated    Frequency of Communication with Friends and Family: More than three times a week    Frequency of Social Gatherings with Friends and Family: More than three times a week    Attends Sikhism Services: Never    Active Member of Clubs or Organizations:  Yes    Attends Club or Organization Meetings: Never    Marital Status:    Intimate Partner Violence: Not At Risk    Fear of Current or Ex-Partner: No    Emotionally Abused: No    Physically Abused: No    Sexually Abused: No      Family History   Problem Relation Age of Onset    Heart disease Mother     Thyroid disease Mother     Hypertension Mother     Pancreatic cancer Father     Lung cancer Maternal Aunt      Past Surgical History:   Procedure Laterality Date    CARDIAC CATHETERIZATION      CORONARY STENT PLACEMENT      TOTAL HIP ARTHROPLASTY         Current Outpatient Medications:     aspirin (ECOTRIN LOW STRENGTH) 81 mg EC tablet, Take 1 tablet (81 mg total) by mouth daily, Disp: 30 tablet, Rfl: 0    atorvastatin (LIPITOR) 80 mg tablet, Take 1 tablet (80 mg total) by mouth daily with dinner, Disp: 30 tablet, Rfl: 6    cholecalciferol (VITAMIN D3) 1,000 units tablet, Take 1,000 Units by mouth daily, Disp: , Rfl:     metoprolol succinate (TOPROL-XL) 25 mg 24 hr tablet, TAKE 1 TABLET (25 MG TOTAL) BY MOUTH DAILY AT BEDTIME OR 1/2 TAB DAILY AT BEDTIME AS DIRECTED, Disp: 90 tablet, Rfl: 1    ticagrelor (BRILINTA) 90 MG, Take 1 tablet (90 mg total) by mouth 2 (two) times a day, Disp: 60 tablet, Rfl: 10  No Known Allergies    Labs:  Office Visit on 03/24/2021   Component Date Value    WBC 03/25/2021 7 87     RBC 03/25/2021 4 91     Hemoglobin 03/25/2021 14 4     Hematocrit 03/25/2021 43 7     MCV 03/25/2021 89     MCH 03/25/2021 29 3     MCHC 03/25/2021 33 0     RDW 03/25/2021 13 0     MPV 03/25/2021 10 1     Platelets 17/08/5001 262     nRBC 03/25/2021 0     Neutrophils Relative 03/25/2021 55     Immat GRANS % 03/25/2021 1     Lymphocytes Relative 03/25/2021 28     Monocytes Relative 03/25/2021 11     Eosinophils Relative 03/25/2021 4     Basophils Relative 03/25/2021 1     Neutrophils Absolute 03/25/2021 4 35     Immature Grans Absolute 03/25/2021 0 04     Lymphocytes Absolute 03/25/2021 2 22     Monocytes Absolute 03/25/2021 0 86     Eosinophils Absolute 03/25/2021 0 33     Basophils Absolute 03/25/2021 0 07     Sodium 03/25/2021 141     Potassium 03/25/2021 4 0     Chloride 03/25/2021 109*    CO2 03/25/2021 29     ANION GAP 03/25/2021 3*    BUN 03/25/2021 22     Creatinine 03/25/2021 0 99     Glucose, Fasting 03/25/2021 122*    Calcium 03/25/2021 8 3     eGFR 03/25/2021 78    No results displayed because visit has over 200 results  Lab Results   Component Value Date    CHOL 150 11/18/2014    TRIG 89 03/16/2021    TRIG 97 11/18/2014    HDL 42 03/16/2021    HDL 53 11/18/2014    LDLDIRECT 90 11/18/2014     Imaging: No results found  Review of Systems:  Review of Systems   Constitutional: Negative for activity change, appetite change, fatigue and fever  HENT: Negative for nosebleeds and sore throat  Eyes: Negative for photophobia and visual disturbance  Respiratory: Negative for cough, chest tightness, shortness of breath and wheezing      Cardiovascular: Negative for chest pain, palpitations and leg swelling  Gastrointestinal: Negative for abdominal pain, diarrhea, nausea and vomiting  Endocrine: Negative for polyuria  Genitourinary: Negative for dysuria, frequency and hematuria  Musculoskeletal: Negative for arthralgias, back pain and gait problem  Skin: Negative for pallor and rash  Neurological: Negative for dizziness, syncope, speech difficulty and light-headedness  Hematological: Does not bruise/bleed easily  Psychiatric/Behavioral: Negative for agitation, behavioral problems and confusion  Physical Exam:  Physical Exam  Vitals reviewed  Constitutional:       General: He is not in acute distress  Appearance: He is well-developed  He is not diaphoretic  HENT:      Head: Normocephalic and atraumatic  Nose: Nose normal    Eyes:      General: No scleral icterus  Pupils: Pupils are equal, round, and reactive to light  Neck:      Vascular: No JVD  Cardiovascular:      Rate and Rhythm: Normal rate and regular rhythm  Heart sounds: S1 normal and S2 normal  Heart sounds not distant  No murmur heard  No systolic murmur is present  No friction rub  No gallop  No S3 sounds  Pulmonary:      Effort: Pulmonary effort is normal  No respiratory distress  Breath sounds: Normal breath sounds  No wheezing or rales  Abdominal:      General: Bowel sounds are normal  There is no distension  Palpations: Abdomen is soft  Musculoskeletal:         General: No deformity  Cervical back: Normal range of motion and neck supple  Skin:     General: Skin is warm and dry  Findings: No erythema  Neurological:      Mental Status: He is alert and oriented to person, place, and time  Cranial Nerves: No cranial nerve deficit  Psychiatric:         Behavior: Behavior normal        Blood pressure 124/68, pulse 68, height 5' 11" (1 803 m), weight 85 1 kg (187 lb 9 6 oz)      Discussion/Summary:  CAD: with EMILIA x 2 to LCx and LAD (staged) in the setting of STEMI on March 17, 2021  Currently symptom free and tolerating ASA, Brilinta, B-blocker, and statin  S/p cardiac rehab and did well, trying to stay active  HLD: continued on statin  LDL 38 in March 2021

## 2021-08-16 NOTE — PATIENT INSTRUCTIONS
Coronary Artery Disease   AMBULATORY CARE:   Coronary artery disease (CAD)  is narrowing of the arteries to your heart caused by a buildup of plaque  Plaque is made up of cholesterol and other substances  The narrowing in your arteries decreases the amount of blood that can flow to your heart  This causes your heart to get less oxygen  You may not have any symptoms of CAD  It is important for you to manage CAD even if you feel well  CAD can lead to a heart attack if it is not managed  Common symptoms include the following:   · Chest pain (angina), causing burning, squeezing, or crushing tightness in your chest    · Pain that spreads to your neck, jaw, or shoulder blade    · Nausea, vomiting, sweating, fainting, and hands and feet that are cold to the touch    Call 911 for any of the following:   · You have any of the following signs of a heart attack:      ? Squeezing, pressure, or pain in your chest    ? You may  also have any of the following:     § Discomfort or pain in your back, neck, jaw, stomach, or arm    § Shortness of breath    § Nausea or vomiting    § Lightheadedness or a sudden cold sweat      Contact your healthcare provider if:   · You have chest pain that is more frequent, or you have chest pain at rest     · You have questions or concerns about your condition or care  Medicines used to treat CAD:   · Blood pressure medicines  are given to lower your blood pressure  ACE inhibitors help keep your blood vessels relaxed and open to help keep blood flowing into your heart  Beta-blockers keep your heart pumping strongly and regularly so it does not work too hard to get oxygen  · Cholesterol medicines  help lower blood cholesterol levels  · Nitrates , such as nitroglycerin, relax the arteries of your heart so it gets more oxygen  They help to relieve your chest pain  · Antiplatelets , such as aspirin, help prevent blood clots  Take your antiplatelet medicine exactly as directed   These medicines make it more likely for you to bleed or bruise  If you are told to take aspirin, do not take acetaminophen or ibuprofen instead  · Blood thinners  keep clots from forming in your blood  Clots may cause heart attacks, strokes, or death  This medicine makes it more likely for you to bleed or bruise  · Do not take certain medicines without asking your healthcare provider first   These include NSAIDs, herbal or vitamin supplements, or hormones (estrogen or progestin)  Procedures used to treat CAD:   · Angioplasty  may be done to open an artery blocked by plaque  A tube with a balloon on the end is threaded into the blocked artery  Once the tube is in the artery, the balloon is inflated  As the balloon inflates, it presses the plaque against the artery wall to open the artery  A stent may be placed in your artery to keep it open  · Coronary artery bypass graft surgery (CABG)  is open heart surgery  Healthcare providers take arteries or veins from other areas in your body and use them to bypass or go around the blocked arteries of your heart  Cardiac rehabilitation:  Your healthcare provider may recommend that you attend cardiac rehabilitation (rehab)  This is a program run by specialists who will help you safely strengthen your heart and reduce the risk for more heart disease  The plan includes exercise, relaxation, stress management, and heart-healthy nutrition  Healthcare providers will also check to make sure any medicines you are taking are working  Manage CAD to prevent a heart attack:   · Do not smoke  Nicotine and other chemicals in cigarettes and cigars can cause heart and lung damage  Ask your healthcare provider for information if you currently smoke and need help to quit  E-cigarettes or smokeless tobacco still contain nicotine  Talk to your healthcare provider before you use these products  · Exercise regularly    Exercise at least 30 minutes each day, on most days of the week  Exercise helps to lower high cholesterol and high blood pressure  It can also help you maintain a healthy weight  Ask your healthcare provider about the kind of exercise you should do and how to get started  · Maintain a healthy weight  If you are overweight, talk to your healthcare provider about how to lose weight  A weight loss of 10% can improve your heart health  · Eat heart-healthy foods  Include fresh fruits and vegetables in your meal plan  Choose low-fat foods, such as skim or 1% fat milk, low-fat cheese and yogurt, fish, chicken (without skin), and lean meats  Eat two 4-ounce servings of fish high in omega-3 fats each week, such as salmon, fresh tuna, and herring  Do not eat foods that are high in sodium, such as canned foods, potato chips, salty snacks, and cold cuts  Put less table salt on your food  · Limit or do not drink alcohol  A drink of alcohol is 12 ounces of beer, 5 ounces of wine, or 1½ ounces of liquor  · Manage other health conditions  Follow your healthcare provider's advice on how to manage other conditions that can affect your heart health  These include diabetes, high blood pressure, and high cholesterol  You may need to take medicines for these conditions and make other lifestyle changes  · Ask if you should have a flu vaccine  The flu can be dangerous for a person who has CAD  The flu vaccine is available every year in the fall  Follow up with your healthcare provider as directed: You may need to return for other tests  You may also be referred to a cardiac surgeon  Write down your questions so you remember to ask them during your visits  © Copyright [employer] 2021 Information is for End User's use only and may not be sold, redistributed or otherwise used for commercial purposes   All illustrations and images included in CareNotes® are the copyrighted property of A D A M [employer] Inc  or Silvia Silverman   The above information is an educational aid only  It is not intended as medical advice for individual conditions or treatments  Talk to your doctor, nurse or pharmacist before following any medical regimen to see if it is safe and effective for you

## 2021-09-17 ENCOUNTER — APPOINTMENT (OUTPATIENT)
Dept: LAB | Facility: HOSPITAL | Age: 71
End: 2021-09-17
Payer: COMMERCIAL

## 2021-09-17 DIAGNOSIS — E78.5 HYPERLIPIDEMIA, UNSPECIFIED HYPERLIPIDEMIA TYPE: ICD-10-CM

## 2021-09-17 DIAGNOSIS — R73.01 IFG (IMPAIRED FASTING GLUCOSE): ICD-10-CM

## 2021-09-17 LAB
ALBUMIN SERPL BCP-MCNC: 3.8 G/DL (ref 3.5–5)
ALP SERPL-CCNC: 124 U/L (ref 46–116)
ALT SERPL W P-5'-P-CCNC: 40 U/L (ref 12–78)
ANION GAP SERPL CALCULATED.3IONS-SCNC: 6 MMOL/L (ref 4–13)
AST SERPL W P-5'-P-CCNC: 29 U/L (ref 5–45)
BILIRUB SERPL-MCNC: 0.85 MG/DL (ref 0.2–1)
BUN SERPL-MCNC: 21 MG/DL (ref 5–25)
CALCIUM SERPL-MCNC: 8.7 MG/DL (ref 8.3–10.1)
CHLORIDE SERPL-SCNC: 107 MMOL/L (ref 100–108)
CHOLEST SERPL-MCNC: 90 MG/DL (ref 50–200)
CO2 SERPL-SCNC: 28 MMOL/L (ref 21–32)
CREAT SERPL-MCNC: 0.81 MG/DL (ref 0.6–1.3)
EST. AVERAGE GLUCOSE BLD GHB EST-MCNC: 128 MG/DL
GFR SERPL CREATININE-BSD FRML MDRD: 91 ML/MIN/1.73SQ M
GLUCOSE P FAST SERPL-MCNC: 122 MG/DL (ref 65–99)
HBA1C MFR BLD: 6.1 %
HDLC SERPL-MCNC: 41 MG/DL
LDLC SERPL CALC-MCNC: 30 MG/DL (ref 0–100)
NONHDLC SERPL-MCNC: 49 MG/DL
POTASSIUM SERPL-SCNC: 4.7 MMOL/L (ref 3.5–5.3)
PROT SERPL-MCNC: 6.8 G/DL (ref 6.4–8.2)
SODIUM SERPL-SCNC: 141 MMOL/L (ref 136–145)
TRIGL SERPL-MCNC: 96 MG/DL

## 2021-09-17 PROCEDURE — 80053 COMPREHEN METABOLIC PANEL: CPT

## 2021-09-17 PROCEDURE — 80061 LIPID PANEL: CPT

## 2021-09-17 PROCEDURE — 83036 HEMOGLOBIN GLYCOSYLATED A1C: CPT

## 2021-09-17 PROCEDURE — 36415 COLL VENOUS BLD VENIPUNCTURE: CPT

## 2021-09-21 ENCOUNTER — OFFICE VISIT (OUTPATIENT)
Dept: FAMILY MEDICINE CLINIC | Facility: CLINIC | Age: 71
End: 2021-09-21
Payer: COMMERCIAL

## 2021-09-21 VITALS
HEART RATE: 64 BPM | BODY MASS INDEX: 26.01 KG/M2 | WEIGHT: 185.8 LBS | RESPIRATION RATE: 16 BRPM | OXYGEN SATURATION: 99 % | TEMPERATURE: 97.1 F | DIASTOLIC BLOOD PRESSURE: 60 MMHG | SYSTOLIC BLOOD PRESSURE: 120 MMHG | HEIGHT: 71 IN

## 2021-09-21 DIAGNOSIS — E78.2 MIXED HYPERLIPIDEMIA: ICD-10-CM

## 2021-09-21 DIAGNOSIS — Z00.00 ANNUAL PHYSICAL EXAM: Primary | ICD-10-CM

## 2021-09-21 DIAGNOSIS — Z13.6 ENCOUNTER FOR ABDOMINAL AORTIC ANEURYSM (AAA) SCREENING: ICD-10-CM

## 2021-09-21 DIAGNOSIS — R73.01 IFG (IMPAIRED FASTING GLUCOSE): ICD-10-CM

## 2021-09-21 DIAGNOSIS — I25.119 CORONARY ARTERY DISEASE INVOLVING NATIVE CORONARY ARTERY OF NATIVE HEART WITH ANGINA PECTORIS (HCC): ICD-10-CM

## 2021-09-21 PROCEDURE — 1160F RVW MEDS BY RX/DR IN RCRD: CPT | Performed by: FAMILY MEDICINE

## 2021-09-21 PROCEDURE — 99214 OFFICE O/P EST MOD 30 MIN: CPT | Performed by: FAMILY MEDICINE

## 2021-09-21 PROCEDURE — 3008F BODY MASS INDEX DOCD: CPT | Performed by: FAMILY MEDICINE

## 2021-09-21 PROCEDURE — 99397 PER PM REEVAL EST PAT 65+ YR: CPT | Performed by: FAMILY MEDICINE

## 2021-09-21 PROCEDURE — 1036F TOBACCO NON-USER: CPT | Performed by: FAMILY MEDICINE

## 2021-09-21 NOTE — ASSESSMENT & PLAN NOTE
Normal physical exam    Labs reviewed  Immunizations UTD  Colonoscopy normal 9/2012  Recall 9/2022  Pt is a nonsmoker but smoked on and off in college  AAA screening recommended

## 2021-09-21 NOTE — ASSESSMENT & PLAN NOTE
EMILIA placed in mid circumflex and LAD 3/15 and 3/17  Pt goes to Cardiology, Dr Danya Puckett  He is compliant with new meds including asa, brilinta bid, lipitor 80 and metoprolol succ 12 5 nightly

## 2021-09-21 NOTE — PROGRESS NOTES
[facility]    NAME: Chaka Espinoza  AGE: 73 y o  SEX: male  : 1950     DATE: 2021     Assessment and Plan:     Problem List Items Addressed This Visit        Other    Annual physical exam - Primary     Normal physical exam    Labs reviewed  Immunizations UTD  Colonoscopy normal 2012  Recall 2022  Pt is a nonsmoker but smoked on and off in college  AAA screening recommended  Other Visit Diagnoses     Encounter for abdominal aortic aneurysm (AAA) screening              Immunizations and preventive care screenings were discussed with patient today  Appropriate education was printed on patient's after visit summary  Will have flu shot in October  Counseling:  Alcohol/drug use: discussed moderation in alcohol intake, the recommendations for healthy alcohol use, and avoidance of illicit drug use  Dental Health: discussed importance of regular tooth brushing, flossing, and dental visits  · Exercise: the importance of regular exercise/physical activity was discussed  Recommend exercise 3-5 times per week for at least 30 minutes  No follow-ups on file  Chief Complaint:     Chief Complaint   Patient presents with    Physical Exam      History of Present Illness:     Adult Annual Physical   Patient here for a comprehensive physical exam  The patient reports no problems  Diet and Physical Activity  · Diet/Nutrition: well balanced diet  · Exercise: vigorous cardiovascular exercise  Depression Screening  PHQ-9 Depression Screening    PHQ-9:   Frequency of the following problems over the past two weeks:           General Health  · Sleep: sleeps well  · Hearing: no issues  · Vision: goes for regular eye exams and wears glasses  · Dental: no dental visits for >1 year          Health  · Symptoms include: none     Review of Systems:     Review of Systems   Constitutional: Negative for activity change, appetite change, fever and unexpected weight change  HENT: Negative for ear pain, postnasal drip and rhinorrhea  Eyes: Negative for photophobia and pain  Respiratory: Negative for cough, shortness of breath and wheezing  Cardiovascular: Negative for chest pain, palpitations and leg swelling  Gastrointestinal: Negative for abdominal pain, blood in stool, nausea and vomiting  Endocrine: Negative for polydipsia and polyuria  Genitourinary: Negative for difficulty urinating, hematuria and urgency  Musculoskeletal: Negative for myalgias  Skin: Negative for rash  Neurological: Negative for dizziness  Psychiatric/Behavioral: Negative for confusion and sleep disturbance        Past Medical History:     Past Medical History:   Diagnosis Date    Coronary artery disease     Femur fracture, right ([address] ) 2015    Hyperlipidemia       Past Surgical History:     Past Surgical History:   Procedure Laterality Date    CARDIAC CATHETERIZATION      CORONARY STENT PLACEMENT      TOTAL HIP ARTHROPLASTY        Family History:     Family History   Problem Relation Age of Onset    Heart disease Mother     Thyroid disease Mother     Hypertension Mother     Pancreatic cancer Father     Lung cancer Maternal Aunt       Social History:     Social History     Socioeconomic History    Marital status: /Civil Union     Spouse name: None    Number of children: 1    Years of education: None    Highest education level: None   Occupational History    Occupation: Retired    Tobacco Use    Smoking status: Never Smoker    Smokeless tobacco: Never Used   Vaping Use    Vaping Use: Never used   Substance and Sexual Activity    Alcohol use: Yes     Comment: occasionally    Drug use: No    Sexual activity: Yes   Other Topics Concern    None   Social History Narrative        Non smoker    No caffeine use    Weekly alcohol cosumption    No recreational drug use    Always wears seatbelts    Retired    Lives with wife    No living will    No Nondenominational preference     Social Determinants of Health     Financial Resource Strain: Low Risk     Difficulty of Paying Living Expenses: Not hard at all   Food Insecurity: No Food Insecurity    Worried About Running Out of Food in the Last Year: Never true    Darren of Food in the Last Year: Never true   Transportation Needs: No Transportation Needs    Lack of Transportation (Medical): No    Lack of Transportation (Non-Medical): No   Physical Activity: Sufficiently Active    Days of Exercise per Week: 7 days    Minutes of Exercise per Session: 80 min   Stress: No Stress Concern Present    Feeling of Stress : Not at all   Social Connections: Moderately Integrated    Frequency of Communication with Friends and Family: More than three times a week    Frequency of Social Gatherings with Friends and Family: More than three times a week    Attends [employer] Services: Never    Active Member of Clubs or Organizations:  Yes    Attends Club or Organization Meetings: Never    Marital Status:    Intimate Partner Violence: Not At Risk    Fear of Current or Ex-Partner: No    Emotionally Abused: No    Physically Abused: No    Sexually Abused: No      Current Medications:     Current Outpatient Medications   Medication Sig Dispense Refill    aspirin (ECOTRIN LOW STRENGTH) 81 mg EC tablet Take 1 tablet (81 mg total) by mouth daily 30 tablet 0    atorvastatin (LIPITOR) 80 mg tablet Take 1 tablet (80 mg total) by mouth daily with dinner 30 tablet 6    cholecalciferol (VITAMIN D3) 1,000 units tablet Take 1,000 Units by mouth daily      metoprolol succinate (TOPROL-XL) 25 mg 24 hr tablet TAKE 1 TABLET (25 MG TOTAL) BY MOUTH DAILY AT BEDTIME OR 1/2 TAB DAILY AT BEDTIME AS DIRECTED 90 tablet 1    ticagrelor (BRILINTA) 90 MG Take 1 tablet (90 mg total) by mouth 2 (two) times a day 60 tablet 10     No current facility-administered medications for this visit  Allergies:     No Known Allergies   Physical Exam:     /60 (BP Location: Left arm, Patient Position: Sitting, Cuff Size: Adult)   Pulse 64   Temp (!) 97 1 °F (36 2 °C) (Tympanic)   Resp 16   Ht 5' 11" (1 803 m)   Wt 84 3 kg (185 lb 12 8 oz)   SpO2 99%   BMI 25 91 kg/m²     Physical Exam  Constitutional:       General: He is not in acute distress  Appearance: He is well-developed  HENT:      Head: Normocephalic and atraumatic  Eyes:      General: No scleral icterus  Conjunctiva/sclera: Conjunctivae normal       Pupils: Pupils are equal, round, and reactive to light  Cardiovascular:      Rate and Rhythm: Normal rate and regular rhythm  Heart sounds: Normal heart sounds  No murmur heard  No friction rub  No gallop  Comments: Murmur over the aortic area when lying flat  Pulmonary:      Effort: Pulmonary effort is normal  No respiratory distress  Breath sounds: Normal breath sounds  No wheezing or rales  Abdominal:      General: Bowel sounds are normal  There is no distension  Palpations: Abdomen is soft  There is no mass  Tenderness: There is no abdominal tenderness  There is no guarding  Musculoskeletal:         General: No tenderness  Cervical back: Normal range of motion  Skin:     General: Skin is warm and dry  Findings: No rash  Neurological:      Mental Status: He is alert and oriented to person, place, and time  Cranial Nerves: No cranial nerve deficit  Motor: No abnormal muscle tone            Edith Nelson MD  [address] [place] [address]

## 2021-09-21 NOTE — PROGRESS NOTES
Assessment/Plan:    Annual physical exam  Normal physical exam    Labs reviewed  Immunizations UTD  Colonoscopy normal 9/2012  Recall 9/2022  Pt is a nonsmoker but smoked on and off in college  AAA screening recommended  Coronary artery disease involving native coronary artery of native heart with angina pectoris ([address] )  EMILIA placed in mid circumflex and LAD 3/15 and 3/17  Pt goes to Cardiology, Dr Magi Hollis  He is compliant with new meds including asa, brilinta bid, lipitor 80 and metoprolol succ 12 5 nightly  Hyperlipidemia  Well controlled on lipitor 80  IFG (impaired fasting glucose)  Improved  Recheck in 6m  Diagnoses and all orders for this visit:    Annual physical exam    Encounter for abdominal aortic aneurysm (AAA) screening  -     US abdominal aorta screening aaa; Future    Coronary artery disease involving native coronary artery of native heart with angina pectoris ([facility] [address] )  -     Comprehensive metabolic panel; Future    Mixed hyperlipidemia  -     Lipid panel; Future    IFG (impaired fasting glucose)  -     Hemoglobin A1C; Future          Subjective: lab review     Patient ID: Anna Kemp is a 73 y o  male  HPI  The CMP is normal except for elevated fasting sugar and mildly elevated alk phos  The lipid panel is WNL  The A1c is still prediabetic range but improved from 6 3 to 6 1  The following portions of the patient's history were reviewed and updated as appropriate: allergies, current medications, past family history, past medical history, past social history, past surgical history and problem list     Review of Systems   Constitutional: Negative for activity change, appetite change, fever and unexpected weight change  HENT: Negative for ear pain, postnasal drip and rhinorrhea  Eyes: Negative for photophobia and pain  Respiratory: Negative for cough, shortness of breath and wheezing  Cardiovascular: Negative for chest pain, palpitations and leg swelling  Gastrointestinal: Negative for abdominal pain, blood in stool, nausea and vomiting  Endocrine: Negative for polydipsia and polyuria  Genitourinary: Negative for difficulty urinating, hematuria and urgency  Musculoskeletal: Negative for myalgias  Skin: Negative for rash  Neurological: Negative for dizziness  Psychiatric/Behavioral: Negative for confusion and sleep disturbance  PHQ-9 Depression Screening    PHQ-9:   Frequency of the following problems over the past two weeks:               Objective:      /60 (BP Location: Left arm, Patient Position: Sitting, Cuff Size: Adult)   Pulse 64   Temp (!) 97 1 °F (36 2 °C) (Tympanic)   Resp 16   Ht 5' 11" (1 803 m)   Wt 84 3 kg (185 lb 12 8 oz)   SpO2 99%   BMI 25 91 kg/m²          Physical Exam  Constitutional:       General: He is not in acute distress  Appearance: He is well-developed  He is not diaphoretic  HENT:      Head: Normocephalic and atraumatic  Right Ear: External ear normal       Left Ear: External ear normal       Nose: Nose normal    Eyes:      Conjunctiva/sclera: Conjunctivae normal       Pupils: Pupils are equal, round, and reactive to light  Pulmonary:      Effort: Pulmonary effort is normal  No respiratory distress  Skin:     Findings: No rash  Neurological:      Mental Status: He is alert and oriented to person, place, and time  Psychiatric:         Behavior: Behavior normal          Recent Results (from the past 840 hour(s))   Hemoglobin A1C    Collection Time: 09/17/21  6:43 AM   Result Value Ref Range    Hemoglobin A1C 6 1 (H) Normal 3 8-5 6%; PreDiabetic 5 7-6 4%;  Diabetic >=6 5%; Glycemic control for adults with diabetes <7 0% %     mg/dl   Lipid panel    Collection Time: 09/17/21  6:53 AM   Result Value Ref Range    Cholesterol 90 50 - 200 mg/dL    Triglycerides 96 <=150 mg/dL    HDL, Direct 41 >=40 mg/dL    LDL Calculated 30 0 - 100 mg/dL    Non-HDL-Chol (CHOL-HDL) 49 mg/dl Comprehensive metabolic panel    Collection Time: 09/17/21  6:53 AM   Result Value Ref Range    Sodium 141 136 - 145 mmol/L    Potassium 4 7 3 5 - 5 3 mmol/L    Chloride 107 100 - 108 mmol/L    CO2 28 21 - 32 mmol/L    ANION GAP 6 4 - 13 mmol/L    BUN 21 5 - 25 mg/dL    Creatinine 0 81 0 60 - 1 30 mg/dL    Glucose, Fasting 122 (H) 65 - 99 mg/dL    Calcium 8 7 8 3 - 10 1 mg/dL    AST 29 5 - 45 U/L    ALT 40 12 - 78 U/L    Alkaline Phosphatase 124 (H) 46 - 116 U/L    Total Protein 6 8 6 4 - 8 2 g/dL    Albumin 3 8 3 5 - 5 0 g/dL    Total Bilirubin 0 85 0 20 - 1 00 mg/dL    eGFR 91 ml/min/1 73sq m

## 2021-10-21 ENCOUNTER — OFFICE VISIT (OUTPATIENT)
Dept: URGENT CARE | Age: 71
End: 2021-10-21
Payer: COMMERCIAL

## 2021-10-21 VITALS
OXYGEN SATURATION: 97 % | RESPIRATION RATE: 18 BRPM | TEMPERATURE: 99 F | HEART RATE: 100 BPM | SYSTOLIC BLOOD PRESSURE: 144 MMHG | DIASTOLIC BLOOD PRESSURE: 68 MMHG

## 2021-10-21 DIAGNOSIS — S50.862A TICK BITE OF LEFT FOREARM, INITIAL ENCOUNTER: Primary | ICD-10-CM
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
## 2021-10-21 PROCEDURE — S9083 URGENT CARE CENTER GLOBAL: HCPCS | Performed by: NURSE PRACTITIONER

## 2021-10-21 PROCEDURE — G0382 LEV 3 HOSP TYPE B ED VISIT: HCPCS | Performed by: NURSE PRACTITIONER

## 2021-10-21 RX ORDER — DOXYCYCLINE 100 MG/1
100 TABLET ORAL 2 TIMES DAILY
Qty: 28 TABLET | Refills: 0 | Status: SHIPPED | OUTPATIENT
Start: 2021-10-21 | End: 2021-11-04

## 2021-11-05 ENCOUNTER — HOSPITAL ENCOUNTER (OUTPATIENT)
Dept: RADIOLOGY | Facility: HOSPITAL | Age: 71
Discharge: HOME | End: 2021-11-05
Payer: COMMERCIAL

## 2021-11-05 DIAGNOSIS — Z13.6 ENCOUNTER FOR ABDOMINAL AORTIC ANEURYSM (AAA) SCREENING: ICD-10-CM

## 2021-11-05 PROCEDURE — 76706 US ABDL AORTA SCREEN AAA: CPT

## 2021-11-11 DIAGNOSIS — R07.9 CHEST PAIN: ICD-10-CM

## 2021-11-11 DIAGNOSIS — I21.4 NSTEMI (NON-ST ELEVATED MYOCARDIAL INFARCTION) (HCC): ICD-10-CM

## 2021-11-16 RX ORDER — ATORVASTATIN CALCIUM 80 MG/1
TABLET, FILM COATED ORAL
Qty: 90 TABLET | Refills: 2 | Status: SHIPPED | OUTPATIENT
Start: 2021-11-16 | End: 2022-02-17

## 2022-02-17 ENCOUNTER — OFFICE VISIT (OUTPATIENT)
Dept: CARDIOLOGY CLINIC | Facility: CLINIC | Age: 72
End: 2022-02-17
Payer: COMMERCIAL

## 2022-02-17 VITALS
OXYGEN SATURATION: 100 % | SYSTOLIC BLOOD PRESSURE: 112 MMHG | HEART RATE: 60 BPM | DIASTOLIC BLOOD PRESSURE: 64 MMHG | BODY MASS INDEX: 26.18 KG/M2 | WEIGHT: 187 LBS | HEIGHT: 71 IN

## 2022-02-17 DIAGNOSIS — E78.2 MIXED HYPERLIPIDEMIA: ICD-10-CM

## 2022-02-17 DIAGNOSIS — I21.4 NSTEMI (NON-ST ELEVATED MYOCARDIAL INFARCTION) (HCC): ICD-10-CM

## 2022-02-17 DIAGNOSIS — I25.119 CORONARY ARTERY DISEASE INVOLVING NATIVE CORONARY ARTERY OF NATIVE HEART WITH ANGINA PECTORIS (HCC): Primary | ICD-10-CM

## 2022-02-17 DIAGNOSIS — R07.9 CHEST PAIN: ICD-10-CM

## 2022-02-17 PROCEDURE — 1036F TOBACCO NON-USER: CPT | Performed by: INTERNAL MEDICINE

## 2022-02-17 PROCEDURE — 3008F BODY MASS INDEX DOCD: CPT | Performed by: INTERNAL MEDICINE

## 2022-02-17 PROCEDURE — 99214 OFFICE O/P EST MOD 30 MIN: CPT | Performed by: INTERNAL MEDICINE

## 2022-02-17 PROCEDURE — 1160F RVW MEDS BY RX/DR IN RCRD: CPT | Performed by: INTERNAL MEDICINE

## 2022-02-17 RX ORDER — ATORVASTATIN CALCIUM 40 MG/1
40 TABLET, FILM COATED ORAL
Qty: 90 TABLET | Refills: 3 | Status: SHIPPED | OUTPATIENT
Start: 2022-02-17

## 2022-02-17 NOTE — PROGRESS NOTES
Cardiology Follow Up    Staci Rod  1950  [id number]  [facility]  [address]  [address]  [phone]    1  Coronary artery disease involving native coronary artery of native heart with angina pectoris ([facility] )     2  Mixed hyperlipidemia     3  Chest pain     4  NSTEMI (non-ST elevated myocardial infarction) [facility])       Chief Complaint   Patient presents with    Follow-up     No cardiac complaints       Interval History: Patient feels well, without complaints  No reported chest pain, shortness of breath, palpitations, lightheadedness, syncope, LE edema, orthopnea, PND, or significant weight changes  Patient remains active without any increased fatigue out of the ordinary        Patient Active Problem List   Diagnosis    IFG (impaired fasting glucose)    Hyperlipidemia    Coronary artery disease involving native coronary artery of native heart with angina pectoris [facility])   [facility] discharge follow-up    Annual physical exam     Past Medical History:   Diagnosis Date    Coronary artery disease     Femur fracture, right ([facility] ) 2015    Hyperlipidemia      Social History     Socioeconomic History    Marital status: /Civil Union     Spouse name: Not on file    Number of children: 1    Years of education: Not on file    Highest education level: Not on file   Occupational History    Occupation: Retired    Tobacco Use    Smoking status: Never Smoker    Smokeless tobacco: Never Used   Vaping Use    Vaping Use: Never used   Substance and Sexual Activity    Alcohol use: Yes     Comment: occasionally    Drug use: No    Sexual activity: Yes   Other Topics Concern    Not on file   Social History Narrative        Non smoker    No caffeine use    Weekly alcohol cosumption    No recreational drug use    Always wears seatbelts    Retired    Lives with wife    No living will    No Sikhism preference     Social Determinants of Health     Financial Resource Strain: Low Risk     Difficulty of Paying Living Expenses: Not hard at all   Food Insecurity: No Food Insecurity    Worried About Running Out of Food in the Last Year: Never true    Darren of Food in the Last Year: Never true   Transportation Needs: No Transportation Needs    Lack of Transportation (Medical): No    Lack of Transportation (Non-Medical): No   Physical Activity: Sufficiently Active    Days of Exercise per Week: 7 days    Minutes of Exercise per Session: 80 min   Stress: No Stress Concern Present    Feeling of Stress : Not at all   Social Connections: Moderately Integrated    Frequency of Communication with Friends and Family: More than three times a week    Frequency of Social Gatherings with Friends and Family: More than three times a week    Attends Yazdanism Services: Never    Active Member of Clubs or Organizations:  Yes    Attends Club or Organization Meetings: Never    Marital Status:    Intimate Partner Violence: Not At Risk    Fear of Current or Ex-Partner: No    Emotionally Abused: No    Physically Abused: No    Sexually Abused: No   Housing Stability: Not on file      Family History   Problem Relation Age of Onset    Heart disease Mother     Thyroid disease Mother     Hypertension Mother     Pancreatic cancer Father     Lung cancer Maternal Aunt      Past Surgical History:   Procedure Laterality Date    CARDIAC CATHETERIZATION      CORONARY STENT PLACEMENT      TOTAL HIP ARTHROPLASTY         Current Outpatient Medications:     aspirin (ECOTRIN LOW STRENGTH) 81 mg EC tablet, Take 1 tablet (81 mg total) by mouth daily, Disp: 30 tablet, Rfl: 0    atorvastatin (LIPITOR) 80 mg tablet, TAKE 1 TABLET BY MOUTH DAILY WITH DINNER, Disp: 90 tablet, Rfl: 2    cholecalciferol (VITAMIN D3) 1,000 units tablet, Take 1,000 Units by mouth daily, Disp: , Rfl:     metoprolol succinate (TOPROL-XL) 25 mg 24 hr tablet, TAKE 1 TABLET (25 MG TOTAL) BY MOUTH DAILY AT BEDTIME OR 1/2 TAB DAILY AT BEDTIME AS DIRECTED, Disp: 90 tablet, Rfl: 1    ticagrelor (BRILINTA) 90 MG, Take 1 tablet (90 mg total) by mouth 2 (two) times a day, Disp: 60 tablet, Rfl: 10  No Known Allergies    Labs:  Appointment on 09/17/2021   Component Date Value    Cholesterol 09/17/2021 90     Triglycerides 09/17/2021 96     HDL, Direct 09/17/2021 41     LDL Calculated 09/17/2021 30     Non-HDL-Chol (CHOL-HDL) 09/17/2021 49     Hemoglobin A1C 09/17/2021 6 1*    EAG 09/17/2021 128     Sodium 09/17/2021 141     Potassium 09/17/2021 4 7     Chloride 09/17/2021 107     CO2 09/17/2021 28     ANION GAP 09/17/2021 6     BUN 09/17/2021 21     Creatinine 09/17/2021 0 81     Glucose, Fasting 09/17/2021 122*    Calcium 09/17/2021 8 7     AST 09/17/2021 29     ALT 09/17/2021 40     Alkaline Phosphatase 09/17/2021 124*    Total Protein 09/17/2021 6 8     Albumin 09/17/2021 3 8     Total Bilirubin 09/17/2021 0 85     eGFR 09/17/2021 91      Lab Results   Component Value Date    CHOL 150 11/18/2014    TRIG 96 09/17/2021    TRIG 97 11/18/2014    HDL 41 09/17/2021    HDL 53 11/18/2014    LDLDIRECT 90 11/18/2014     Imaging: No results found  Review of Systems:  Review of Systems   Constitutional: Negative for activity change, appetite change, fatigue and fever  HENT: Negative for nosebleeds and sore throat  Eyes: Negative for photophobia and visual disturbance  Respiratory: Negative for cough, chest tightness, shortness of breath and wheezing  Cardiovascular: Negative for chest pain, palpitations and leg swelling  Gastrointestinal: Negative for abdominal pain, diarrhea, nausea and vomiting  Endocrine: Negative for polyuria  Genitourinary: Negative for dysuria, frequency and hematuria  Musculoskeletal: Negative for arthralgias, back pain and gait problem  Skin: Negative for pallor and rash     Neurological: Negative for dizziness, syncope, speech difficulty and light-headedness  Hematological: Does not bruise/bleed easily  Psychiatric/Behavioral: Negative for agitation, behavioral problems and confusion  Physical Exam:  Physical Exam  Vitals reviewed  Constitutional:       General: He is not in acute distress  Appearance: He is well-developed  He is not diaphoretic  HENT:      Head: Normocephalic and atraumatic  Nose: Nose normal    Eyes:      General: No scleral icterus  Pupils: Pupils are equal, round, and reactive to light  Neck:      Vascular: No JVD  Cardiovascular:      Rate and Rhythm: Normal rate and regular rhythm  Heart sounds: S1 normal and S2 normal  Heart sounds not distant  No murmur heard  No systolic murmur is present  No friction rub  No gallop  No S3 sounds  Pulmonary:      Effort: Pulmonary effort is normal  No respiratory distress  Breath sounds: Normal breath sounds  No wheezing or rales  Abdominal:      General: Bowel sounds are normal  There is no distension  Palpations: Abdomen is soft  Musculoskeletal:         General: No deformity  Cervical back: Normal range of motion and neck supple  Skin:     General: Skin is warm and dry  Findings: No erythema  Neurological:      Mental Status: He is alert and oriented to person, place, and time  Cranial Nerves: No cranial nerve deficit  Psychiatric:         Behavior: Behavior normal        Blood pressure 112/64, pulse 60, height 5' 11" (1 803 m), weight 84 8 kg (187 lb), SpO2 100 %  Discussion/Summary:  CAD: with EMILIA x 2 to LCx and LAD (staged) in the setting of STEMI on March 17, 2021  Currently symptom free and tolerating ASA, Brilinta, B-blocker, and statin  S/p cardiac rehab and did well, trying to stay active  Advised that he can stop Brilinta after March 17, 2022, but that if he is tolerating this well, he can choose to continue as well  HLD: continued on statin  LDL 38 in March 2021 and 30 in Sept 2021 - will reduce Lipitor to 40mg since level is low

## 2022-02-17 NOTE — PATIENT INSTRUCTIONS
Coronary Artery Disease   AMBULATORY CARE:   Coronary artery disease (CAD)  is narrowing of the arteries to your heart caused by a buildup of plaque  Plaque is made up of cholesterol and other substances  The narrowing in your arteries decreases the amount of blood that can flow to your heart  This causes your heart to get less oxygen, which may be life-threatening  You may not have any symptoms of CAD  It is important for you to manage CAD even if you feel well  CAD can lead to a heart attack if it is not managed  Common symptoms include the following:   · Chest pain (angina), causing burning, squeezing, or crushing tightness in your chest    · Pain that spreads to your neck, jaw, or shoulder blade    · Nausea, vomiting, sweating, fainting, and hands and feet that are cold to the touch    Call your local emergency number (911 in the [place]), or have someone call if:   · You have any of the following signs of a heart attack:      ? Squeezing, pressure, or pain in your chest    ? You may  also have any of the following:     § Discomfort or pain in your back, neck, jaw, stomach, or arm    § Shortness of breath    § Nausea or vomiting    § Lightheadedness or a sudden cold sweat      Seek care immediately if:   · You have chest pain that happens often  · You have chest pain at rest     Call your doctor if:   · You have questions or concerns about your condition or care  Medicines used to treat CAD:  You may  need any of the following:  · Blood pressure medicines  are given to lower your blood pressure  These medicines may include ACE inhibitors and beta-blockers  ACE inhibitors help keep your blood vessels relaxed and open  This helps keep blood flowing into your heart  Beta-blockers keep your heart pumping strongly and regularly  This helps keep your heart from working too hard to get oxygen  · Cholesterol medicines  help lower blood cholesterol levels      · Nitrates , such as nitroglycerin, relax the arteries of your heart so it gets more oxygen  Nitrates may also help relieve your chest pain  · Diuretics  help your body get rid of extra fluid and protect your heart from more damage  You may urinate more often while you are taking diuretics  · Antiplatelets , such as aspirin, help prevent blood clots  Take your antiplatelet medicine exactly as directed  These medicines make it more likely for you to bleed or bruise  If you are told to take aspirin, do not take acetaminophen or ibuprofen instead  · Blood thinners  keep clots from forming in your blood  Clots may cause heart attacks, strokes, or death  This medicine makes it more likely for you to bleed or bruise  · Do not take certain medicines without asking your healthcare provider first   These include NSAIDs, herbal or vitamin supplements, or hormones (estrogen or progestin)  Procedures used to treat CAD:   · An angioplasty  may be done to open an artery blocked by plaque  A tube with a balloon on the end is put into the blocked artery  When the tube is in the artery, the balloon is inflated  As the balloon inflates, it presses the plaque against the artery wall to open the artery  A stent may be placed in your artery to keep it open  · Coronary artery bypass surgery (CABG)  is open heart surgery  Healthcare providers take arteries or veins from other areas in your body  These are used to bypass (go around) the blocked arteries of your heart  Cardiac rehabilitation (rehab)  is a program run by specialists who will help you safely strengthen your heart and reduce the risk for more heart disease  The plan includes exercise, relaxation, stress management, and heart-healthy nutrition  Healthcare providers will also check to make sure any medicines you are taking are working  Manage CAD to prevent a heart attack:   · Do not smoke  Nicotine and other chemicals in cigarettes and cigars can cause heart and lung damage   Ask your healthcare provider for information if you currently smoke and need help to quit  E-cigarettes or smokeless tobacco still contain nicotine  Talk to your healthcare provider before you use these products  · Be physically active  Physical activity, such as exercise, can lower your blood pressure, cholesterol, weight, and blood sugar levels  Healthcare providers will help you create physical activity goals  They can also help you make a plan to reach your goals  For example, you can break activity into 10-minute periods, 3 times in the day  Find activities you enjoy  This will make it easier for you to reach your goals  · Maintain a healthy weight  If you are overweight, talk to your healthcare provider about how to lose weight  A weight loss of 10% can improve your heart health  · Eat heart healthy foods  Include fresh fruits and vegetables in your meal plan  Choose low-fat foods, such as skim or 1% fat milk, low-fat cheese and yogurt, fish, chicken (without skin), and lean meats  Eat two 4-ounce servings of fish high in omega-3 fats each week, such as salmon, fresh tuna, and herring  Avoid foods that are high in sodium, such as canned foods, potato chips, salty snacks, and cold cuts  Put less table salt on your food  · Limit or do not drink alcohol  A drink of alcohol is 12 ounces of beer, 5 ounces of wine, or 1½ ounces of liquor  Your healthcare provider can tell you how many drinks are okay within 24 hours and within 1 week  · Manage other health conditions  Follow your healthcare provider's advice on how to manage other conditions that can affect your heart health  These include diabetes, high blood pressure, and high cholesterol  You may need to take medicines for these conditions and make other lifestyle changes  · Manage stress  Stress can raise your blood pressure  Find new ways to relax, such as deep breathing or listening to music  · Ask about vaccines you may need  Vaccines help prevent certain diseases that can be dangerous for a person with CAD  Get a flu vaccine as soon as recommended each year, usually in September or October  You may also need vaccines to prevent pneumonia or COVID-19  Your healthcare provider can tell you if you should get other vaccines, and when to get them  Follow up with your doctor as directed: You may need to return for other tests  You may also be referred to a cardiac surgeon  Write down your questions so you remember to ask them during your visits  © Copyright [employer] 2021 Information is for End User's use only and may not be sold, redistributed or otherwise used for commercial purposes  All illustrations and images included in CareNotes® are the copyrighted property of [employer]  or [facility] Juan Manuel Silverman   The above information is an  only  It is not intended as medical advice for individual conditions or treatments  Talk to your doctor, nurse or pharmacist before following any medical regimen to see if it is safe and effective for you

## 2022-03-17 ENCOUNTER — TELEPHONE (OUTPATIENT)
Dept: CARDIOLOGY CLINIC | Facility: CLINIC | Age: 72
End: 2022-03-17

## 2022-03-17 NOTE — TELEPHONE ENCOUNTER
He doesn't need to continue either one after today    If he is requesting staying on something, then yes, can be changed to plavix

## 2022-04-29 ENCOUNTER — HOSPITAL ENCOUNTER (EMERGENCY)
Facility: HOSPITAL | Age: 72
Discharge: HOME | End: 2022-04-29
Attending: EMERGENCY MEDICINE
Payer: COMMERCIAL

## 2022-04-29 ENCOUNTER — APPOINTMENT (EMERGENCY)
Dept: RADIOLOGY | Facility: HOSPITAL | Age: 72
End: 2022-04-29
Payer: COMMERCIAL

## 2022-04-29 VITALS
HEIGHT: 71 IN | DIASTOLIC BLOOD PRESSURE: 84 MMHG | OXYGEN SATURATION: 99 % | WEIGHT: 190 LBS | BODY MASS INDEX: 26.6 KG/M2 | HEART RATE: 75 BPM | TEMPERATURE: 97.9 F | RESPIRATION RATE: 18 BRPM | SYSTOLIC BLOOD PRESSURE: 165 MMHG

## 2022-04-29 DIAGNOSIS — M25.519 SHOULDER PAIN: ICD-10-CM

## 2022-04-29 DIAGNOSIS — S42.009A CLAVICULAR FRACTURE: Primary | ICD-10-CM

## 2022-04-29 PROCEDURE — 99284 EMERGENCY DEPT VISIT MOD MDM: CPT | Performed by: EMERGENCY MEDICINE

## 2022-04-29 PROCEDURE — 73000 X-RAY EXAM OF COLLAR BONE: CPT

## 2022-04-29 PROCEDURE — 73030 X-RAY EXAM OF SHOULDER: CPT

## 2022-04-29 PROCEDURE — 99283 EMERGENCY DEPT VISIT LOW MDM: CPT

## 2022-04-29 RX ORDER — METHOCARBAMOL 500 MG/1
500 TABLET, FILM COATED ORAL 2 TIMES DAILY
Qty: 20 TABLET | Refills: 0 | Status: SHIPPED | OUTPATIENT
Start: 2022-04-29

## 2022-04-29 RX ORDER — MORPHINE SULFATE 15 MG/1
7.5 TABLET ORAL EVERY 4 HOURS PRN
Qty: 5 TABLET | Refills: 0 | Status: SHIPPED | OUTPATIENT
Start: 2022-04-29

## 2022-04-29 NOTE — ED PROVIDER NOTES
History  Chief Complaint   Patient presents with    Shoulder Injury     tripped trying to catch dog, landing on right shoulder  reports clicking pain to right shoulder  73 y o M w/ no pertinent PMH presents with R shoulder pain  He was trying to catch his dog when he fell and landed on his right shoulder  Since then he has had pain and a clicking sensation with movement of the arm  He notes that he previously has fractured the clavicle on this side  He took Aleve with mild improvement in discomfort  He denies any numbness, tingling, weakness, or other subjective symptoms  He denies head strike during the fall, LOC, blood thinners  He has no other complaints at this time  Prior to Admission Medications   Prescriptions Last Dose Informant Patient Reported?  Taking?   aspirin (ECOTRIN LOW STRENGTH) 81 mg EC tablet  Self No No   Sig: Take 1 tablet (81 mg total) by mouth daily   atorvastatin (LIPITOR) 40 mg tablet   No No   Sig: Take 1 tablet (40 mg total) by mouth daily with dinner   cholecalciferol (VITAMIN D3) 1,000 units tablet  Self Yes No   Sig: Take 1,000 Units by mouth daily   metoprolol succinate (TOPROL-XL) 25 mg 24 hr tablet  Self No No   Sig: TAKE 1 TABLET (25 MG TOTAL) BY MOUTH DAILY AT BEDTIME OR 1/2 TAB DAILY AT BEDTIME AS DIRECTED   ticagrelor (BRILINTA) 90 MG  Self No No   Sig: Take 1 tablet (90 mg total) by mouth 2 (two) times a day      Facility-Administered Medications: None       Past Medical History:   Diagnosis Date    Coronary artery disease     [facility]ur fracture, right ([facility] ) 2015    Hyperlipidemia        Past Surgical History:   Procedure Laterality Date    CARDIAC CATHETERIZATION      CORONARY STENT PLACEMENT      TOTAL HIP ARTHROPLASTY         Family History   Problem Relation Age of Onset    Heart disease Mother     Thyroid disease Mother     Hypertension Mother     Pancreatic cancer Father     Lung cancer Maternal Aunt      I have reviewed and agree with the history as documented  E-Cigarette/Vaping    E-Cigarette Use Never User      E-Cigarette/Vaping Substances    Nicotine No     THC No     CBD No     Other No     Unknown No      Social History     Tobacco Use    Smoking status: Never Smoker    Smokeless tobacco: Never Used   Vaping Use    Vaping Use: Never used   Substance Use Topics    Alcohol use: Yes     Comment: occasionally    Drug use: No        Review of Systems   All other systems reviewed and are negative  Physical Exam  ED Triage Vitals   Temperature Pulse Respirations Blood Pressure SpO2   04/29/22 1829 04/29/22 1829 04/29/22 1829 04/29/22 1831 04/29/22 1829   97 9 °F (36 6 °C) 75 18 165/84 99 %      Temp src Heart Rate Source Patient Position - Orthostatic VS BP Location FiO2 (%)   -- -- -- -- --             Pain Score       04/29/22 1829       4             Orthostatic Vital Signs  Vitals:    04/29/22 1829 04/29/22 1831   BP:  165/84   Pulse: 75        Physical Exam  Vitals and nursing note reviewed  Constitutional:       Appearance: He is well-developed  HENT:      Head: Normocephalic and atraumatic  Right Ear: External ear normal       Left Ear: External ear normal       Nose: Nose normal    Eyes:      Conjunctiva/sclera: Conjunctivae normal    Cardiovascular:      Rate and Rhythm: Normal rate and regular rhythm  Pulses: Normal pulses  Heart sounds: No murmur heard  Pulmonary:      Effort: Pulmonary effort is normal  No respiratory distress  Breath sounds: Normal breath sounds  Abdominal:      Palpations: Abdomen is soft  Tenderness: There is no abdominal tenderness  Musculoskeletal:         General: Tenderness (Distal 2/3 of right clavicle) present  Cervical back: Neck supple  Comments: ROM intact with audible clicking sound on abduction at the shoulder  Skin:     General: Skin is warm and dry  Neurological:      General: No focal deficit present  Mental Status: He is alert  Sensory: No sensory deficit  Motor: No weakness  Psychiatric:         Mood and Affect: Mood normal          ED Medications  Medications - No data to display    Diagnostic Studies  Results Reviewed     None                 XR clavicle RIGHT   ED Interpretation by Marisel Leggett MD (04/29 2005)   Fracture at the distal end of the clavicle, as interpreted by me  Final Result by Franco Matute MD (04/30 1009)      Osseous remodeling at the junction of the middle and distal thirds of the clavicle, consistent with old trauma, without a definite acute fracture; although a minimally displaced fracture is difficult to exclude  Correlate for point tenderness  Normal glenohumeral joint  Workstation performed: [id number]         XR shoulder 2+ views RIGHT   ED Interpretation by Marisel Leggett MD (04/29 2006)   Normal shoulder, see clavicle for additional findings  Final Result by Franco Matute MD (04/30 1009)      Osseous remodeling at the junction of the middle and distal thirds of the clavicle, consistent with old trauma, without a definite acute fracture; although a minimally displaced fracture is difficult to exclude  Correlate for point tenderness  Normal glenohumeral joint  Workstation performed: [id number]               Procedures  Procedures      ED Course               Identification of Seniors at Risk      Most Recent Value   (ISAR) Identification of Seniors at Risk    Before the illness or injury that brought you to the Emergency, did you need someone to help you on a regular basis? 0 Filed at: 04/29/2022 1831   In the last 24 hours, have you needed more help than usual? 0 Filed at: 04/29/2022 1831   Have you been hospitalized for one or more nights during the past 6 months? 0 Filed at: 04/29/2022 1831   In general, do you see well? 0 Filed at: 04/29/2022 1831   In general, do you have serious problems with your memory?  0 Filed at: 04/29/2022 1831   Do you take more than three different medications every day? 1 Filed at: 04/29/2022 1831   ISAR Score 1 Filed at: 04/29/2022 1831                              [facility]  Number of Diagnoses or Management Options  Clavicular fracture  Shoulder pain  Diagnosis management comments: 70-year-old male with fall concerning for possible fracture of the shoulder or clavicle  Will obtain x-ray of clavicle and shoulder  Patient does not want any pain medication at this time  X-ray showing distal clavicular fracture  Placed patient in sling and provided with Orthopedics follow-up  Patient discharged with return precautions  Disposition  Final diagnoses:   Clavicular fracture   Shoulder pain     Time reflects when diagnosis was documented in both MDM as applicable and the Disposition within this note     Time User Action Codes Description Comment    4/29/2022  8:10 PM Beau Lund [[id number]] Clavicular fracture     4/29/2022  8:11 PM Beau Lund [[id number]] Shoulder pain       ED Disposition     ED Disposition Condition Date/Time Comment    Discharge Stable Fri Apr 29, 2022  8:10 PM Umair Byrd discharge to home/self care              Follow-up Information     Follow up With Specialties Details Why Contact Info Additional Information    [address] [facility]   [address] [id number]  [phone] [address] [place] [address], [address]  [facility]  Use Entrance A           Discharge Medication List as of 4/29/2022  8:21 PM      START taking these medications    Details   methocarbamol (ROBAXIN) 500 mg tablet Take 1 tablet (500 mg total) by mouth 2 (two) times a day, Starting Fri 4/29/2022, Normal      morphine (MSIR) 15 mg tablet Take 0 5 tablets (7 5 mg total) by mouth every 4 (four) hours as needed for severe pain Max Daily Amount: 45 mg, Starting Fri 4/29/2022, Print CONTINUE these medications which have NOT CHANGED    Details   aspirin (ECOTRIN LOW STRENGTH) 81 mg EC tablet Take 1 tablet (81 mg total) by mouth daily, Starting Fri 3/19/2021, Normal      atorvastatin (LIPITOR) 40 mg tablet Take 1 tablet (40 mg total) by mouth daily with dinner, Starting Thu 2/17/2022, Normal      cholecalciferol (VITAMIN D3) 1,000 units tablet Take 1,000 Units by mouth daily, Historical Med      metoprolol succinate (TOPROL-XL) 25 mg 24 hr tablet TAKE 1 TABLET (25 MG TOTAL) BY MOUTH DAILY AT BEDTIME OR 1/2 TAB DAILY AT BEDTIME AS DIRECTED, Normal      ticagrelor (BRILINTA) 90 MG Take 1 tablet (90 mg total) by mouth 2 (two) times a day, Starting Wed 4/7/2021, Normal               PDMP Review     None           ED Provider  Attending physically available and evaluated Sushma Chairez  SILVIANO managed the patient along with the ED Attending      Electronically Signed by         Lila Guillen MD  05/01/22 1811

## 2022-04-30 NOTE — DISCHARGE INSTRUCTIONS
Thank you for coming to the ED for your care  You have fracture the end of your clavicle  We recommend wearing a splint to help in the healing process  We recommend following up with the orthopedic doctors and have sent a referral to their office  If they do not give you a call on Monday then we recommend calling their office for an appointment  Please return to the ED if you develop any further concerning symptoms

## 2022-04-30 NOTE — ED ATTENDING ATTESTATION
4/29/2022  IGrant MD, saw and evaluated the patient  I have discussed the patient with the resident/non-physician practitioner and agree with the resident's/non-physician practitioner's findings, Plan of Care, and MDM as documented in the resident's/non-physician practitioner's note, except where noted  All available labs and Radiology studies were reviewed  I was present for key portions of any procedure(s) performed by the resident/non-physician practitioner and I was immediately available to provide assistance  At this point I agree with the current assessment done in the Emergency Department  I have conducted an independent evaluation of this patient a history and physical is as follows: This is a 73 y o  old male who presents to the ED for evaluation of shoulder pain  Patient fell when he was outside using his dog  Landed on his right shoulder  Has loud audible click when he moves his shoulder  Pain is okay to remove it a 10 10  No medications  Patient appears well nourished, normally developed, no acute distress  Vital signs as documented  Head exam is atraumatic  Pupils EOMI, no scleral icterus or corneal injection noted  Neck is supple without JVD  Respirations are normal without increase work of breathing or audible noises  Cardiac exam shows regular rate and rhythm  Patient is moving all extremities equally, with exception of Dec ROM of the R shoulder due to pain, there is a click at the end of the clavicle with movement and he is tender at the end  Able to ambulate with normal gait under own power  Patient is awake, alert, oriented ×4 without focal neurologic deficit  Skin is warm, dry, intact without rash  A/P: This is a 73 y o  male who presents to the ED for evaluation of right shoulder pain  Suspect clavicle injury versus internal derangement the shoulder  X-ray  Re-evaluate disposition accordingly      I personally conducted a search of the [place] prescription drug monitoring program   No signs of suspicious behavior was found  The patient will be provided with a prescription for controlled substance for outpatient management  I printed the RX with direction to ONLY fill if needed due to severe pain and difficulty sleeping  I personally counseled the patient about appropriate use of these medications including but not limited to operating motor vehicles within 6 hours of taking a pill  Patient acknowledged receipt of these medication precautions      ED Course       Critical Care Time  Procedures

## 2022-05-05 ENCOUNTER — OFFICE VISIT (OUTPATIENT)
Dept: OBGYN CLINIC | Facility: OTHER | Age: 72
End: 2022-05-05
Payer: COMMERCIAL

## 2022-05-05 VITALS
HEIGHT: 71 IN | SYSTOLIC BLOOD PRESSURE: 127 MMHG | HEART RATE: 60 BPM | DIASTOLIC BLOOD PRESSURE: 64 MMHG | WEIGHT: 190 LBS | BODY MASS INDEX: 26.6 KG/M2

## 2022-05-05 DIAGNOSIS — S42.031A CLOSED DISPLACED FRACTURE OF ACROMIAL END OF RIGHT CLAVICLE, INITIAL ENCOUNTER: ICD-10-CM

## 2022-05-05 PROCEDURE — 1036F TOBACCO NON-USER: CPT | Performed by: ORTHOPAEDIC SURGERY

## 2022-05-05 PROCEDURE — 1160F RVW MEDS BY RX/DR IN RCRD: CPT | Performed by: ORTHOPAEDIC SURGERY

## 2022-05-05 PROCEDURE — 99204 OFFICE O/P NEW MOD 45 MIN: CPT | Performed by: ORTHOPAEDIC SURGERY

## 2022-05-05 PROCEDURE — 23500 CLTX CLAVICULAR FX W/O MNPJ: CPT | Performed by: ORTHOPAEDIC SURGERY

## 2022-05-05 PROCEDURE — 3008F BODY MASS INDEX DOCD: CPT | Performed by: ORTHOPAEDIC SURGERY

## 2022-05-05 NOTE — PROGRESS NOTES
Assessment  Diagnoses and all orders for this visit:    Closed displaced fracture of acromial end of right clavicle, initial encounter        Discussion and Plan:    · Continue sling for 4 weeks   · Remove sling to perform pendulums along with elbow, wrist, and hand ROM   · We will evaluate the need for formal PT if needed at the next visit  Explained most patients do not need formal PT if they remove the sling for exercises  · Follow up in 4 weeks with clavicle x-rays      Subjective:   Patient ID: Helen Crespo is a 73 y o  male      HPI  The patient presents with a chief complaint of right shoulder pain  The pain began 1 week(s) ago and is associated with an acute injury  Patient reports a trip and fall 4/29/22 [place]  The patient describes the pain as sharp in intensity,  intermittent in timing, and localizes the pain to the  right distal clavicle  The pain is worse with movement and relieved by rest   The pain is not associated with numbness and tingling  The pain is not associated with constitutional symptoms  The patient is not awoken at night by the pain  The patient was seen in the ED  He was placed in a sling and referred here today  The following portions of the patient's history were reviewed and updated as appropriate: allergies, current medications, past family history, past medical history, past social history, past surgical history and problem list     Review of Systems   Constitutional: Negative for chills and fever  HENT: Negative for drooling and hearing loss  Eyes: Negative for visual disturbance  Respiratory: Negative for cough and shortness of breath  Cardiovascular: Negative for chest pain  Gastrointestinal: Negative for abdominal pain  Skin: Negative for rash  Psychiatric/Behavioral: Negative for agitation         Objective:  /64 (BP Location: Left arm, Patient Position: Sitting, Cuff Size: Adult)   Pulse 60   Ht 5' 11" (1 803 m)   Wt 86 2 kg (190 lb)   BMI 26 50 kg/m²       Right Shoulder Exam     Tenderness   The patient is experiencing tenderness in the clavicle (at the fracture site)  Other   Erythema: absent  Sensation: normal  Pulse: present    Comments:    Mild swelling  Moderate ecchymosis about the fracture site  ROM and strength not tested secondary to fracture            Physical Exam  Vitals reviewed  Constitutional:       Appearance: He is well-developed  HENT:      Head: Normocephalic  Eyes:      Pupils: Pupils are equal, round, and reactive to light  Pulmonary:      Effort: Pulmonary effort is normal    Skin:     General: Skin is warm and dry  I have personally reviewed pertinent films in PACS and my interpretation is as follows  Right clavicle x-rays demonstrates mildly displaced distal clavicle fracture in acceptable alignment and position      Fracture / Dislocation Treatment    Date/Time: 5/5/2022 12:16 PM  Performed by: Arnol Gore MD  Authorized by: Arnol Gore MD     Injury location:  Sternoclavicular  Location details:  Right clavicle  Injury type:  Fracture  Manipulation performed?: No            Scribe Attestation    I,:  Grazyna Phelps am acting as a scribe while in the presence of the attending physician :       I,:  Arnol Gore MD personally performed the services described in this documentation    as scribed in my presence :

## 2022-05-15 ENCOUNTER — ANESTHESIA (OUTPATIENT)
Dept: ANESTHESIOLOGY | Facility: HOSPITAL | Age: 72
End: 2022-05-15

## 2022-05-15 ENCOUNTER — ANESTHESIA EVENT (OUTPATIENT)
Dept: ANESTHESIOLOGY | Facility: HOSPITAL | Age: 72
End: 2022-05-15

## 2022-05-16 ENCOUNTER — HOSPITAL ENCOUNTER (OUTPATIENT)
Dept: GASTROENTEROLOGY | Facility: HOSPITAL | Age: 72
Setting detail: OUTPATIENT SURGERY
Discharge: HOME | End: 2022-05-16
Attending: COLON & RECTAL SURGERY
Payer: COMMERCIAL

## 2022-05-16 ENCOUNTER — ANESTHESIA EVENT (OUTPATIENT)
Dept: GASTROENTEROLOGY | Facility: HOSPITAL | Age: 72
End: 2022-05-16

## 2022-05-16 ENCOUNTER — ANESTHESIA (OUTPATIENT)
Dept: GASTROENTEROLOGY | Facility: HOSPITAL | Age: 72
End: 2022-05-16

## 2022-05-16 VITALS
DIASTOLIC BLOOD PRESSURE: 69 MMHG | RESPIRATION RATE: 20 BRPM | HEART RATE: 66 BPM | SYSTOLIC BLOOD PRESSURE: 152 MMHG | TEMPERATURE: 97.6 F | OXYGEN SATURATION: 97 %

## 2022-05-16 DIAGNOSIS — Z12.11 COLON CANCER SCREENING: ICD-10-CM

## 2022-05-16 PROCEDURE — G0121 COLON CA SCRN NOT HI RSK IND: HCPCS | Performed by: COLON & RECTAL SURGERY

## 2022-05-16 PROCEDURE — 99203 OFFICE O/P NEW LOW 30 MIN: CPT | Performed by: COLON & RECTAL SURGERY

## 2022-05-16 RX ORDER — PROPOFOL 10 MG/ML
INJECTION, EMULSION INTRAVENOUS AS NEEDED
Status: DISCONTINUED | OUTPATIENT
Start: 2022-05-16 | End: 2022-05-16

## 2022-05-16 RX ORDER — SODIUM CHLORIDE 9 MG/ML
INJECTION, SOLUTION INTRAVENOUS CONTINUOUS PRN
Status: DISCONTINUED | OUTPATIENT
Start: 2022-05-16 | End: 2022-05-16

## 2022-05-16 RX ORDER — PROPOFOL 10 MG/ML
INJECTION, EMULSION INTRAVENOUS CONTINUOUS PRN
Status: DISCONTINUED | OUTPATIENT
Start: 2022-05-16 | End: 2022-05-16

## 2022-05-16 RX ORDER — EPHEDRINE SULFATE 50 MG/ML
INJECTION INTRAVENOUS AS NEEDED
Status: DISCONTINUED | OUTPATIENT
Start: 2022-05-16 | End: 2022-05-16

## 2022-05-16 RX ADMIN — PROPOFOL 70 MG: 10 INJECTION, EMULSION INTRAVENOUS at 10:27

## 2022-05-16 RX ADMIN — SODIUM CHLORIDE: 900 INJECTION, SOLUTION INTRAVENOUS at 10:21

## 2022-05-16 RX ADMIN — SODIUM CHLORIDE: 900 INJECTION, SOLUTION INTRAVENOUS at 10:39

## 2022-05-16 RX ADMIN — EPHEDRINE SULFATE 5 MG: 50 INJECTION INTRAVENOUS at 10:29

## 2022-05-16 RX ADMIN — PROPOFOL 140 MCG/KG/MIN: 10 INJECTION, EMULSION INTRAVENOUS at 10:27

## 2022-05-16 NOTE — ANESTHESIA PREPROCEDURE EVALUATION
Procedure:  COLONOSCOPY    Relevant Problems   ANESTHESIA (within normal limits)      CARDIO   (+) Coronary artery disease involving native coronary artery of native heart with angina pectoris (HCC)   (+) Hyperlipidemia   (+) Murmur      ENDO (within normal limits)      GI/HEPATIC (within normal limits)      /RENAL (within normal limits)      HEMATOLOGY (within normal limits)      NEURO/PSYCH (within normal limits)      PULMONARY (within normal limits)      Cardiac Cath 3/17/2021:  Mid LAD: There was a 85 % stenosis      1ST LESION INTERVENTIONS:  A successful balloon angioplasty with stent and balloon angioplasty procedure was performed on the 85 % lesion in the mid LAD  Following intervention there was a 0 % residual stenosis  A drug-eluting stent was placed across the lesion and deployed at a maximum inflation pressure of 14 joycelyn      2ND LESION INTERVENTIONS:  A balloon angioplasty procedure was performed on the 100 % lesion post stenting from plaque shift in the 2nd diagonal  Following intervention there was a 0 % residual stenosis after 2mm balloon inflation  TTE 3/16/2021:  LEFT VENTRICLE:  Systolic function was normal by visual assessment  Ejection fraction was estimated to be 60 %  There were no regional wall motion abnormalities  Wall thickness was mildly increased  Mass was normal  The changes were consistent with concentric remodeling (increased wall thickness with normal wall mass)  Doppler parameters were consistent with abnormal left ventricular relaxation (grade 1 diastolic dysfunction)      PULMONIC VALVE:  There was trace regurgitation      AORTA:  There was mild dilatation of the ascending aorta  Cardiac Cath 3/15/2021:  CORONARY CIRCULATION:  There was 2-vessel coronary artery disease  Mid LAD: There was a 100 % stenosis  1st diagonal: There was a 50 % stenosis at the ostium of the vessel segment  Ostial circumflex: There was a 55 % stenosis  Mid circumflex:  There was a 100 % stenosis      1ST LESION INTERVENTIONS:  A balloon angioplasty procedure was performed on the lesion in the mid circumflex  A Resolute Effie Rx 2 5 x 22mm drug-eluting stent was placed across the lesion and deployed at a maximum inflation pressure of 12 joycelyn  Lab Results   Component Value Date    WBC 7 87 03/25/2021    HGB 14 4 03/25/2021    HCT 43 7 03/25/2021    MCV 89 03/25/2021     03/25/2021     Lab Results   Component Value Date    SODIUM 141 09/17/2021    K 4 7 09/17/2021     09/17/2021    CO2 28 09/17/2021    BUN 21 09/17/2021    CREATININE 0 81 09/17/2021    GLUC 130 03/18/2021    CALCIUM 8 7 09/17/2021     Lab Results   Component Value Date    INR 1 09 03/15/2021    INR 1 05 08/02/2015    PROTIME 13 9 03/15/2021    PROTIME 13 5 08/02/2015     Lab Results   Component Value Date    HGBA1C 6 1 (H) 09/17/2021            Physical Exam    Airway    Mallampati score: II  TM Distance: >3 FB  Neck ROM: full     Dental       Cardiovascular  Cardiovascular exam normal    Pulmonary  Pulmonary exam normal     Other Findings        Anesthesia Plan  ASA Score- 3     Anesthesia Type- IV sedation with anesthesia with ASA Monitors  Additional Monitors:   Airway Plan:           Plan Factors-Exercise tolerance (METS): >4 METS  Chart reviewed  EKG reviewed  Existing labs reviewed  Patient summary reviewed  Induction- intravenous  Postoperative Plan-     Informed Consent- Anesthetic plan and risks discussed with patient  I personally reviewed this patient with the CRNA  Discussed and agreed on the Anesthesia Plan with the CRNA  Haydee Germain

## 2022-05-16 NOTE — H&P
History and Physical   Colon and Rectal Surgery   Sandra Manrique 81 y o  male MRN: [id number]  Unit/Bed#:  Encounter: [id number]  05/16/22   @NOW    No chief complaint on file  History of Present Illness   HPI:  Sandra Manrique is a 81 y o  male who presents for screening colonoscopy  Last exam was 10 years        Historical Information   Past Medical History:   Diagnosis Date    Coronary artery disease     Femur fracture, right ([address] ) 2015    Hyperlipidemia      Past Surgical History:   Procedure Laterality Date    CARDIAC CATHETERIZATION      CORONARY STENT PLACEMENT      TOTAL HIP ARTHROPLASTY         Meds/Allergies     (Not in a hospital admission)        Current Outpatient Medications:     aspirin (ECOTRIN LOW STRENGTH) 81 mg EC tablet, Take 1 tablet (81 mg total) by mouth daily, Disp: 30 tablet, Rfl: 0    atorvastatin (LIPITOR) 40 mg tablet, Take 1 tablet (40 mg total) by mouth daily with dinner, Disp: 90 tablet, Rfl: 3    cholecalciferol (VITAMIN D3) 1,000 units tablet, Take 1,000 Units by mouth daily, Disp: , Rfl:     methocarbamol (ROBAXIN) 500 mg tablet, Take 1 tablet (500 mg total) by mouth 2 (two) times a day, Disp: 20 tablet, Rfl: 0    metoprolol succinate (TOPROL-XL) 25 mg 24 hr tablet, TAKE 1 TABLET (25 MG TOTAL) BY MOUTH DAILY AT BEDTIME OR 1/2 TAB DAILY AT BEDTIME AS DIRECTED, Disp: 90 tablet, Rfl: 1    morphine (MSIR) 15 mg tablet, Take 0 5 tablets (7 5 mg total) by mouth every 4 (four) hours as needed for severe pain Max Daily Amount: 45 mg, Disp: 5 tablet, Rfl: 0    No Known Allergies      Social History   Social History     Substance and Sexual Activity   Alcohol Use Yes    Comment: occasionally     Social History     Substance and Sexual Activity   Drug Use No     Social History     Tobacco Use   Smoking Status Never Smoker   Smokeless Tobacco Never Used         Family History:   Family History   Problem Relation Age of Onset    Heart disease Mother     Thyroid disease Mother     Hypertension Mother     Pancreatic cancer Father     Lung cancer Maternal Aunt          Objective     Current Vitals:      No intake or output data in the 24 hours ending 05/16/22 1440    Physical Exam:  General:  Resting comfortably in bed   Eyes:Sclera anicteric  ENT: Trachea midline  Pulm:  Symmetric chest raise  No respiratory Distress  CV:  Regular on monitor  Abdomen:  Soft NT ND  Extremities:  No clubbing/ cyanosis/ edema    Lab Results: I have personally reviewed pertinent lab results  Imaging: I have personally reviewed pertinent reports  ASSESSMENT:  Rosa Buck is a 81 y o  male who presents for outpatient colonoscopy  PLAN:  For colonoscopy    Risks/ Benefits reviewed to include but not limited to anesthesia, bleeding, missed lesions, and colonoscopic perforation requiring surgery

## 2022-05-16 NOTE — ANESTHESIA POSTPROCEDURE EVALUATION
Post-Op Assessment Note    CV Status:  Stable    Pain management: adequate     Mental Status:  Alert and awake   Hydration Status:  Euvolemic   PONV Controlled:  Controlled   Airway Patency:  Patent      Post Op Vitals Reviewed: Yes            No complications documented      BP   118/59   Temp      Pulse     Resp   14   SpO2   95

## 2022-05-25 DIAGNOSIS — S42.031A CLOSED DISPLACED FRACTURE OF ACROMIAL END OF RIGHT CLAVICLE, INITIAL ENCOUNTER: Primary | ICD-10-CM

## 2022-06-02 ENCOUNTER — VBI (OUTPATIENT)
Dept: ADMINISTRATIVE | Facility: OTHER | Age: 72
End: 2022-06-02

## 2022-06-09 ENCOUNTER — APPOINTMENT (OUTPATIENT)
Dept: RADIOLOGY | Facility: OTHER | Age: 72
End: 2022-06-09
Payer: COMMERCIAL

## 2022-06-09 ENCOUNTER — OFFICE VISIT (OUTPATIENT)
Dept: OBGYN CLINIC | Facility: OTHER | Age: 72
End: 2022-06-09

## 2022-06-09 VITALS
HEIGHT: 71 IN | HEART RATE: 75 BPM | WEIGHT: 188.4 LBS | DIASTOLIC BLOOD PRESSURE: 71 MMHG | BODY MASS INDEX: 26.38 KG/M2 | SYSTOLIC BLOOD PRESSURE: 113 MMHG

## 2022-06-09 DIAGNOSIS — S42.031A CLOSED DISPLACED FRACTURE OF ACROMIAL END OF RIGHT CLAVICLE, INITIAL ENCOUNTER: ICD-10-CM

## 2022-06-09 DIAGNOSIS — S42.031D CLOSED DISPLACED FRACTURE OF ACROMIAL END OF RIGHT CLAVICLE WITH ROUTINE HEALING, SUBSEQUENT ENCOUNTER: Primary | ICD-10-CM

## 2022-06-09 PROCEDURE — 99024 POSTOP FOLLOW-UP VISIT: CPT | Performed by: ORTHOPAEDIC SURGERY

## 2022-06-09 PROCEDURE — 73000 X-RAY EXAM OF COLLAR BONE: CPT

## 2022-06-09 PROCEDURE — 3008F BODY MASS INDEX DOCD: CPT | Performed by: ORTHOPAEDIC SURGERY

## 2022-06-09 NOTE — PROGRESS NOTES
Assessment  Diagnoses and all orders for this visit:    Closed displaced fracture of acromial end of right clavicle with routine healing, subsequent encounter    Discussion and Plan:    · Patient was instructed to discontinue use of sling at this time  · He is to perform home exercise program and daily activities to his tolerance with modifications to avoid pain  · Follow up in 2 months with repeat x rays of the right clavicle at this time    Subjective:   Patient ID: Sandra Manrique is a 81 y o  male    82 y/o male who presents today for a follow up visit for his right clavicle  Patient sustained an injury on 4/29/2022 after a trip and fall while walking his dog  Today, patient reports overall improving symptoms/pain about his right clavicle and shoulder  He does report a mild "dull, aching" sensation about the shoulder with certain motions but this is improving as well  No numbness or tingling  No fevers or chills  The following portions of the patient's history were reviewed and updated as appropriate: allergies, current medications, past family history, past medical history, past social history, past surgical history and problem list     Objective:  /71   Pulse 75   Ht 5' 11" (1 803 m)   Wt 85 5 kg (188 lb 6 4 oz)   BMI 26 28 kg/m²       Right Shoulder Exam     Tenderness   Right shoulder tenderness location: Non tender to palpation about the fracture site  Range of Motion   External rotation: normal   Forward flexion: 140     Muscle Strength   Abduction: 5/5     Other   Erythema: absent  Sensation: normal  Pulse: present            Physical Exam  Constitutional:       General: He is not in acute distress  Appearance: He is well-developed  Eyes:      Conjunctiva/sclera: Conjunctivae normal       Pupils: Pupils are equal, round, and reactive to light  Cardiovascular:      Rate and Rhythm: Normal rate and regular rhythm     Pulmonary:      Effort: Pulmonary effort is normal  Breath sounds: Normal breath sounds  Abdominal:      General: Bowel sounds are normal       Palpations: Abdomen is soft  Musculoskeletal:      Cervical back: Normal range of motion and neck supple  Skin:     General: Skin is warm and dry  Findings: No erythema or rash  Neurological:      Mental Status: He is alert and oriented to person, place, and time  Deep Tendon Reflexes: Reflexes are normal and symmetric  Psychiatric:         Behavior: Behavior normal          I have personally reviewed pertinent films in PACS and my interpretation is as follows  X Ray Right Clavicle 6/9/2022: Minimally displaced distal clavicle fracture that remains in acceptable alignment and position with signs of callus/healing formation present      Scribe Attestation    I,:  Leo Cruz am acting as a scribe while in the presence of the attending physician :       I,:  Chalice Meckel, MD personally performed the services described in this documentation    as scribed in my presence :

## 2022-08-15 ENCOUNTER — OFFICE VISIT (OUTPATIENT)
Dept: OBGYN CLINIC | Facility: OTHER | Age: 72
End: 2022-08-15
Payer: MEDICARE

## 2022-08-15 ENCOUNTER — APPOINTMENT (OUTPATIENT)
Dept: RADIOLOGY | Facility: OTHER | Age: 72
End: 2022-08-15
Payer: MEDICARE

## 2022-08-15 VITALS — WEIGHT: 187.5 LBS | BODY MASS INDEX: 26.25 KG/M2 | HEIGHT: 71 IN

## 2022-08-15 DIAGNOSIS — S42.031D CLOSED DISPLACED FRACTURE OF ACROMIAL END OF RIGHT CLAVICLE WITH ROUTINE HEALING, SUBSEQUENT ENCOUNTER: ICD-10-CM

## 2022-08-15 DIAGNOSIS — S42.031D CLOSED DISPLACED FRACTURE OF ACROMIAL END OF RIGHT CLAVICLE WITH ROUTINE HEALING, SUBSEQUENT ENCOUNTER: Primary | ICD-10-CM

## 2022-08-15 PROCEDURE — 99213 OFFICE O/P EST LOW 20 MIN: CPT | Performed by: ORTHOPAEDIC SURGERY

## 2022-08-15 PROCEDURE — 73000 X-RAY EXAM OF COLLAR BONE: CPT

## 2022-08-15 NOTE — PROGRESS NOTES
Assessment  Diagnoses and all orders for this visit:    Closed displaced fracture of acromial end of right clavicle with routine healing, subsequent encounter    Discussion and Plan:    · Patient is now 3 5 months s/p distal; clavicle fracture sustained on 4/29/2022, doing well  · Patient may continue to perform HEP as tolerated with modifications to avoid pain  · Follow up on an as needed basis    Subjective:   Patient ID: Trixie Cooney is a 81 y o  male    80 y/o male who presents today for a follow up visit for his right clavicle  Patient is now 3 5 months s/p right distal clavicle fracture sustained on 4/29/2022  Patient reports that he is doing well  He only reports an intermittent "dull, aching" sensation about the clavicle but this is not present on a daily basis  He is riding his bike again without complications  No numbness or tingling  No fevers or chills  The following portions of the patient's history were reviewed and updated as appropriate: allergies, current medications, past family history, past medical history, past social history, past surgical history and problem list     Objective: There were no vitals taken for this visit  Right Shoulder Exam     Tenderness   The patient is experiencing no tenderness  Range of Motion   The patient has normal right shoulder ROM  Muscle Strength   Abduction: 5/5     Other   Erythema: absent  Sensation: normal  Pulse: present            Physical Exam  Constitutional:       General: He is not in acute distress  Appearance: He is well-developed  Eyes:      Conjunctiva/sclera: Conjunctivae normal       Pupils: Pupils are equal, round, and reactive to light  Cardiovascular:      Rate and Rhythm: Normal rate and regular rhythm  Pulmonary:      Effort: Pulmonary effort is normal       Breath sounds: Normal breath sounds  Abdominal:      General: Bowel sounds are normal       Palpations: Abdomen is soft     Musculoskeletal: Cervical back: Normal range of motion and neck supple  Skin:     General: Skin is warm and dry  Findings: No erythema or rash  Neurological:      Mental Status: He is alert and oriented to person, place, and time  Deep Tendon Reflexes: Reflexes are normal and symmetric  Psychiatric:         Behavior: Behavior normal        I have personally reviewed pertinent films in PACS and my interpretation is as follows  X Ray Right Clavicle 8/15/2022:  Minimally displaced distal clavicle fracture that remains in acceptable alignment and position without complication and no further displacement is seen    Scribe Attestation    I,:  Alyse Hyman am acting as a scribe while in the presence of the attending physician :       I,:  Jermaine Singh MD personally performed the services described in this documentation    as scribed in my presence :

## 2022-09-07 ENCOUNTER — APPOINTMENT (OUTPATIENT)
Dept: LAB | Facility: CLINIC | Age: 72
End: 2022-09-07
Payer: MEDICARE

## 2022-09-07 DIAGNOSIS — E78.2 MIXED HYPERLIPIDEMIA: ICD-10-CM

## 2022-09-07 DIAGNOSIS — I25.119 CORONARY ARTERY DISEASE INVOLVING NATIVE CORONARY ARTERY OF NATIVE HEART WITH ANGINA PECTORIS (HCC): ICD-10-CM

## 2022-09-07 DIAGNOSIS — R73.01 IFG (IMPAIRED FASTING GLUCOSE): ICD-10-CM

## 2022-09-07 LAB
ALBUMIN SERPL BCP-MCNC: 3.6 G/DL (ref 3.5–5)
ALP SERPL-CCNC: 103 U/L (ref 46–116)
ALT SERPL W P-5'-P-CCNC: 40 U/L (ref 12–78)
ANION GAP SERPL CALCULATED.3IONS-SCNC: 5 MMOL/L (ref 4–13)
AST SERPL W P-5'-P-CCNC: 29 U/L (ref 5–45)
BILIRUB SERPL-MCNC: 0.79 MG/DL (ref 0.2–1)
BUN SERPL-MCNC: 19 MG/DL (ref 5–25)
CALCIUM SERPL-MCNC: 9 MG/DL (ref 8.3–10.1)
CHLORIDE SERPL-SCNC: 110 MMOL/L (ref 96–108)
CHOLEST SERPL-MCNC: 101 MG/DL
CO2 SERPL-SCNC: 27 MMOL/L (ref 21–32)
CREAT SERPL-MCNC: 0.97 MG/DL (ref 0.6–1.3)
EST. AVERAGE GLUCOSE BLD GHB EST-MCNC: 131 MG/DL
GFR SERPL CREATININE-BSD FRML MDRD: 78 ML/MIN/1.73SQ M
GLUCOSE P FAST SERPL-MCNC: 129 MG/DL (ref 65–99)
HBA1C MFR BLD: 6.2 %
HDLC SERPL-MCNC: 48 MG/DL
LDLC SERPL CALC-MCNC: 41 MG/DL (ref 0–100)
NONHDLC SERPL-MCNC: 53 MG/DL
POTASSIUM SERPL-SCNC: 4.6 MMOL/L (ref 3.5–5.3)
PROT SERPL-MCNC: 7 G/DL (ref 6.4–8.4)
SODIUM SERPL-SCNC: 142 MMOL/L (ref 135–147)
TRIGL SERPL-MCNC: 61 MG/DL

## 2022-09-07 PROCEDURE — 83036 HEMOGLOBIN GLYCOSYLATED A1C: CPT

## 2022-09-07 PROCEDURE — 80053 COMPREHEN METABOLIC PANEL: CPT

## 2022-09-07 PROCEDURE — 80061 LIPID PANEL: CPT

## 2022-09-07 PROCEDURE — 36415 COLL VENOUS BLD VENIPUNCTURE: CPT

## 2022-09-12 ENCOUNTER — OFFICE VISIT (OUTPATIENT)
Dept: CARDIOLOGY CLINIC | Facility: CLINIC | Age: 72
End: 2022-09-12
Payer: MEDICARE

## 2022-09-12 VITALS
WEIGHT: 189 LBS | HEART RATE: 58 BPM | BODY MASS INDEX: 26.46 KG/M2 | HEIGHT: 71 IN | SYSTOLIC BLOOD PRESSURE: 110 MMHG | DIASTOLIC BLOOD PRESSURE: 58 MMHG

## 2022-09-12 DIAGNOSIS — I21.4 NSTEMI (NON-ST ELEVATED MYOCARDIAL INFARCTION) (HCC): ICD-10-CM

## 2022-09-12 DIAGNOSIS — E78.2 MIXED HYPERLIPIDEMIA: ICD-10-CM

## 2022-09-12 DIAGNOSIS — I25.119 CORONARY ARTERY DISEASE INVOLVING NATIVE CORONARY ARTERY OF NATIVE HEART WITH ANGINA PECTORIS (HCC): Primary | ICD-10-CM

## 2022-09-12 DIAGNOSIS — Z95.5 S/P DRUG ELUTING CORONARY STENT PLACEMENT: ICD-10-CM

## 2022-09-12 DIAGNOSIS — R07.9 CHEST PAIN: ICD-10-CM

## 2022-09-12 PROCEDURE — 99214 OFFICE O/P EST MOD 30 MIN: CPT | Performed by: INTERNAL MEDICINE

## 2022-09-12 PROCEDURE — 93000 ELECTROCARDIOGRAM COMPLETE: CPT | Performed by: INTERNAL MEDICINE

## 2022-09-12 RX ORDER — ATORVASTATIN CALCIUM 20 MG/1
20 TABLET, FILM COATED ORAL
Qty: 90 TABLET | Refills: 3 | Status: SHIPPED | OUTPATIENT
Start: 2022-09-12

## 2022-09-12 RX ORDER — METOPROLOL SUCCINATE 25 MG/1
25 TABLET, EXTENDED RELEASE ORAL DAILY
Qty: 90 TABLET | Refills: 3 | Status: SHIPPED | OUTPATIENT
Start: 2022-09-12 | End: 2022-10-27 | Stop reason: SDUPTHER

## 2022-09-12 NOTE — PROGRESS NOTES
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 pectoris (HCC)  POCT ECG   2  Mixed hyperlipidemia  Lipid panel   3  Chest pain  atorvastatin (LIPITOR) 20 mg tablet   4  NSTEMI (non-ST elevated myocardial infarction) (HCC)  atorvastatin (LIPITOR) 20 mg tablet   5  S/P drug eluting coronary stent placement  metoprolol succinate (TOPROL-XL) 25 mg 24 hr tablet     Chief Complaint   Patient presents with    Follow-up     No cardiac complaints       Interval History: Patient feels well, without complaints  No reported chest pain, shortness of breath, palpitations, lightheadedness, syncope, LE edema, orthopnea, PND, or significant weight changes  Patient remains active without any increased fatigue out of the ordinary          Patient Active Problem List   Diagnosis    IFG (impaired fasting glucose)    Hyperlipidemia    Coronary artery disease involving native coronary artery of native heart with angina pectoris [facility])   [facility] discharge follow-up    Annual physical exam    Closed displaced fracture of lateral end of right clavicle     Past Medical History:   Diagnosis Date    Coronary artery disease     Femur fracture, right ([facility] [address] ) 2015    Hyperlipidemia      Social History     Socioeconomic History    Marital status: /Civil Union     Spouse name: Not on file    Number of children: 1    Years of education: Not on file    Highest education level: Not on file   Occupational History    Occupation: Retired    Tobacco Use    Smoking status: Never Smoker    Smokeless tobacco: Never Used   Vaping Use    Vaping Use: Never used   Substance and Sexual Activity    Alcohol use: Yes     Comment: occasionally    Drug use: No    Sexual activity: Yes   Other Topics Concern    Not on file   Social History Narrative        Non smoker    No caffeine use    Weekly alcohol cosumption    No recreational drug use    Always wears seatbelts    Retired    Lives with wife    No living will    No Congregational preference     Social Determinants of Health     Financial Resource Strain: Not on file   Food Insecurity: Not on file   Transportation Needs: Not on file   Physical Activity: Not on file   Stress: Not on file   Social Connections: Not on file   Intimate Partner Violence: Not on file   Housing Stability: Not on file      Family History   Problem Relation Age of Onset    Heart disease Mother     Thyroid disease Mother     Hypertension Mother     Pancreatic cancer Father     Lung cancer Maternal Aunt      Past Surgical History:   Procedure Laterality Date    CARDIAC CATHETERIZATION      CORONARY STENT PLACEMENT      TOTAL HIP ARTHROPLASTY         Current Outpatient Medications:     aspirin (ECOTRIN LOW STRENGTH) 81 mg EC tablet, Take 1 tablet (81 mg total) by mouth daily, Disp: 30 tablet, Rfl: 0    atorvastatin (LIPITOR) 20 mg tablet, Take 1 tablet (20 mg total) by mouth daily with dinner, Disp: 90 tablet, Rfl: 3    cholecalciferol (VITAMIN D3) 1,000 units tablet, Take 1,000 Units by mouth daily, Disp: , Rfl:     metoprolol succinate (TOPROL-XL) 25 mg 24 hr tablet, Take 1 tablet (25 mg total) by mouth daily, Disp: 90 tablet, Rfl: 3    methocarbamol (ROBAXIN) 500 mg tablet, Take 1 tablet (500 mg total) by mouth 2 (two) times a day, Disp: 20 tablet, Rfl: 0    morphine (MSIR) 15 mg tablet, Take 0 5 tablets (7 5 mg total) by mouth every 4 (four) hours as needed for severe pain Max Daily Amount: 45 mg, Disp: 5 tablet, Rfl: 0  No Known Allergies    Labs:  Appointment on 09/07/2022   Component Date Value    Sodium 09/07/2022 142     Potassium 09/07/2022 4 6     Chloride 09/07/2022 110 (A)    CO2 09/07/2022 27     ANION GAP 09/07/2022 5     BUN 09/07/2022 19     Creatinine 09/07/2022 0 97     Glucose, Fasting 09/07/2022 129 (A)    Calcium 09/07/2022 9 0     AST 09/07/2022 29     ALT 09/07/2022 40     Alkaline Phosphatase 09/07/2022 103     Total Protein 09/07/2022 7 0     Albumin 09/07/2022 3 6     Total Bilirubin 09/07/2022 0 79     eGFR 09/07/2022 78     Cholesterol 09/07/2022 101     Triglycerides 09/07/2022 61     HDL, Direct 09/07/2022 48     LDL Calculated 09/07/2022 41     Non-HDL-Chol (CHOL-HDL) 09/07/2022 53     Hemoglobin A1C 09/07/2022 6 2 (A)    EAG 09/07/2022 131      Lab Results   Component Value Date    CHOL 150 11/18/2014    TRIG 61 09/07/2022    TRIG 97 11/18/2014    HDL 48 09/07/2022    HDL 53 11/18/2014    LDLDIRECT 90 11/18/2014     Imaging: No results found  Review of Systems:  Review of Systems   Constitutional: Negative for activity change, appetite change, fatigue and fever  HENT: Negative for nosebleeds and sore throat  Eyes: Negative for photophobia and visual disturbance  Respiratory: Negative for cough, chest tightness, shortness of breath and wheezing  Cardiovascular: Negative for chest pain, palpitations and leg swelling  Gastrointestinal: Negative for abdominal pain, diarrhea, nausea and vomiting  Endocrine: Negative for polyuria  Genitourinary: Negative for dysuria, frequency and hematuria  Musculoskeletal: Negative for arthralgias, back pain and gait problem  Skin: Negative for pallor and rash  Neurological: Negative for dizziness, syncope, speech difficulty and light-headedness  Hematological: Does not bruise/bleed easily  Psychiatric/Behavioral: Negative for agitation, behavioral problems and confusion  Physical Exam:  Physical Exam  Vitals reviewed  Constitutional:       General: He is not in acute distress  Appearance: He is well-developed  He is not diaphoretic  HENT:      Head: Normocephalic and atraumatic        Nose: Nose normal    Eyes: General: No scleral icterus  Pupils: Pupils are equal, round, and reactive to light  Neck:      Vascular: No JVD  Cardiovascular:      Rate and Rhythm: Normal rate and regular rhythm  Heart sounds: S1 normal and S2 normal  Heart sounds not distant  No murmur heard  No systolic murmur is present  No friction rub  No gallop  No S3 sounds  Pulmonary:      Effort: Pulmonary effort is normal  No respiratory distress  Breath sounds: Normal breath sounds  No wheezing or rales  Abdominal:      General: Bowel sounds are normal  There is no distension  Palpations: Abdomen is soft  Musculoskeletal:         General: No deformity  Cervical back: Normal range of motion and neck supple  Skin:     General: Skin is warm and dry  Findings: No erythema  Neurological:      Mental Status: He is alert and oriented to person, place, and time  Cranial Nerves: No cranial nerve deficit  Psychiatric:         Behavior: Behavior normal        Blood pressure 110/58, pulse 58, height 5' 11" (1 803 m), weight 85 7 kg (189 lb)  EKG:  Sinus bradycardia  Otherwise normal ECG    Discussion/Summary:  CAD: with EMILIA x 2 to LCx and LAD (staged) in the setting of STEMI on March 17, 2021  Currently symptom free and tolerating ASA, B-blocker, and statin  S/p cardiac rehab and did well, trying to stay active  Now off Brilinta since he is one year after stent placement  HLD: continued on statin  LDL 38 in March 2021 and 30 in Sept 2021 - reduced Lipitor to 40mg since level is low  Repeat levels reveal LDL of 41 in Sept 2022 - will further reduce to 20mg now

## 2022-10-27 DIAGNOSIS — Z95.5 S/P DRUG ELUTING CORONARY STENT PLACEMENT: ICD-10-CM

## 2022-10-27 RX ORDER — METOPROLOL SUCCINATE 25 MG/1
25 TABLET, EXTENDED RELEASE ORAL DAILY
Qty: 90 TABLET | Refills: 3 | Status: SHIPPED | OUTPATIENT
Start: 2022-10-27

## 2023-01-11 ENCOUNTER — APPOINTMENT (OUTPATIENT)
Dept: LAB | Facility: CLINIC | Age: 73
End: 2023-01-11

## 2023-01-11 ENCOUNTER — OFFICE VISIT (OUTPATIENT)
Dept: FAMILY MEDICINE CLINIC | Facility: CLINIC | Age: 73
End: 2023-01-11

## 2023-01-11 VITALS
TEMPERATURE: 97.4 F | RESPIRATION RATE: 16 BRPM | OXYGEN SATURATION: 100 % | WEIGHT: 188.4 LBS | HEART RATE: 61 BPM | DIASTOLIC BLOOD PRESSURE: 63 MMHG | HEIGHT: 71 IN | BODY MASS INDEX: 26.38 KG/M2 | SYSTOLIC BLOOD PRESSURE: 134 MMHG

## 2023-01-11 DIAGNOSIS — Z00.00 WELCOME TO MEDICARE PREVENTIVE VISIT: Primary | ICD-10-CM

## 2023-01-11 DIAGNOSIS — E78.2 MIXED HYPERLIPIDEMIA: ICD-10-CM

## 2023-01-11 DIAGNOSIS — I25.119 CORONARY ARTERY DISEASE INVOLVING NATIVE CORONARY ARTERY OF NATIVE HEART WITH ANGINA PECTORIS (HCC): ICD-10-CM

## 2023-01-11 DIAGNOSIS — R73.01 IFG (IMPAIRED FASTING GLUCOSE): ICD-10-CM

## 2023-01-11 LAB
CHOLEST SERPL-MCNC: 109 MG/DL
HDLC SERPL-MCNC: 51 MG/DL
LDLC SERPL CALC-MCNC: 46 MG/DL (ref 0–100)
NONHDLC SERPL-MCNC: 58 MG/DL
TRIGL SERPL-MCNC: 62 MG/DL

## 2023-01-11 NOTE — PATIENT INSTRUCTIONS
Medicare Preventive Visit Patient Instructions  Thank you for completing your Welcome to Medicare Visit or Medicare Annual Wellness Visit today  Your next wellness visit will be due in one year (1/12/2024)  The screening/preventive services that you may require over the next 5-10 years are detailed below  Some tests may not apply to you based off risk factors and/or age  Screening tests ordered at today's visit but not completed yet may show as past due  Also, please note that scanned in results may not display below  Preventive Screenings:  Service Recommendations Previous Testing/Comments   Colorectal Cancer Screening  · Colonoscopy    · Fecal Occult Blood Test (FOBT)/Fecal Immunochemical Test (FIT)  · Fecal DNA/Cologuard Test  · Flexible Sigmoidoscopy Age: 39-70 years old   Colonoscopy: every 10 years (May be performed more frequently if at higher risk)  OR  FOBT/FIT: every 1 year  OR  Cologuard: every 3 years  OR  Sigmoidoscopy: every 5 years  Screening may be recommended earlier than age 39 if at higher risk for colorectal cancer  Also, an individualized decision between you and your healthcare provider will decide whether screening between the ages of 74-80 would be appropriate   Colonoscopy: 05/16/2022  FOBT/FIT: Not on file  Cologuard: Not on file  Sigmoidoscopy: Not on file    Screening Current     Prostate Cancer Screening Individualized decision between patient and health care provider in men between ages of 53-78   Medicare will cover every 12 months beginning on the day after your 50th birthday PSA: 3 3 ng/mL           Hepatitis C Screening Once for adults born between 1945 and 1965  More frequently in patients at high risk for Hepatitis C Hep C Antibody: 09/03/2019    Screening Current   Diabetes Screening 1-2 times per year if you're at risk for diabetes or have pre-diabetes Fasting glucose: 129 mg/dL (9/7/2022)  A1C: 6 2 % (9/7/2022)  Screening Current   Cholesterol Screening Once every 5 years if you don't have a lipid disorder  May order more often based on risk factors  Lipid panel: 09/07/2022  Screening Not Indicated  History Lipid Disorder      Other Preventive Screenings Covered by Medicare:  1  Abdominal Aortic Aneurysm (AAA) Screening: covered once if your at risk  You're considered to be at risk if you have a family history of AAA or a male between the age of 73-68 who smoking at least 100 cigarettes in your lifetime  2  Lung Cancer Screening: covers low dose CT scan once per year if you meet all of the following conditions: (1) Age 50-69; (2) No signs or symptoms of lung cancer; (3) Current smoker or have quit smoking within the last 15 years; (4) You have a tobacco smoking history of at least 20 pack years (packs per day x number of years you smoked); (5) You get a written order from a healthcare provider  3  Glaucoma Screening: covered annually if you're considered high risk: (1) You have diabetes OR (2) Family history of glaucoma OR (3)  aged 48 and older OR (3)  American aged 72 and older  3  Osteoporosis Screening: covered every 2 years if you meet one of the following conditions: (1) Have a vertebral abnormality; (2) On glucocorticoid therapy for more than 3 months; (3) Have primary hyperparathyroidism; (4) On osteoporosis medications and need to assess response to drug therapy  5  HIV Screening: covered annually if you're between the age of 12-76  Also covered annually if you are younger than 13 and older than 72 with risk factors for HIV infection  For pregnant patients, it is covered up to 3 times per pregnancy      Immunizations:  Immunization Recommendations   Influenza Vaccine Annual influenza vaccination during flu season is recommended for all persons aged >= 6 months who do not have contraindications   Pneumococcal Vaccine   * Pneumococcal conjugate vaccine = PCV13 (Prevnar 13), PCV15 (Vaxneuvance), PCV20 (Prevnar 20)  * Pneumococcal polysaccharide vaccine = PPSV23 (Pneumovax) Adults 2364 years old: 1-3 doses may be recommended based on certain risk factors  Adults 72 years old: 1-2 doses may be recommended based off what pneumonia vaccine you previously received   Hepatitis B Vaccine 3 dose series if at intermediate or high risk (ex: diabetes, end stage renal disease, liver disease)   Tetanus (Td) Vaccine - COST NOT COVERED BY MEDICARE PART B Following completion of primary series, a booster dose should be given every 10 years to maintain immunity against tetanus  Td may also be given as tetanus wound prophylaxis  Tdap Vaccine - COST NOT COVERED BY MEDICARE PART B Recommended at least once for all adults  For pregnant patients, recommended with each pregnancy  Shingles Vaccine (Shingrix) - COST NOT COVERED BY MEDICARE PART B  2 shot series recommended in those aged 48 and above     Health Maintenance Due:      Topic Date Due   • Colorectal Cancer Screening  05/13/2032   • Hepatitis C Screening  Completed     Immunizations Due:      Topic Date Due   • COVID-19 Vaccine (4 - Booster for Vania Abdi series) 06/24/2022     Advance Directives   What are advance directives? Advance directives are legal documents that state your wishes and plans for medical care  These plans are made ahead of time in case you lose your ability to make decisions for yourself  Advance directives can apply to any medical decision, such as the treatments you want, and if you want to donate organs  What are the types of advance directives? There are many types of advance directives, and each state has rules about how to use them  You may choose a combination of any of the following:  · Living will: This is a written record of the treatment you want  You can also choose which treatments you do not want, which to limit, and which to stop at a certain time  This includes surgery, medicine, IV fluid, and tube feedings  · Durable power of  for healthcare [place] [facility]):   This is a written record that states who you want to make healthcare choices for you when you are unable to make them for yourself  This person, called a proxy, is usually a family member or a friend  You may choose more than 1 proxy  · Do not resuscitate (DNR) order:  A DNR order is used in case your heart stops beating or you stop breathing  It is a request not to have certain forms of treatment, such as CPR  A DNR order may be included in other types of advance directives  · Medical directive: This covers the care that you want if you are in a coma, near death, or unable to make decisions for yourself  You can list the treatments you want for each condition  Treatment may include pain medicine, surgery, blood transfusions, dialysis, IV or tube feedings, and a ventilator (breathing machine)  · Values history: This document has questions about your views, beliefs, and how you feel and think about life  This information can help others choose the care that you would choose  Why are advance directives important? An advance directive helps you control your care  Although spoken wishes may be used, it is better to have your wishes written down  Spoken wishes can be misunderstood, or not followed  Treatments may be given even if you do not want them  An advance directive may make it easier for your family to make difficult choices about your care  Fall Prevention    Fall prevention  includes ways to make your home and other areas safer  It also includes ways you can move more carefully to prevent a fall  Health conditions that cause changes in your blood pressure, vision, or muscle strength and coordination may increase your risk for falls  Medicines may also increase your risk for falls if they make you dizzy, weak, or sleepy  Fall prevention tips:   · Stand or sit up slowly  · Use assistive devices as directed  · Wear shoes that fit well and have soles that   · Wear a personal alarm  · Stay active  · Manage your medical conditions  Home Safety Tips:  · Add items to prevent falls in the bathroom  · Keep paths clear  · Install bright lights in your home  · Keep items you use often on shelves within reach  · Paint or place reflective tape on the edges of your stairs  Weight Management   Why it is important to manage your weight:  Being overweight increases your risk of health conditions such as heart disease, high blood pressure, type 2 diabetes, and certain types of cancer  It can also increase your risk for osteoarthritis, sleep apnea, and other respiratory problems  Aim for a slow, steady weight loss  Even a small amount of weight loss can lower your risk of health problems  How to lose weight safely:  A safe and healthy way to lose weight is to eat fewer calories and get regular exercise  You can lose up about 1 pound a week by decreasing the number of calories you eat by 500 calories each day  Healthy meal plan for weight management:  A healthy meal plan includes a variety of foods, contains fewer calories, and helps you stay healthy  A healthy meal plan includes the following:  · Eat whole-grain foods more often  A healthy meal plan should contain fiber  Fiber is the part of grains, fruits, and vegetables that is not broken down by your body  Whole-grain foods are healthy and provide extra fiber in your diet  Some examples of whole-grain foods are whole-wheat breads and pastas, oatmeal, brown rice, and bulgur  · Eat a variety of vegetables every day  Include dark, leafy greens such as spinach, kale, oscar greens, and mustard greens  Eat yellow and orange vegetables such as carrots, sweet potatoes, and winter squash  · Eat a variety of fruits every day  Choose fresh or canned fruit (canned in its own juice or light syrup) instead of juice  Fruit juice has very little or no fiber  · Eat low-fat dairy foods  Drink fat-free (skim) milk or 1% milk   Eat fat-free yogurt and low-fat cottage cheese  Try low-fat cheeses such as mozzarella and other reduced-fat cheeses  · Choose meat and other protein foods that are low in fat  Choose beans or other legumes such as split peas or lentils  Choose fish, skinless poultry (chicken or turkey), or lean cuts of red meat (beef or pork)  Before you cook meat or poultry, cut off any visible fat  · Use less fat and oil  Try baking foods instead of frying them  Add less fat, such as margarine, sour cream, regular salad dressing and mayonnaise to foods  Eat fewer high-fat foods  Some examples of high-fat foods include french fries, doughnuts, ice cream, and cakes  · Eat fewer sweets  Limit foods and drinks that are high in sugar  This includes candy, cookies, regular soda, and sweetened drinks  Exercise:  Exercise at least 30 minutes per day on most days of the week  Some examples of exercise include walking, biking, dancing, and swimming  You can also fit in more physical activity by taking the stairs instead of the elevator or parking farther away from stores  Ask your healthcare provider about the best exercise plan for you  © Copyright [employer] 2018 Information is for End User's use only and may not be sold, redistributed or otherwise used for commercial purposes   All illustrations and images included in CareNotes® are the copyrighted property of A [employer] M [employer]  or [address] [place] [address]

## 2023-01-11 NOTE — PROGRESS NOTES
Assessment and Plan:     Problem List Items Addressed This Visit        Endocrine    IFG (impaired fasting glucose)     Remains elevated  Discussed lifestyle changes  Relevant Orders    Hemoglobin A1C       Cardiovascular and Mediastinum    Coronary artery disease involving native coronary artery of native heart with angina pectoris ([address] )     EMILIA placed in mid circumflex and LAD 3/15 and 3/17  Pt goes to Cardiology, Dr Graham Bowden  Compliant with asa, lipitor 20 and metoprolol succ 25 nightly  Brilinta stopped 1 year after stent placement  Relevant Orders    Basic metabolic panel       Other    Hyperlipidemia     Decreased to 20 due to low ldl  Relevant Orders    Lipid panel    Welcome to Medicare preventive visit - Primary     Normal physical exam    Labs reviewed  Immunizations UTD  Colonoscopy normal 3/2022   Pt is a nonsmoker but smoked on and off in college  AAA screening negative  No need to repeat  Relevant Orders    POCT ECG (Completed)        Preventive health issues were discussed with patient, and age appropriate screening tests were ordered as noted in patient's After Visit Summary  Personalized health advice and appropriate referrals for health education or preventive services given if needed, as noted in patient's After Visit Summary  History of Present Illness:     Patient presents for a Medicare Wellness Visit    HPI     Lab reviewed  Lipids at goal, a1c elevated at 6 2  Kidney function stable  Patient Care Team:  Cris Ferrari MD as PCP - General (Family Medicine)  Estrella Chinchilla MD as PCP - PCP-[facility] Attributed-Tyrel Prescott MD     Review of Systems:     Review of Systems   Constitutional: Negative for activity change, appetite change, fever and unexpected weight change  HENT: Negative for ear pain, postnasal drip and rhinorrhea  Eyes: Negative for photophobia and pain     Respiratory: Negative for cough, shortness of breath and wheezing  Cardiovascular: Negative for chest pain, palpitations and leg swelling  Gastrointestinal: Negative for abdominal pain, blood in stool, nausea and vomiting  Endocrine: Negative for polydipsia and polyuria  Genitourinary: Negative for difficulty urinating, hematuria and urgency  Musculoskeletal: Negative for myalgias  Skin: Negative for rash  Neurological: Negative for dizziness  Psychiatric/Behavioral: Negative for confusion and sleep disturbance          Problem List:     Patient Active Problem List   Diagnosis   • IFG (impaired fasting glucose)   • Hyperlipidemia   • Coronary artery disease involving native coronary artery of native heart with angina pectoris [facility])   • Murmur   • Hospital discharge follow-up   • Welcome to Medicare preventive visit   • Closed displaced fracture of lateral end of right clavicle      Past Medical and Surgical History:     Past Medical History:   Diagnosis Date   • Coronary artery disease    • Femur fracture, right ([address] ) 2015   • Hyperlipidemia      Past Surgical History:   Procedure Laterality Date   • CARDIAC CATHETERIZATION     • CORONARY STENT PLACEMENT     • TOTAL HIP ARTHROPLASTY        Family History:     Family History   Problem Relation Age of Onset   • Heart disease Mother    • Thyroid disease Mother    • Hypertension Mother    • Pancreatic cancer Father    • Lung cancer Maternal Aunt       Social History:     Social History     Socioeconomic History   • Marital status: /Civil Union     Spouse name: Not on file   • Number of children: 1   • Years of education: Not on file   • Highest education level: Not on file   Occupational History   • Occupation: Retired    Tobacco Use   • Smoking status: Never   • Smokeless tobacco: Never   Vaping Use   • Vaping Use: Never used   Substance and Sexual Activity   • Alcohol use: Yes     Comment: occasionally   • Drug use: No   • Sexual activity: Yes   Other Topics Concern   • Not on file   Social History Narrative        Non smoker    No caffeine use    Weekly alcohol cosumption    No recreational drug use    Always wears seatbelts    Retired    Lives with wife    No living will    No [employer] preference     Social Determinants of Health     Financial Resource Strain: Low Risk    • Difficulty of Paying Living Expenses: Not hard at all   Food Insecurity: Not on file   Transportation Needs: No Transportation Needs   • Lack of Transportation (Medical): No   • Lack of Transportation (Non-Medical): No   Physical Activity: Not on file   Stress: Not on file   Social Connections: Not on file   Intimate Partner Violence: Not on file   Housing Stability: Not on file      Medications and Allergies:     Current Outpatient Medications   Medication Sig Dispense Refill   • aspirin (ECOTRIN LOW STRENGTH) 81 mg EC tablet Take 1 tablet (81 mg total) by mouth daily 30 tablet 0   • atorvastatin (LIPITOR) 20 mg tablet Take 1 tablet (20 mg total) by mouth daily with dinner 90 tablet 3   • cholecalciferol (VITAMIN D3) 1,000 units tablet Take 1,000 Units by mouth daily     • metoprolol succinate (TOPROL-XL) 25 mg 24 hr tablet Take 1 tablet (25 mg total) by mouth daily 90 tablet 3     No current facility-administered medications for this visit       No Known Allergies   Immunizations:     Immunization History   Administered Date(s) Administered   • COVID-19 MODERNA VACC 0 25 ML IM BOOSTER 04/29/2022   • COVID-19 MODERNA VACC 0 5 ML IM 03/27/2021, 04/24/2021   • INFLUENZA 10/15/2018, 10/20/2020, 11/01/2021   • Influenza Split High Dose Preservative Free IM 10/15/2018   • Influenza, high dose seasonal 0 7 mL 09/09/2019   • Pneumococcal Conjugate 13-Valent 09/09/2019   • Pneumococcal Polysaccharide PPV23 09/21/2020   • Tdap 05/12/2020   • Zoster Vaccine Recombinant 10/30/2021      Health Maintenance:         Topic Date Due   • Colorectal Cancer Screening  05/13/2032   • Hepatitis C Screening  Completed Topic Date Due   • COVID-19 Vaccine (4 - Booster for Moderna series) 06/24/2022      Medicare Screening Tests and Risk Assessments:     Lon Barragan is here for his Welcome to Medicare visit  Health Risk Assessment:   Patient rates overall health as good  Patient feels that their physical health rating is same  Patient is satisfied with their life  Eyesight was rated as same  Hearing was rated as same  Patient feels that their emotional and mental health rating is much better  Patients states they are sometimes angry  Patient states they are never, rarely unusually tired/fatigued  Pain experienced in the last 7 days has been some  Patient's pain rating has been 2/10  Patient states that he has experienced no weight loss or gain in last 6 months  Patient reports pain in his right shoulder and the left heel of his foot     Depression Screening:   PHQ-2 Score: 0      Fall Risk Screening: In the past year, patient has experienced: history of falling in past year    Number of falls: 1  Injured during fall?: Yes    Feels unsteady when standing or walking?: No    Worried about falling?: No      Home Safety:  Patient does not have trouble with stairs inside or outside of their home  Patient has working smoke alarms and has no working carbon monoxide detector  Home safety hazards include: none  Nutrition:   Current diet is Regular, Low Cholesterol and No Added Salt  Medications:   Patient is not currently taking any over-the-counter supplements  Patient is able to manage medications  Activities of Daily Living (ADLs)/Instrumental Activities of Daily Living (IADLs):   Walk and transfer into and out of bed and chair?: Yes  Dress and groom yourself?: Yes    Bathe or shower yourself?: Yes    Feed yourself?  Yes  Do your laundry/housekeeping?: Yes  Manage your money, pay your bills and track your expenses?: Yes  Make your own meals?: Yes    Do your own shopping?: Yes    Previous Hospitalizations:   Any hospitalizations or ED visits within the last 12 months?: Yes    How many hospitalizations have you had in the last year?: 1-2    Advance Care Planning:   Living will: Yes    Advanced directive: Yes      Cognitive Screening:   Provider or family/friend/caregiver concerned regarding cognition?: No    PREVENTIVE SCREENINGS      Cardiovascular Screening:    General: Screening Not Indicated and History Lipid Disorder      Diabetes Screening:     General: Screening Current      Colorectal Cancer Screening:     General: Screening Current      Prostate Cancer Screening:    General: Risks and Benefits Discussed    Due for: PSA      Osteoporosis Screening:    General: Screening Not Indicated      Abdominal Aortic Aneurysm (AAA) Screening:    Risk factors include: age between 73-67 yo        Lung Cancer Screening:     General: Screening Not Indicated      Hepatitis C Screening:    General: Screening Current    Screening, Brief Intervention, and Referral to Treatment (SBIRT)    Screening  Typical number of drinks in a day: 0  Typical number of drinks in a week: 0  Interpretation: Low risk drinking behavior  Single Item Drug Screening:  How often have you used an illegal drug (including marijuana) or a prescription medication for non-medical reasons in the past year? never    Single Item Drug Screen Score: 0  Interpretation: Negative screen for possible drug use disorder    Brief Intervention  Alcohol & drug use screenings were reviewed  No concerns regarding substance use disorder identified       Vision Screening    Right eye Left eye Both eyes   Without correction      With correction 20/20/ 20/20 20/20        Physical Exam:     /63 (BP Location: Left arm, Patient Position: Sitting, Cuff Size: Adult)   Pulse 61   Temp (!) 97 4 °F (36 3 °C) (Tympanic)   Resp 16   Ht 5' 11" (1 803 m)   Wt 85 5 kg (188 lb 6 4 oz)   SpO2 100%   BMI 26 28 kg/m²     Physical Exam  Constitutional:       General: He is not in acute distress  Appearance: He is well-developed  HENT:      Head: Normocephalic and atraumatic  Eyes:      General: No scleral icterus  Conjunctiva/sclera: Conjunctivae normal       Pupils: Pupils are equal, round, and reactive to light  Cardiovascular:      Rate and Rhythm: Normal rate and regular rhythm  Heart sounds: Normal heart sounds  No murmur heard  No friction rub  No gallop  Pulmonary:      Effort: Pulmonary effort is normal  No respiratory distress  Breath sounds: Normal breath sounds  No wheezing or rales  Abdominal:      General: Bowel sounds are normal  There is no distension  Palpations: Abdomen is soft  There is no mass  Tenderness: There is no abdominal tenderness  There is no guarding  Musculoskeletal:         General: No tenderness  Cervical back: Normal range of motion  Skin:     General: Skin is warm and dry  Findings: No rash  Neurological:      Mental Status: He is alert and oriented to person, place, and time  Cranial Nerves: No cranial nerve deficit  Motor: No abnormal muscle tone          Recent Results (from the past 3360 hour(s))   Comprehensive metabolic panel    Collection Time: 09/07/22 11:01 AM   Result Value Ref Range    Sodium 142 135 - 147 mmol/L    Potassium 4 6 3 5 - 5 3 mmol/L    Chloride 110 (H) 96 - 108 mmol/L    CO2 27 21 - 32 mmol/L    ANION GAP 5 4 - 13 mmol/L    BUN 19 5 - 25 mg/dL    Creatinine 0 97 0 60 - 1 30 mg/dL    Glucose, Fasting 129 (H) 65 - 99 mg/dL    Calcium 9 0 8 3 - 10 1 mg/dL    AST 29 5 - 45 U/L    ALT 40 12 - 78 U/L    Alkaline Phosphatase 103 46 - 116 U/L    Total Protein 7 0 6 4 - 8 4 g/dL    Albumin 3 6 3 5 - 5 0 g/dL    Total Bilirubin 0 79 0 20 - 1 00 mg/dL    eGFR 78 ml/min/1 73sq m   Lipid panel    Collection Time: 09/07/22 11:01 AM   Result Value Ref Range    Cholesterol 101 See Comment mg/dL    Triglycerides 61 See Comment mg/dL    HDL, Direct 48 >=40 mg/dL    LDL Calculated 41 0 - 100 mg/dL    Non-HDL-Chol (CHOL-HDL) 53 mg/dl   Hemoglobin A1C    Collection Time: 09/07/22 11:01 AM   Result Value Ref Range    Hemoglobin A1C 6 2 (H) Normal 3 8-5 6%; PreDiabetic 5 7-6 4%;  Diabetic >=6 5%; Glycemic control for adults with diabetes <7 0% %     mg/dl   [facility]    Collection Time: 01/06/23 12:30 PM    Specimen: Other   Result Value Ref Range    SARS-CoV-2 Negative Negative   Lipid panel    Collection Time: 01/11/23  3:38 PM   Result Value Ref Range    Cholesterol 109 See Comment mg/dL    Triglycerides 62 See Comment mg/dL    HDL, Direct 51 >=40 mg/dL    LDL Calculated 46 0 - 100 mg/dL    Non-HDL-Chol (CHOL-HDL) 58 mg/dl       Rebeca Rodriguez MD

## 2023-01-11 NOTE — ASSESSMENT & PLAN NOTE
Normal physical exam    Labs reviewed  Immunizations UTD  Colonoscopy normal 3/2022   Pt is a nonsmoker but smoked on and off in college  AAA screening negative  No need to repeat

## 2023-01-11 NOTE — ASSESSMENT & PLAN NOTE
EMILIA placed in mid circumflex and LAD 3/15 and 3/17  Pt goes to Cardiology, Dr Darryn Richard  Compliant with asa, lipitor 20 and metoprolol succ 25 nightly  Brilinta stopped 1 year after stent placement

## 2023-02-23 ENCOUNTER — TELEPHONE (OUTPATIENT)
Dept: FAMILY MEDICINE CLINIC | Facility: CLINIC | Age: 73
End: 2023-02-23

## 2023-02-23 DIAGNOSIS — U07.1 COVID-19: Primary | ICD-10-CM

## 2023-02-23 RX ORDER — NIRMATRELVIR AND RITONAVIR 300-100 MG
3 KIT ORAL 2 TIMES DAILY
Qty: 30 TABLET | Refills: 0 | Status: SHIPPED | OUTPATIENT
Start: 2023-02-23 | End: 2023-02-28

## 2023-02-24 ENCOUNTER — TELEMEDICINE (OUTPATIENT)
Dept: FAMILY MEDICINE CLINIC | Facility: CLINIC | Age: 73
End: 2023-02-24

## 2023-02-24 DIAGNOSIS — U07.1 COVID-19: Primary | ICD-10-CM

## 2023-02-24 NOTE — PROGRESS NOTES
COVID-19 Outpatient Progress Note    Assessment/Plan:    Problem List Items Addressed This Visit    None  Visit Diagnoses     COVID-19    -  Primary         Disposition:     Discussed symptom directed medication options with patient  Discussed vitamin D, vitamin C, and/or zinc supplementation with patient  He will start paxlovid today  Stop lipitor for 15 days  Discussed isolation and quarantine period  Continue vit d and add vit c and zinc        Patient meets criteria for PAXLOVID and they have been counseled appropriately according to EUA documentation released by the FDA  After discussion, patient agrees to treatment  Margaret Uriostegui is an investigational medicine used to treat mild-to-moderate COVID-19 in adults and children (15years of age and older weighing at least 80 pounds (40 kg)) with positive results of direct SARS-CoV-2 viral testing, and who are at high risk for progression to severe COVID-19, including hospitalization or death  PAXLOVID is investigational because it is still being studied  There is limited information about the safety and effectiveness of using PAXLOVID to treat people with mild-to-moderate COVID-19  The FDA has authorized the emergency use of PAXLOVID for the treatment of mild-tomoderate COVID-19 in adults and children (15years of age and older weighing at least 80 pounds (40 kg)) with a positive test for the virus that causes COVID-19, and who are at high risk for progression to severe COVID-19, including hospitalization or death, under an EUA  What should I tell my healthcare provider before I take PAXLOVID? Tell your healthcare provider if you:  - Have any allergies  - Have liver or kidney disease  - Are pregnant or plan to become pregnant  - Are breastfeeding a child  - Have any serious illnesses    Tell your healthcare provider about all the medicines you take, including prescription and over-the-counter medicines, vitamins, and herbal supplements   Some medicines may interact with PAXLOVID and may cause serious side effects  Keep a list of your medicines to show your healthcare provider and pharmacist when you get a new medicine  You can ask your healthcare provider or pharmacist for a list of medicines that interact with PAXLOVID  Do not start taking a new medicine without telling your healthcare provider  Your healthcare provider can tell you if it is safe to take PAXLOVID with other medicines  Tell your healthcare provider if you are taking combined hormonal contraceptive  PAXLOVID may affect how your birth control pills work  Females who are able to become pregnant should use another effective alternative form of contraception or an additional barrier method of contraception  Talk to your healthcare provider if you have any questions about contraceptive methods that might be right for you  How do I take PAXLOVID? PAXLOVID consists of 2 medicines: nirmatrelvir and ritonavir  - Take 2 pink tablets of nirmatrelvir with 1 white tablet of ritonavir by mouth 2 times each day (in the morning and in the evening) for 5 days  For each dose, take all 3 tablets at the same time  - If you have kidney disease, talk to your healthcare provider  You may need a different dose  - Swallow the tablets whole  Do not chew, break, or crush the tablets  - Take PAXLOVID with or without food  - Do not stop taking PAXLOVID without talking to your healthcare provider, even if you feel better  - If you miss a dose of PAXLOVID within 8 hours of the time it is usually taken, take it as soon as you remember  If you miss a dose by more than 8 hours, skip the missed dose and take the next dose at your regular time  Do not take 2 doses of PAXLOVID at the same time  - If you take too much PAXLOVID, call your healthcare provider or go to the nearest hospital emergency room right away    - If you are taking a ritonavir- or cobicistat-containing medicine to treat hepatitis C or Human Immunodeficiency Virus (HIV), you should continue to take your medicine as prescribed by your healthcare provider   - Talk to your healthcare provider if you do not feel better or if you feel worse after 5 days  Who should generally not take PAXLOVID? Do not take PAXLOVID if:  You are allergic to nirmatrelvir, ritonavir, or any of the ingredients in PAXLOVID  You are taking any of the following medicines:  - Alfuzosin  - Pethidine, piroxicam, propoxyphene  - Ranolazine  - Amiodarone, dronedarone, flecainide, propafenone, quinidine  - Colchicine  - Lurasidone, pimozide, clozapine  - Dihydroergotamine, ergotamine, methylergonovine  - Lovastatin, simvastatin  - Sildenafil (Revatio®) for pulmonary arterial hypertension (PAH)  - Triazolam, oral midazolam  - Apalutamide  - Carbamazepine, phenobarbital, phenytoin  - Rifampin  - St  Lavon’s Wort (hypericum perforatum)    What are the important possible side effects of PAXLOVID? Possible side effects of PAXLOVID are:  - Liver Problems  Tell your healthcare provider right away if you have any of these signs and symptoms of liver problems: loss of appetite, yellowing of your skin and the whites of eyes (jaundice), dark-colored urine, pale colored stools and itchy skin, stomach area (abdominal) pain  - Resistance to HIV Medicines  If you have untreated HIV infection, PAXLOVID may lead to some HIV medicines not working as well in the future  - Other possible side effects include: altered sense of taste, diarrhea, high blood pressure, or muscle aches    These are not all the possible side effects of PAXLOVID  Not many people have taken PAXLOVID  Serious and unexpected side effects may happen  Marge Fernandez is still being studied, so it is possible that all of the risks are not known at this time  What other treatment choices are there? Like Nolberto Rideau may allow for the emergency use of other medicines to treat people with COVID-19   Go to https://[employer]/ for information on the emergency use of other medicines that are authorized by FDA to treat people with COVID-19  Your healthcare provider may talk with you about clinical trials for which you may be eligible  It is your choice to be treated or not to be treated with PAXLOVID  Should you decide not to receive it or for your child not to receive it, it will not change your standard medical care  What if I am pregnant or breastfeeding? There is no experience treating pregnant women or breastfeeding mothers with PAXLOVID  For a mother and unborn baby, the benefit of taking PAXLOVID may be greater than the risk from the treatment  If you are pregnant, discuss your options and specific situation with your healthcare provider  It is recommended that you use effective barrier contraception or do not have sexual activity while taking PAXLOVID  If you are breastfeeding, discuss your options and specific situation with your healthcare provider  How do I report side effects with PAXLOVID? Contact your healthcare provider if you have any side effects that bother you or do not go away  Report side effects to FDA MedWatch at www fda gov/medwatch or call 1-557-PQZ4965 or you can report side effects to [employer]  at the contact information provided below  Website Fax number Telephone number   [place] [phone]     How should I store Naila Doll? Store PAXLOVID tablets at room temperature between 68°F to 77°F (20°C to 25°C)  Full fact sheet for patients, parents, and caregivers can be found at: [address]    I have spent 15 minutes directly with the patient   Greater than 50% of this time was spent in counseling/coordination of care regarding: prognosis, risks and benefits of treatment options and instructions for management  Encounter provider: Lexi Grijalva MD     Provider located at: [facility]  [address] [place] [address] [id number]     Recent Visits  Date Type Provider Dept   02/23/23 Telephone Sabas Dominguez, [address] [facility] recent visits within past 7 days and meeting all other requirements  Today's Visits  Date Type Provider Dept   02/24/23 49 Jennifer Valentino Souza, [address] [facility] today's visits and meeting all other requirements  Future Appointments  No visits were found meeting these conditions  Showing future appointments within next 150 days and meeting all other requirements     This virtual check-in was done via [employer] and patient was informed that this is a secure, HIPAA-compliant platform  He agrees to proceed  Patient agrees to participate in a virtual check in via telephone or video visit instead of presenting to the office to address urgent/immediate medical needs  Patient is aware this is a billable service  He acknowledged consent and understanding of privacy and security of the video platform  The patient has agreed to participate and understands they can discontinue the visit at any time  After connecting through [facility], the patient was identified by name and date of birth  Buffy Mayer was informed that this was a telemedicine visit and that the exam was being conducted confidentially over secure lines  My office door was closed  No one else was in the room  Buffy Mayer acknowledged consent and understanding of privacy and security of the telemedicine visit  I informed the patient that I have reviewed his record in Epic and presented the opportunity for him to ask any questions regarding the visit today  The patient agreed to participate      Verification of patient location:  Patient is located in the following state in which I hold an active license: [place]    Subjective:   Marko Blanco is a 81 y o  male who has been screened for COVID-19  Symptom change since last report: improving  Patient's symptoms include fatigue, sore throat and cough  Patient denies fever, chills, shortness of breath, chest tightness and myalgias  - Date of symptom onset: 2/21/2023  - Date of exposure: 2/22/2023      COVID-19 vaccination status: Fully vaccinated with booster    Amairani Hernandez has been staying home and has isolated themselves in his home  He is taking care to not share personal items and is cleaning all surfaces that are touched often, like counters, tabletops, and doorknobs using household cleaning sprays or wipes  He is wearing a mask when he leaves his room  Lab Results   Component Value Date    SARSCOV2 Negative 01/06/2023       Review of Systems   Constitutional: Positive for fatigue  Negative for chills and fever  HENT: Positive for sore throat  Respiratory: Positive for cough  Negative for chest tightness and shortness of breath  Musculoskeletal: Negative for myalgias  Current Outpatient Medications on File Prior to Visit   Medication Sig   • aspirin (ECOTRIN LOW STRENGTH) 81 mg EC tablet Take 1 tablet (81 mg total) by mouth daily   • atorvastatin (LIPITOR) 20 mg tablet Take 1 tablet (20 mg total) by mouth daily with dinner   • cholecalciferol (VITAMIN D3) 1,000 units tablet Take 1,000 Units by mouth daily   • metoprolol succinate (TOPROL-XL) 25 mg 24 hr tablet Take 1 tablet (25 mg total) by mouth daily   • nirmatrelvir & ritonavir (Paxlovid, 300/100,) tablet therapy pack Take 3 tablets by mouth 2 (two) times a day for 5 days Take 2 nirmatrelvir tablets + 1 ritonavir tablet together per dose       Objective: There were no vitals taken for this visit  Physical Exam  Constitutional:       General: He is not in acute distress  Appearance: He is well-developed  He is not diaphoretic  HENT:      Head: Normocephalic and atraumatic  Right Ear: External ear normal       Left Ear: External ear normal    Eyes:      Conjunctiva/sclera: Conjunctivae normal    Pulmonary:      Effort: Pulmonary effort is normal  No respiratory distress  Neurological:      Mental Status: He is alert and oriented to person, place, and time         Yassine Valdez MD

## 2023-03-12 PROBLEM — Z00.00 WELCOME TO MEDICARE PREVENTIVE VISIT: Status: RESOLVED | Noted: 2021-09-21 | Resolved: 2023-03-12

## 2023-07-07 ENCOUNTER — APPOINTMENT (EMERGENCY)
Dept: RADIOLOGY | Facility: HOSPITAL | Age: 73
DRG: 200 | End: 2023-07-07
Payer: COMMERCIAL

## 2023-07-07 ENCOUNTER — HOSPITAL ENCOUNTER (INPATIENT)
Facility: HOSPITAL | Age: 73
LOS: 4 days | Discharge: HOME | DRG: 200 | End: 2023-07-11
Attending: EMERGENCY MEDICINE | Admitting: SURGERY
Payer: COMMERCIAL
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
## 2023-07-07 DIAGNOSIS — S22.49XA MULTIPLE RIB FRACTURES: ICD-10-CM

## 2023-07-07 DIAGNOSIS — J93.9 PNEUMOTHORAX: ICD-10-CM

## 2023-07-07 PROBLEM — S27.0XXA CLOSED TRAUMATIC FRACTURE OF RIBS OF RIGHT SIDE WITH PNEUMOTHORAX: Status: ACTIVE | Noted: 2023-07-07

## 2023-07-07 PROBLEM — S22.41XA MULTIPLE FRACTURES OF RIBS, RIGHT SIDE, INITIAL ENCOUNTER FOR CLOSED FRACTURE: Status: ACTIVE | Noted: 2023-07-07

## 2023-07-07 PROCEDURE — 71045 X-RAY EXAM CHEST 1 VIEW: CPT

## 2023-07-07 PROCEDURE — 96375 TX/PRO/DX INJ NEW DRUG ADDON: CPT

## 2023-07-07 PROCEDURE — 71260 CT THORAX DX C+: CPT

## 2023-07-07 PROCEDURE — 70450 CT HEAD/BRAIN W/O DYE: CPT

## 2023-07-07 PROCEDURE — 96376 TX/PRO/DX INJ SAME DRUG ADON: CPT

## 2023-07-07 PROCEDURE — 96374 THER/PROPH/DIAG INJ IV PUSH: CPT

## 2023-07-07 PROCEDURE — 99222 1ST HOSP IP/OBS MODERATE 55: CPT | Performed by: SURGERY

## 2023-07-07 PROCEDURE — 0W9930Z DRAINAGE OF RIGHT PLEURAL CAVITY WITH DRAINAGE DEVICE, PERCUTANEOUS APPROACH: ICD-10-PCS

## 2023-07-07 PROCEDURE — 74177 CT ABD & PELVIS W/CONTRAST: CPT

## 2023-07-07 PROCEDURE — G1004 CDSM NDSC: HCPCS

## 2023-07-07 PROCEDURE — 99285 EMERGENCY DEPT VISIT HI MDM: CPT

## 2023-07-07 PROCEDURE — 72125 CT NECK SPINE W/O DYE: CPT

## 2023-07-07 RX ORDER — POLYETHYLENE GLYCOL 3350 17 G/17G
17 POWDER, FOR SOLUTION ORAL DAILY
Status: DISCONTINUED | OUTPATIENT
Start: 2023-07-08 | End: 2023-07-11 | Stop reason: HOSPADM

## 2023-07-07 RX ORDER — METOPROLOL SUCCINATE 25 MG/1
25 TABLET, EXTENDED RELEASE ORAL DAILY
Status: DISCONTINUED | OUTPATIENT
Start: 2023-07-08 | End: 2023-07-11 | Stop reason: HOSPADM

## 2023-07-07 RX ORDER — ACETAMINOPHEN 325 MG/1
975 TABLET ORAL EVERY 6 HOURS SCHEDULED
Status: DISCONTINUED | OUTPATIENT
Start: 2023-07-07 | End: 2023-07-08

## 2023-07-07 RX ORDER — METOPROLOL TARTRATE 5 MG/5ML
5 INJECTION INTRAVENOUS EVERY 6 HOURS PRN
Status: DISCONTINUED | OUTPATIENT
Start: 2023-07-07 | End: 2023-07-11 | Stop reason: HOSPADM

## 2023-07-07 RX ORDER — HEPARIN SODIUM 5000 [USP'U]/ML
5000 INJECTION, SOLUTION INTRAVENOUS; SUBCUTANEOUS EVERY 8 HOURS SCHEDULED
Status: DISCONTINUED | OUTPATIENT
Start: 2023-07-07 | End: 2023-07-11 | Stop reason: HOSPADM

## 2023-07-07 RX ORDER — HYDROMORPHONE HCL IN WATER/PF 6 MG/30 ML
0.2 PATIENT CONTROLLED ANALGESIA SYRINGE INTRAVENOUS
Status: DISCONTINUED | OUTPATIENT
Start: 2023-07-07 | End: 2023-07-10

## 2023-07-07 RX ORDER — GABAPENTIN 100 MG/1
100 CAPSULE ORAL 3 TIMES DAILY
Status: DISCONTINUED | OUTPATIENT
Start: 2023-07-07 | End: 2023-07-08

## 2023-07-07 RX ORDER — LIDOCAINE HYDROCHLORIDE 10 MG/ML
20 INJECTION, SOLUTION EPIDURAL; INFILTRATION; INTRACAUDAL; PERINEURAL ONCE
Status: COMPLETED | OUTPATIENT
Start: 2023-07-07 | End: 2023-07-07

## 2023-07-07 RX ORDER — METHOCARBAMOL 500 MG/1
500 TABLET, FILM COATED ORAL ONCE
Status: COMPLETED | OUTPATIENT
Start: 2023-07-07 | End: 2023-07-07

## 2023-07-07 RX ORDER — HYDROMORPHONE HCL/PF 1 MG/ML
0.5 SYRINGE (ML) INJECTION ONCE
Status: COMPLETED | OUTPATIENT
Start: 2023-07-07 | End: 2023-07-07

## 2023-07-07 RX ORDER — AMOXICILLIN 250 MG
1 CAPSULE ORAL
Status: DISCONTINUED | OUTPATIENT
Start: 2023-07-07 | End: 2023-07-11 | Stop reason: HOSPADM

## 2023-07-07 RX ORDER — FENTANYL CITRATE 50 UG/ML
INJECTION, SOLUTION INTRAMUSCULAR; INTRAVENOUS
Status: COMPLETED
Start: 2023-07-07 | End: 2023-07-07

## 2023-07-07 RX ORDER — ONDANSETRON 2 MG/ML
4 INJECTION INTRAMUSCULAR; INTRAVENOUS EVERY 4 HOURS PRN
Status: DISCONTINUED | OUTPATIENT
Start: 2023-07-07 | End: 2023-07-11 | Stop reason: HOSPADM

## 2023-07-07 RX ORDER — ATORVASTATIN CALCIUM 20 MG/1
20 TABLET, FILM COATED ORAL
Status: DISCONTINUED | OUTPATIENT
Start: 2023-07-07 | End: 2023-07-11 | Stop reason: HOSPADM

## 2023-07-07 RX ORDER — FENTANYL CITRATE 50 UG/ML
1 INJECTION, SOLUTION INTRAMUSCULAR; INTRAVENOUS ONCE
Status: COMPLETED | OUTPATIENT
Start: 2023-07-07 | End: 2023-07-07

## 2023-07-07 RX ORDER — OXYCODONE HYDROCHLORIDE 5 MG/1
5 TABLET ORAL EVERY 4 HOURS PRN
Status: DISCONTINUED | OUTPATIENT
Start: 2023-07-07 | End: 2023-07-11

## 2023-07-07 RX ORDER — MIDAZOLAM HYDROCHLORIDE 2 MG/2ML
2 INJECTION, SOLUTION INTRAMUSCULAR; INTRAVENOUS ONCE
Status: COMPLETED | OUTPATIENT
Start: 2023-07-07 | End: 2023-07-07

## 2023-07-07 RX ORDER — METHOCARBAMOL 750 MG/1
750 TABLET, FILM COATED ORAL EVERY 6 HOURS SCHEDULED
Status: DISCONTINUED | OUTPATIENT
Start: 2023-07-07 | End: 2023-07-08

## 2023-07-07 RX ORDER — ACETAMINOPHEN 325 MG/1
650 TABLET ORAL EVERY 4 HOURS PRN
Status: DISCONTINUED | OUTPATIENT
Start: 2023-07-07 | End: 2023-07-07

## 2023-07-07 RX ORDER — HYDROMORPHONE HCL/PF 1 MG/ML
1 SYRINGE (ML) INJECTION ONCE
Status: COMPLETED | OUTPATIENT
Start: 2023-07-07 | End: 2023-07-07

## 2023-07-07 RX ORDER — HYDROMORPHONE HCL IN WATER/PF 6 MG/30 ML
0.2 PATIENT CONTROLLED ANALGESIA SYRINGE INTRAVENOUS EVERY 4 HOURS PRN
Status: DISCONTINUED | OUTPATIENT
Start: 2023-07-07 | End: 2023-07-07

## 2023-07-07 RX ADMIN — SENNOSIDES AND DOCUSATE SODIUM 1 TABLET: 50; 8.6 TABLET ORAL at 21:37

## 2023-07-07 RX ADMIN — ATORVASTATIN CALCIUM 20 MG: 20 TABLET, FILM COATED ORAL at 19:10

## 2023-07-07 RX ADMIN — MIDAZOLAM 2 MG: 1 INJECTION INTRAMUSCULAR; INTRAVENOUS at 15:21

## 2023-07-07 RX ADMIN — HYDROMORPHONE HYDROCHLORIDE 1 MG: 1 INJECTION, SOLUTION INTRAMUSCULAR; INTRAVENOUS; SUBCUTANEOUS at 13:55

## 2023-07-07 RX ADMIN — HYDROMORPHONE HYDROCHLORIDE 0.5 MG: 1 INJECTION, SOLUTION INTRAMUSCULAR; INTRAVENOUS; SUBCUTANEOUS at 15:10

## 2023-07-07 RX ADMIN — HYDROMORPHONE HYDROCHLORIDE 0.5 MG: 1 INJECTION, SOLUTION INTRAMUSCULAR; INTRAVENOUS; SUBCUTANEOUS at 15:42

## 2023-07-07 RX ADMIN — Medication 2.5 MG: at 17:46

## 2023-07-07 RX ADMIN — HYDROMORPHONE HYDROCHLORIDE 0.2 MG: 0.2 INJECTION, SOLUTION INTRAMUSCULAR; INTRAVENOUS; SUBCUTANEOUS at 19:10

## 2023-07-07 RX ADMIN — OXYCODONE HYDROCHLORIDE 5 MG: 5 TABLET ORAL at 21:38

## 2023-07-07 RX ADMIN — METHOCARBAMOL 500 MG: 500 TABLET ORAL at 16:34

## 2023-07-07 RX ADMIN — GABAPENTIN 100 MG: 100 CAPSULE ORAL at 21:37

## 2023-07-07 RX ADMIN — IOHEXOL 100 ML: 350 INJECTION, SOLUTION INTRAVENOUS at 14:49

## 2023-07-07 RX ADMIN — HEPARIN SODIUM 5000 UNITS: 5000 INJECTION INTRAVENOUS; SUBCUTANEOUS at 21:37

## 2023-07-07 RX ADMIN — ACETAMINOPHEN 975 MG: 325 TABLET, FILM COATED ORAL at 19:10

## 2023-07-07 RX ADMIN — METHOCARBAMOL 750 MG: 750 TABLET ORAL at 19:10

## 2023-07-07 RX ADMIN — LIDOCAINE HYDROCHLORIDE 20 ML: 10 INJECTION, SOLUTION EPIDURAL; INFILTRATION; INTRACAUDAL; PERINEURAL at 15:10
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
## 2023-07-07 NOTE — PROGRESS NOTES
Pastoral Care Progress Note    2023  Patient: Huber Connors : 1950  Admission Date & Time: 2023 1326  MRN: [id number] CSN: [id number]       responded to Rapid Response/Stroke Alert. . Sister Charanjit waiting in pt's room while Pt in [facility]. She is twin of pt. Family members live over an hour from hospital. Her sister will arrive shortly. Charanjit shared the events of his condition and is trying to stay positive. Pastoral support and hospitality were given. She was notified of  availability . Chaplaincy Interventions Utilized:   Empowerment: Clarified, confirmed, or reviewed information from treatment team , Encouraged self-care, and Provided chaplaincy education    Exploration: Explored relational needs & resources and Facilitated story telling    Relationship Building: Listened empathically, Hospitality, and Provided silent and supportive presence    Chaplaincy Outcomes Achieved:  Expressed gratitude, Expressed humor, and Made decisions    Spiritual Coping Strategies Utilized:   Connectedness and Spiritual comfort       23 1800   Hindu Information   Lutheran Affiliation None   Stress Factors   Family Stress Factors Lack of knowledge   Coping Responses   Family Coping Open/discussion; Anxiety   Family Spiritual Assessment   Feelings of Discouragement Yes   Plan of Care   Care Plan Initiated Yes   Provide Spiritual Support (Visits) 1 time   Comments Sister Charanjit present   Assessment Completed by: Unit visit
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
## 2023-07-08 ENCOUNTER — ANESTHESIA EVENT (INPATIENT)
Dept: SURGERY | Facility: HOSPITAL | Age: 73
DRG: 200 | End: 2023-07-08
Payer: COMMERCIAL

## 2023-07-08 ENCOUNTER — ANESTHESIA (INPATIENT)
Dept: SURGERY | Facility: HOSPITAL | Age: 73
DRG: 200 | End: 2023-07-08
Payer: COMMERCIAL

## 2023-07-08 ENCOUNTER — APPOINTMENT (OUTPATIENT)
Dept: SURGERY | Facility: HOSPITAL | Age: 73
DRG: 200 | End: 2023-07-08
Payer: COMMERCIAL

## 2023-07-08 LAB
ANION GAP SERPL CALCULATED.3IONS-SCNC: 4 MMOL/L
APTT PPP: 29 SECONDS (ref 23–37)
BASOPHILS # BLD AUTO: 0.04 THOUSANDS/ÂΜL (ref 0–0.1)
BASOPHILS NFR BLD AUTO: 0 % (ref 0–1)
BUN SERPL-MCNC: 28 MG/DL (ref 5–25)
CALCIUM SERPL-MCNC: 8.9 MG/DL (ref 8.3–10.1)
CHLORIDE SERPL-SCNC: 112 MMOL/L (ref 96–108)
CO2 SERPL-SCNC: 26 MMOL/L (ref 21–32)
CREAT SERPL-MCNC: 0.85 MG/DL (ref 0.6–1.3)
EOSINOPHIL # BLD AUTO: 0 THOUSAND/ÂΜL (ref 0–0.61)
EOSINOPHIL NFR BLD AUTO: 0 % (ref 0–6)
ERYTHROCYTE [DISTWIDTH] IN BLOOD BY AUTOMATED COUNT: 12.7 % (ref 11.6–15.1)
GFR SERPL CREATININE-BSD FRML MDRD: 87 ML/MIN/1.73SQ M
GLUCOSE SERPL-MCNC: 130 MG/DL (ref 65–140)
HCT VFR BLD AUTO: 43.4 % (ref 36.5–49.3)
HGB BLD-MCNC: 14.4 G/DL (ref 12–17)
IMM GRANULOCYTES # BLD AUTO: 0.04 THOUSAND/UL (ref 0–0.2)
IMM GRANULOCYTES NFR BLD AUTO: 0 % (ref 0–2)
INR PPP: 1.01 (ref 0.84–1.19)
LYMPHOCYTES # BLD AUTO: 1.65 THOUSANDS/ÂΜL (ref 0.6–4.47)
LYMPHOCYTES NFR BLD AUTO: 14 % (ref 14–44)
MAGNESIUM SERPL-MCNC: 2.4 MG/DL (ref 1.6–2.6)
MCH RBC QN AUTO: 30 PG (ref 26.8–34.3)
MCHC RBC AUTO-ENTMCNC: 33.2 G/DL (ref 31.4–37.4)
MCV RBC AUTO: 90 FL (ref 82–98)
MONOCYTES # BLD AUTO: 1.45 THOUSAND/ÂΜL (ref 0.17–1.22)
MONOCYTES NFR BLD AUTO: 12 % (ref 4–12)
NEUTROPHILS # BLD AUTO: 8.47 THOUSANDS/ÂΜL (ref 1.85–7.62)
NEUTS SEG NFR BLD AUTO: 74 % (ref 43–75)
NRBC BLD AUTO-RTO: 0 /100 WBCS
PHOSPHATE SERPL-MCNC: 3.4 MG/DL (ref 2.3–4.1)
PLATELET # BLD AUTO: 175 THOUSANDS/UL (ref 149–390)
PMV BLD AUTO: 10.4 FL (ref 8.9–12.7)
POTASSIUM SERPL-SCNC: 4.3 MMOL/L (ref 3.5–5.3)
PROTHROMBIN TIME: 13.5 SECONDS (ref 11.6–14.5)
RBC # BLD AUTO: 4.8 MILLION/UL (ref 3.88–5.62)
SODIUM SERPL-SCNC: 142 MMOL/L (ref 135–147)
WBC # BLD AUTO: 11.65 THOUSAND/UL (ref 4.31–10.16)

## 2023-07-08 PROCEDURE — 84100 ASSAY OF PHOSPHORUS: CPT

## 2023-07-08 PROCEDURE — 99232 SBSQ HOSP IP/OBS MODERATE 35: CPT | Performed by: SURGERY

## 2023-07-08 PROCEDURE — 99222 1ST HOSP IP/OBS MODERATE 55: CPT | Performed by: STUDENT IN AN ORGANIZED HEALTH CARE EDUCATION/TRAINING PROGRAM

## 2023-07-08 PROCEDURE — 85610 PROTHROMBIN TIME: CPT

## 2023-07-08 PROCEDURE — 3E0R3BZ INTRODUCTION OF ANESTHETIC AGENT INTO SPINAL CANAL, PERCUTANEOUS APPROACH: ICD-10-PCS | Performed by: HOSPITALIST

## 2023-07-08 PROCEDURE — 85025 COMPLETE CBC W/AUTO DIFF WBC: CPT

## 2023-07-08 PROCEDURE — 83735 ASSAY OF MAGNESIUM: CPT

## 2023-07-08 PROCEDURE — 80048 BASIC METABOLIC PNL TOTAL CA: CPT

## 2023-07-08 PROCEDURE — 97166 OT EVAL MOD COMPLEX 45 MIN: CPT

## 2023-07-08 PROCEDURE — 85730 THROMBOPLASTIN TIME PARTIAL: CPT

## 2023-07-08 PROCEDURE — 97163 PT EVAL HIGH COMPLEX 45 MIN: CPT

## 2023-07-08 RX ORDER — ACETAMINOPHEN 325 MG/1
975 TABLET ORAL EVERY 8 HOURS SCHEDULED
Status: DISCONTINUED | OUTPATIENT
Start: 2023-07-08 | End: 2023-07-11 | Stop reason: HOSPADM

## 2023-07-08 RX ORDER — FENTANYL CITRATE 50 UG/ML
INJECTION, SOLUTION INTRAMUSCULAR; INTRAVENOUS AS NEEDED
Status: DISCONTINUED | OUTPATIENT
Start: 2023-07-08 | End: 2023-07-08 | Stop reason: HOSPADM

## 2023-07-08 RX ORDER — METHOCARBAMOL 500 MG/1
500 TABLET, FILM COATED ORAL EVERY 6 HOURS SCHEDULED
Status: DISCONTINUED | OUTPATIENT
Start: 2023-07-08 | End: 2023-07-11

## 2023-07-08 RX ADMIN — HEPARIN SODIUM 5000 UNITS: 5000 INJECTION INTRAVENOUS; SUBCUTANEOUS at 21:54

## 2023-07-08 RX ADMIN — SENNOSIDES AND DOCUSATE SODIUM 1 TABLET: 50; 8.6 TABLET ORAL at 21:54

## 2023-07-08 RX ADMIN — HYDROMORPHONE HYDROCHLORIDE 0.2 MG: 0.2 INJECTION, SOLUTION INTRAMUSCULAR; INTRAVENOUS; SUBCUTANEOUS at 10:31

## 2023-07-08 RX ADMIN — ACETAMINOPHEN 975 MG: 325 TABLET, FILM COATED ORAL at 14:43

## 2023-07-08 RX ADMIN — METHOCARBAMOL 500 MG: 500 TABLET ORAL at 10:32

## 2023-07-08 RX ADMIN — HEPARIN SODIUM 5000 UNITS: 5000 INJECTION INTRAVENOUS; SUBCUTANEOUS at 14:44

## 2023-07-08 RX ADMIN — ACETAMINOPHEN 975 MG: 325 TABLET, FILM COATED ORAL at 05:23

## 2023-07-08 RX ADMIN — FENTANYL CITRATE 50 MCG: 50 INJECTION, SOLUTION INTRAMUSCULAR; INTRAVENOUS at 12:16

## 2023-07-08 RX ADMIN — ASPIRIN 81 MG: 81 TABLET, COATED ORAL at 14:43

## 2023-07-08 RX ADMIN — ATORVASTATIN CALCIUM 20 MG: 20 TABLET, FILM COATED ORAL at 17:19

## 2023-07-08 RX ADMIN — METHOCARBAMOL 750 MG: 750 TABLET ORAL at 00:15

## 2023-07-08 RX ADMIN — METHOCARBAMOL 750 MG: 750 TABLET ORAL at 05:23

## 2023-07-08 RX ADMIN — METHOCARBAMOL 500 MG: 500 TABLET ORAL at 17:19

## 2023-07-08 RX ADMIN — METOPROLOL SUCCINATE 25 MG: 25 TABLET, EXTENDED RELEASE ORAL at 09:15

## 2023-07-08 RX ADMIN — OXYCODONE HYDROCHLORIDE 5 MG: 5 TABLET ORAL at 09:15

## 2023-07-08 RX ADMIN — ACETAMINOPHEN 975 MG: 325 TABLET, FILM COATED ORAL at 00:15

## 2023-07-08 RX ADMIN — FENTANYL CITRATE 50 MCG: 50 INJECTION, SOLUTION INTRAMUSCULAR; INTRAVENOUS at 12:13

## 2023-07-08 RX ADMIN — Medication: at 14:16

## 2023-07-08 RX ADMIN — ACETAMINOPHEN 975 MG: 325 TABLET, FILM COATED ORAL at 21:54

## 2023-07-08 RX ADMIN — POLYETHYLENE GLYCOL 3350 17 G: 17 POWDER, FOR SOLUTION ORAL at 09:15

## 2023-07-08 NOTE — ANESTHESIA PROCEDURE NOTES
Epidural Block    Start time: 7/8/2023 1:15 PM  Reason for block: procedure for pain and at surgeon's request  Staffing  Performed by: Jeremías Figueroa MD  Authorized by: Mouna Magana MD    Preanesthetic Checklist  Completed: patient identified, IV checked, site marked, risks and benefits discussed, surgical consent, monitors and equipment checked, pre-op evaluation and timeout performed  Epidural  Patient position: sitting  Prep: ChloraPrep  Patient monitoring: cardiac monitor and frequent blood pressure checks  Approach: midline  Location: thoracic  Injection technique: ROJELIO air  Needle  Needle type: Tuohy   Needle gauge: 17 G  Catheter type: side hole  Catheter size: 19 G  Catheter at skin depth: 10 cm  Catheter securement method: stabilization device  Test dose: negative  Assessment  Sensory level: T10  Number of attempts: 3 or morenegative aspiration for CSF, negative aspiration for heme and no paresthesia on injection  patient tolerated the procedure well with no immediate complications  Additional Notes  Difficult thoracic epidural placement. 2 providers. >3 attempts with R paramedian approach without success due to os. Midline attempt @T7 successful with ROJELIO to saline at 5cm.
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
## 2023-07-08 NOTE — PLAN OF CARE
Problem: MOBILITY - ADULT  Goal: Maintain or return to baseline ADL function  Description: INTERVENTIONS:  -  Assess patient's ability to carry out ADLs; assess patient's baseline for ADL function and identify physical deficits which impact ability to perform ADLs (bathing, care of mouth/teeth, toileting, grooming, dressing, etc.)  - Assess/evaluate cause of self-care deficits   - Assess range of motion  - Assess patient's mobility; develop plan if impaired  - Assess patient's need for assistive devices and provide as appropriate  - Encourage maximum independence but intervene and supervise when necessary  - Involve family in performance of ADLs  - Assess for home care needs following discharge   - Consider OT consult to assist with ADL evaluation and planning for discharge  - Provide patient education as appropriate  Outcome: Progressing       Problem: PAIN - ADULT  Goal: Verbalizes/displays adequate comfort level or baseline comfort level  Description: Interventions:  - Encourage patient to monitor pain and request assistance  - Assess pain using appropriate pain scale  - Administer analgesics based on type and severity of pain and evaluate response  - Implement non-pharmacological measures as appropriate and evaluate response  - Consider cultural and social influences on pain and pain management  - Notify physician/advanced practitioner if interventions unsuccessful or patient reports new pain  Outcome: Progressing     Problem: INFECTION - ADULT  Goal: Absence or prevention of progression during hospitalization  Description: INTERVENTIONS:  - Assess and monitor for signs and symptoms of infection  - Monitor lab/diagnostic results  - Monitor all insertion sites, i.e. indwelling lines, tubes, and drains  - Monitor endotracheal if appropriate and nasal secretions for changes in amount and color  - Cora appropriate cooling/warming therapies per order  - Administer medications as ordered  - Instruct and encourage patient and family to use good hand hygiene technique  - Identify and instruct in appropriate isolation precautions for identified infection/condition  Outcome: Progressing  Goal: Absence of fever/infection during neutropenic period  Description: INTERVENTIONS:  - Monitor WBC    Outcome: Progressing     Problem: SAFETY ADULT  Goal: Patient will remain free of falls  Description: INTERVENTIONS:  - Educate patient/family on patient safety including physical limitations  - Instruct patient to call for assistance with activity   - Consult OT/PT to assist with strengthening/mobility   - Keep Call bell within reach  - Keep bed low and locked with side rails adjusted as appropriate  - Keep care items and personal belongings within reach  - Initiate and maintain comfort rounds  - Make Fall Risk Sign visible to staff  - Offer Toileting every 2 Hours, in advance of need  - Initiate/Maintain prn alarm  - Obtain necessary fall risk management equipment: bed alarm prn  - Apply yellow socks and bracelet for high fall risk patients  - Consider moving patient to room near nurses station  Outcome: Progressing  Goal: Maintain or return to baseline ADL function  Description: INTERVENTIONS:  -  Assess patient's ability to carry out ADLs; assess patient's baseline for ADL function and identify physical deficits which impact ability to perform ADLs (bathing, care of mouth/teeth, toileting, grooming, dressing, etc.)  - Assess/evaluate cause of self-care deficits   - Assess range of motion  - Assess patient's mobility; develop plan if impaired  - Assess patient's need for assistive devices and provide as appropriate  - Encourage maximum independence but intervene and supervise when necessary  - Involve family in performance of ADLs  - Assess for home care needs following discharge   - Consider OT consult to assist with ADL evaluation and planning for discharge  - Provide patient education as appropriate  Outcome: Progressing  Goal: Maintains/Returns to pre admission functional level  Description: INTERVENTIONS:  - Perform BMAT or MOVE assessment daily.   - Set and communicate daily mobility goal to care team and patient/family/caregiver. - Collaborate with rehabilitation services on mobility goals if consulted  - Perform Range of Motion 4 times a day. - Reposition patient every 2 hours. - Dangle patient 3 times a day  - Stand patient 3 times a day  - Ambulate patient 3 times a day  - Out of bed to chair 3times a day   - Out of bed for meals 3 times a day  - Out of bed for toileting  - Record patient progress and toleration of activity level   Outcome: Progressing     Problem: DISCHARGE PLANNING  Goal: Discharge to home or other facility with appropriate resources  Description: INTERVENTIONS:  - Identify barriers to discharge w/patient and caregiver  - Arrange for needed discharge resources and transportation as appropriate  - Identify discharge learning needs (meds, wound care, etc.)  - Arrange for interpretive services to assist at discharge as needed  - Refer to Case Management Department for coordinating discharge planning if the patient needs post-hospital services based on physician/advanced practitioner order or complex needs related to functional status, cognitive ability, or social support system  Outcome: Progressing     Problem: Knowledge Deficit  Goal: Patient/family/caregiver demonstrates understanding of disease process, treatment plan, medications, and discharge instructions  Description: Complete learning assessment and assess knowledge base.   Interventions:  - Provide teaching at level of understanding  - Provide teaching via preferred learning methods  Outcome: Progressing
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
## 2023-07-08 NOTE — ADDENDUM NOTE
Addendum  created 07/08/23 7029 by Leonardo Frost MD    Clinical Note Signed, Intraprocedure Blocks edited, LDA updated via procedure documentation
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
## 2023-07-08 NOTE — PLAN OF CARE
Problem: Potential for Falls  Goal: Patient will remain free of falls  Description: INTERVENTIONS:  - Educate patient/family on patient safety including physical limitations  - Instruct patient to call for assistance with activity   - Consult OT/PT to assist with strengthening/mobility   - Keep Call bell within reach  - Keep bed low and locked with side rails adjusted as appropriate  - Keep care items and personal belongings within reach  - Initiate and maintain comfort rounds  - Make Fall Risk Sign visible to staff  - Offer Toileting every Hours, in advance of need  - Initiate/Maintain alarm  - Obtain necessary fall risk management equipment:  Problem: PAIN - ADULT  Goal: Verbalizes/displays adequate comfort level or baseline comfort level  Description: Interventions:  - Encourage patient to monitor pain and request assistance  - Assess pain using appropriate pain scale  - Administer analgesics based on type and severity of pain and evaluate response  - Implement non-pharmacological measures as appropriate and evaluate response  - Consider cultural and social influences on pain and pain management  - Notify physician/advanced practitioner if interventions unsuccessful or patient reports new pain  Outcome: Progressing     - Apply yellow socks and bracelet for high fall risk patients  - Consider moving patient to room near nurses station  Outcome: Progressing

## 2023-07-09 ENCOUNTER — APPOINTMENT (INPATIENT)
Dept: RADIOLOGY | Facility: HOSPITAL | Age: 73
DRG: 200 | End: 2023-07-09
Payer: COMMERCIAL

## 2023-07-09 PROCEDURE — 99232 SBSQ HOSP IP/OBS MODERATE 35: CPT | Performed by: ANESTHESIOLOGY

## 2023-07-09 PROCEDURE — 71046 X-RAY EXAM CHEST 2 VIEWS: CPT

## 2023-07-09 PROCEDURE — 94760 N-INVAS EAR/PLS OXIMETRY 1: CPT

## 2023-07-09 PROCEDURE — 99232 SBSQ HOSP IP/OBS MODERATE 35: CPT | Performed by: SURGERY

## 2023-07-09 RX ADMIN — FENTANYL CITRATE: 50 INJECTION INTRAMUSCULAR; INTRAVENOUS at 15:55

## 2023-07-09 RX ADMIN — ACETAMINOPHEN 975 MG: 325 TABLET, FILM COATED ORAL at 22:05

## 2023-07-09 RX ADMIN — HEPARIN SODIUM 5000 UNITS: 5000 INJECTION INTRAVENOUS; SUBCUTANEOUS at 22:05

## 2023-07-09 RX ADMIN — METHOCARBAMOL 500 MG: 500 TABLET ORAL at 22:05

## 2023-07-09 RX ADMIN — METHOCARBAMOL 500 MG: 500 TABLET ORAL at 16:36

## 2023-07-09 RX ADMIN — METHOCARBAMOL 500 MG: 500 TABLET ORAL at 05:50

## 2023-07-09 RX ADMIN — Medication: at 03:57

## 2023-07-09 RX ADMIN — HEPARIN SODIUM 5000 UNITS: 5000 INJECTION INTRAVENOUS; SUBCUTANEOUS at 13:12

## 2023-07-09 RX ADMIN — POLYETHYLENE GLYCOL 3350 17 G: 17 POWDER, FOR SOLUTION ORAL at 08:17

## 2023-07-09 RX ADMIN — HEPARIN SODIUM 5000 UNITS: 5000 INJECTION INTRAVENOUS; SUBCUTANEOUS at 05:50

## 2023-07-09 RX ADMIN — ACETAMINOPHEN 975 MG: 325 TABLET, FILM COATED ORAL at 05:50

## 2023-07-09 RX ADMIN — METOPROLOL SUCCINATE 25 MG: 25 TABLET, EXTENDED RELEASE ORAL at 08:17

## 2023-07-09 RX ADMIN — ASPIRIN 81 MG: 81 TABLET, COATED ORAL at 08:17

## 2023-07-09 RX ADMIN — ATORVASTATIN CALCIUM 20 MG: 20 TABLET, FILM COATED ORAL at 16:36

## 2023-07-09 RX ADMIN — METHOCARBAMOL 500 MG: 500 TABLET ORAL at 11:29

## 2023-07-09 RX ADMIN — ACETAMINOPHEN 975 MG: 325 TABLET, FILM COATED ORAL at 13:12

## 2023-07-09 RX ADMIN — SENNOSIDES AND DOCUSATE SODIUM 1 TABLET: 50; 8.6 TABLET ORAL at 22:05
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
## 2023-07-09 NOTE — PLAN OF CARE

## 2023-07-09 NOTE — PROGRESS NOTES
Epidural Follow-up Note - Acute Pain Service    Warren Harrell 72 y.o. male MRN: [id number]  Unit/Bed#: [facility] 606-01 Encounter: [id number]      Assessment:   Principal Problem:    Closed traumatic fracture of ribs of right side with pneumothorax  Active Problems:    Hyperlipidemia    Coronary artery disease involving native coronary artery of native heart with angina pectoris (HCC)      Warren Harrell is a 72y.o. year old male with PMhx of CAD s/p stents on bASA and HLD who originally presented with R 3-5 rib fractures c/b PTX s/p chest tube placement. APS consulted for post-traumatic pain control. An epidural was placed on 7/8. Patient seen on rounds this morning. He endorses pain in his ribs and chest tube site, however much improved s/p the epidural placement yesterday. He is on room air and able to pull 1250 on IS without pain. He reports using his PCEA button helps his pain. His blood pressure is robust. He is eager to get OOB to a chair today. He is tolerating a diet. Plan:  Analgesia:  - Increase Thoracic epidural infusion of Ropivacaine 0.1% with fentanyl 2 mcg/mL to 10/5/10/3  - Continue Dilaudid 0.2 mg IV q3hr PRN for breakthrough pain  - Anticipate epidural removal and transition to primarily PO regimen 1-2 days, pending chest tube removal    Bowel Regimen:  - Docusate (Colace) 100 mg PO twice daily  - Senna 1 tablet PO qhs    APS will continue to follow. Please contact Acute Pain Service - [facility] via [email or web address] from [phone] with additional questions or concerns. See [email or web address] for additional contacts and after hours information.     Pain History  Current pain location(s): chest wall  Pain Scale: mild to moderate  Quality: ache, sharp  24 hour history: as above    PCEA use: appropriate  Opioid requirement previous 24 hours: none since epidural    Meds/Allergies   all current active meds have been reviewed    No Known Allergies    Objective     Temp:  [98.3 °F (36.8 °C)-99.2 °F (37.3 °C)] 99.1 °F (37.3 °C)  HR:  [53-63] 62  Resp:  [16-20] 16  BP: (136-162)/(59-78) 143/72    Physical Exam  Constitutional:       General: He is not in acute distress. Appearance: Normal appearance. Eyes:      Extraocular Movements: Extraocular movements intact. Conjunctiva/sclera: Conjunctivae normal.   Cardiovascular:      Rate and Rhythm: Normal rate and regular rhythm. Pulmonary:      Effort: Pulmonary effort is normal. No respiratory distress. Chest:      Chest wall: Tenderness present. Abdominal:      General: Abdomen is flat. There is no distension. Palpations: Abdomen is soft. Musculoskeletal:         General: Tenderness and signs of injury present. Normal range of motion. Cervical back: Normal range of motion and neck supple. Skin:     General: Skin is warm and dry. Neurological:      General: No focal deficit present. Mental Status: He is alert and oriented to person, place, and time. Psychiatric:         Mood and Affect: Mood normal.         Behavior: Behavior normal.       Epidural: Site clean/dry/intact, no surrounding erythema/edema/induration, infusion functioning appropriately    Lab Results:   Results from last 7 days   Lab Units 07/08/23  0446   WBC Thousand/uL 11.65*   HEMOGLOBIN g/dL 14.4   HEMATOCRIT % 43.4   PLATELETS Thousands/uL 175      Results from last 7 days   Lab Units 07/08/23  0446   POTASSIUM mmol/L 4.3   CHLORIDE mmol/L 112*   CO2 mmol/L 26   BUN mg/dL 28*   CREATININE mg/dL 0.85   CALCIUM mg/dL 8.9      Results from last 7 days   Lab Units 07/08/23  0446   PTT seconds 29   INR  1.01       Please note that the APS provides consultative services regarding pain management only. With the exception of ketamine, peripheral nerve catheters, and epidural infusions (and except when indicated), final decisions regarding starting or changing doses of analgesic medications are at the discretion of the consulting service.   Off hours consultation and/or medication management is generally not available.     Yudy Abad MD  Acute Pain Service
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
## 2023-07-10 ENCOUNTER — APPOINTMENT (INPATIENT)
Dept: RADIOLOGY | Facility: HOSPITAL | Age: 73
DRG: 200 | End: 2023-07-10
Payer: COMMERCIAL

## 2023-07-10 LAB
ANION GAP SERPL CALCULATED.3IONS-SCNC: 7 MMOL/L
BUN SERPL-MCNC: 13 MG/DL (ref 5–25)
CALCIUM SERPL-MCNC: 9.3 MG/DL (ref 8.3–10.1)
CHLORIDE SERPL-SCNC: 107 MMOL/L (ref 96–108)
CO2 SERPL-SCNC: 26 MMOL/L (ref 21–32)
CREAT SERPL-MCNC: 0.9 MG/DL (ref 0.6–1.3)
ERYTHROCYTE [DISTWIDTH] IN BLOOD BY AUTOMATED COUNT: 12.5 % (ref 11.6–15.1)
GFR SERPL CREATININE-BSD FRML MDRD: 85 ML/MIN/1.73SQ M
GLUCOSE SERPL-MCNC: 128 MG/DL (ref 65–140)
HCT VFR BLD AUTO: 47.7 % (ref 36.5–49.3)
HGB BLD-MCNC: 15.7 G/DL (ref 12–17)
MCH RBC QN AUTO: 29.5 PG (ref 26.8–34.3)
MCHC RBC AUTO-ENTMCNC: 32.9 G/DL (ref 31.4–37.4)
MCV RBC AUTO: 90 FL (ref 82–98)
PLATELET # BLD AUTO: 175 THOUSANDS/UL (ref 149–390)
PMV BLD AUTO: 10.7 FL (ref 8.9–12.7)
POTASSIUM SERPL-SCNC: 3.8 MMOL/L (ref 3.5–5.3)
RBC # BLD AUTO: 5.33 MILLION/UL (ref 3.88–5.62)
SODIUM SERPL-SCNC: 140 MMOL/L (ref 135–147)
WBC # BLD AUTO: 10.11 THOUSAND/UL (ref 4.31–10.16)

## 2023-07-10 PROCEDURE — 71046 X-RAY EXAM CHEST 2 VIEWS: CPT

## 2023-07-10 PROCEDURE — 97530 THERAPEUTIC ACTIVITIES: CPT

## 2023-07-10 PROCEDURE — 97116 GAIT TRAINING THERAPY: CPT

## 2023-07-10 PROCEDURE — 80048 BASIC METABOLIC PNL TOTAL CA: CPT

## 2023-07-10 PROCEDURE — 85027 COMPLETE CBC AUTOMATED: CPT

## 2023-07-10 PROCEDURE — 99232 SBSQ HOSP IP/OBS MODERATE 35: CPT

## 2023-07-10 PROCEDURE — 99232 SBSQ HOSP IP/OBS MODERATE 35: CPT | Performed by: ANESTHESIOLOGY

## 2023-07-10 RX ORDER — HYDROMORPHONE HCL IN WATER/PF 6 MG/30 ML
0.2 PATIENT CONTROLLED ANALGESIA SYRINGE INTRAVENOUS EVERY 2 HOUR PRN
Status: DISCONTINUED | OUTPATIENT
Start: 2023-07-10 | End: 2023-07-11

## 2023-07-10 RX ORDER — HYDROMORPHONE HCL/PF 1 MG/ML
0.25 SYRINGE (ML) INJECTION EVERY 2 HOUR PRN
Status: CANCELLED | OUTPATIENT
Start: 2023-07-10

## 2023-07-10 RX ADMIN — FENTANYL CITRATE: 50 INJECTION INTRAMUSCULAR; INTRAVENOUS at 09:05

## 2023-07-10 RX ADMIN — HEPARIN SODIUM 5000 UNITS: 5000 INJECTION INTRAVENOUS; SUBCUTANEOUS at 12:37

## 2023-07-10 RX ADMIN — ASPIRIN 81 MG: 81 TABLET, COATED ORAL at 08:16

## 2023-07-10 RX ADMIN — ACETAMINOPHEN 975 MG: 325 TABLET, FILM COATED ORAL at 21:58

## 2023-07-10 RX ADMIN — HEPARIN SODIUM 5000 UNITS: 5000 INJECTION INTRAVENOUS; SUBCUTANEOUS at 05:34

## 2023-07-10 RX ADMIN — METHOCARBAMOL 500 MG: 500 TABLET ORAL at 05:35

## 2023-07-10 RX ADMIN — METHOCARBAMOL 500 MG: 500 TABLET ORAL at 22:00

## 2023-07-10 RX ADMIN — HEPARIN SODIUM 5000 UNITS: 5000 INJECTION INTRAVENOUS; SUBCUTANEOUS at 22:00

## 2023-07-10 RX ADMIN — ATORVASTATIN CALCIUM 20 MG: 20 TABLET, FILM COATED ORAL at 16:35

## 2023-07-10 RX ADMIN — METHOCARBAMOL 500 MG: 500 TABLET ORAL at 12:33

## 2023-07-10 RX ADMIN — METHOCARBAMOL 500 MG: 500 TABLET ORAL at 16:35

## 2023-07-10 RX ADMIN — METOPROLOL SUCCINATE 25 MG: 25 TABLET, EXTENDED RELEASE ORAL at 08:16

## 2023-07-10 RX ADMIN — ACETAMINOPHEN 975 MG: 325 TABLET, FILM COATED ORAL at 12:37

## 2023-07-10 RX ADMIN — POLYETHYLENE GLYCOL 3350 17 G: 17 POWDER, FOR SOLUTION ORAL at 08:16

## 2023-07-10 RX ADMIN — ACETAMINOPHEN 975 MG: 325 TABLET, FILM COATED ORAL at 05:35

## 2023-07-10 NOTE — ASSESSMENT & PLAN NOTE
- continue home 20 mg atorvastatin Detail Level: Simple Render Risk Assessment In Note?: no Comment: - Extensive psoriasis, present for several years, with inadequate response to consistent triamcinolone use\\n- Concurrent osteoarthritis but no known psoriatic arthritis\\n- Hx stage 3 CKD, not on dialysis\\n- No known personal or family Hx of CHF, demyelinating disorders like multiple sclerosis\\n- No known personal Hx of Candidal infections, IBD, immunodeficiency, or malignancy

## 2023-07-10 NOTE — UTILIZATION REVIEW
NOTIFICATION OF INPATIENT ADMISSION   AUTHORIZATION REQUEST   SERVICING FACILITY:   [address] [place] [address]  Address: [address] [place] [address] [address] [facility] [address] [address]  Tax ID: [id number]  NPI: [id number] ATTENDING PROVIDER:  Attending Name and NPI#: Devante Lindsey [[id number]]  Address: [address] [place] [address] [address] [address]  Phone: [phone]   ADMISSION INFORMATION:  Place of Service: Inpatient [address] [facility]  Place of Service Code: 21  Inpatient Admission Date/Time: 7/7/23  5:41 PM  Discharge Date/Time: No discharge date for patient encounter. Admitting Diagnosis Code/Description:  Rib pain [R07.81]  Multiple rib fractures [S22.49XA]  Pneumothorax [J93.9]     UTILIZATION REVIEW CONTACT:  Nirmal Valadez, Utilization   Network Utilization Review Department  Phone: [phone]  Fax: [phone]  Email: [email or web address]  Contact for approvals/pending authorizations, clinical reviews, and discharge. PHYSICIAN ADVISORY SERVICES:  Medical Necessity Denial & [id number] Review  Phone: [phone]  Fax: [phone]  Email: [email or web address]
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
## 2023-07-10 NOTE — QUICK NOTE
Repeat chest x-ray this morning showed slightly decreased trace right apical pneumothorax. On evaluation, chest tube set to waterseal with no air leak. Chest tube removed today. Incision dressed with Adaptic, 4 x 4 gauze, and sealed with chest tube tape. Patient tolerated removal well.   We will repeat chest x-ray 4 hours after removal.

## 2023-07-10 NOTE — PLAN OF CARE
Problem: Potential for Falls  Goal: Patient will remain free of falls  Description: INTERVENTIONS:  - Educate patient/family on patient safety including physical limitations  - Instruct patient to call for assistance with activity   - Consult OT/PT to assist with strengthening/mobility   - Keep Call bell within reach  - Keep bed low and locked with side rails adjusted as appropriate  - Keep care items and personal belongings within reach  - Initiate and maintain comfort rounds  - Make Fall Risk Sign visible to staff  - Offer Toileting every  Hours, in advance of need  - Initiate/Maintain alarm  - Obtain necessary fall risk management equipment:   - Apply yellow socks and bracelet for high fall risk patients  - Consider moving patient to room near nurses station  Outcome: Progressing     Problem: MOBILITY - ADULT  Goal: Maintain or return to baseline ADL function  Description: INTERVENTIONS:  -  Assess patient's ability to carry out ADLs; assess patient's baseline for ADL function and identify physical deficits which impact ability to perform ADLs (bathing, care of mouth/teeth, toileting, grooming, dressing, etc.)  - Assess/evaluate cause of self-care deficits   - Assess range of motion  - Assess patient's mobility; develop plan if impaired  - Assess patient's need for assistive devices and provide as appropriate  - Encourage maximum independence but intervene and supervise when necessary  - Involve family in performance of ADLs  - Assess for home care needs following discharge   - Consider OT consult to assist with ADL evaluation and planning for discharge  - Provide patient education as appropriate  Outcome: Progressing  Goal: Maintains/Returns to pre admission functional level  Description: INTERVENTIONS:  - Perform BMAT or MOVE assessment daily.   - Set and communicate daily mobility goal to care team and patient/family/caregiver.    - Collaborate with rehabilitation services on mobility goals if consulted  - Perform Range of Motion  times a day. - Reposition patient every  hours.   - Dangle patient  times a day  - Stand patient  times a day  - Ambulate patient  times a day  - Out of bed to chair  times a day   - Out of bed for meals  times a day  - Out of bed for toileting  - Record patient progress and toleration of activity level   Outcome: Progressing     Problem: PAIN - ADULT  Goal: Verbalizes/displays adequate comfort level or baseline comfort level  Description: Interventions:  - Encourage patient to monitor pain and request assistance  - Assess pain using appropriate pain scale  - Administer analgesics based on type and severity of pain and evaluate response  - Implement non-pharmacological measures as appropriate and evaluate response  - Consider cultural and social influences on pain and pain management  - Notify physician/advanced practitioner if interventions unsuccessful or patient reports new pain  Outcome: Progressing     Problem: INFECTION - ADULT  Goal: Absence or prevention of progression during hospitalization  Description: INTERVENTIONS:  - Assess and monitor for signs and symptoms of infection  - Monitor lab/diagnostic results  - Monitor all insertion sites, i.e. indwelling lines, tubes, and drains  - Monitor endotracheal if appropriate and nasal secretions for changes in amount and color  - [place] appropriate cooling/warming therapies per order  - Administer medications as ordered  - Instruct and encourage patient and family to use good hand hygiene technique  - Identify and instruct in appropriate isolation precautions for identified infection/condition  Outcome: Progressing  Goal: Absence of fever/infection during neutropenic period  Description: INTERVENTIONS:  - Monitor WBC    Outcome: Progressing     Problem: SAFETY ADULT  Goal: Patient will remain free of falls  Description: INTERVENTIONS:  - Educate patient/family on patient safety including physical limitations  - Instruct patient to call for assistance with activity   - Consult OT/PT to assist with strengthening/mobility   - Keep Call bell within reach  - Keep bed low and locked with side rails adjusted as appropriate  - Keep care items and personal belongings within reach  - Initiate and maintain comfort rounds  - Make Fall Risk Sign visible to staff  - Offer Toileting every  Hours, in advance of need  - Initiate/Maintain alarm  - Obtain necessary fall risk management equipment:   - Apply yellow socks and bracelet for high fall risk patients  - Consider moving patient to room near nurses station  Outcome: Progressing  Goal: Maintain or return to baseline ADL function  Description: INTERVENTIONS:  -  Assess patient's ability to carry out ADLs; assess patient's baseline for ADL function and identify physical deficits which impact ability to perform ADLs (bathing, care of mouth/teeth, toileting, grooming, dressing, etc.)  - Assess/evaluate cause of self-care deficits   - Assess range of motion  - Assess patient's mobility; develop plan if impaired  - Assess patient's need for assistive devices and provide as appropriate  - Encourage maximum independence but intervene and supervise when necessary  - Involve family in performance of ADLs  - Assess for home care needs following discharge   - Consider OT consult to assist with ADL evaluation and planning for discharge  - Provide patient education as appropriate  Outcome: Progressing  Goal: Maintains/Returns to pre admission functional level  Description: INTERVENTIONS:  - Perform BMAT or MOVE assessment daily.   - Set and communicate daily mobility goal to care team and patient/family/caregiver. - Collaborate with rehabilitation services on mobility goals if consulted  - Perform Range of Motion  times a day. - Reposition patient every  hours.   - Dangle patient times a day  - Stand patient  times a day  - Ambulate patient  times a day  - Out of bed to chair  times a day   - Out of bed for meal times a day  - Out of bed for toileting  - Record patient progress and toleration of activity level   Outcome: Progressing     Problem: DISCHARGE PLANNING  Goal: Discharge to home or other facility with appropriate resources  Description: INTERVENTIONS:  - Identify barriers to discharge w/patient and caregiver  - Arrange for needed discharge resources and transportation as appropriate  - Identify discharge learning needs (meds, wound care, etc.)  - Arrange for interpretive services to assist at discharge as needed  - Refer to Case Management Department for coordinating discharge planning if the patient needs post-hospital services based on physician/advanced practitioner order or complex needs related to functional status, cognitive ability, or social support system  Outcome: Progressing 5
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
## 2023-07-10 NOTE — PLAN OF CARE

## 2023-07-10 NOTE — PHYSICAL THERAPY NOTE
Physical Therapy Progress Note       07/10/23 1005   PT Last Visit   PT Visit Date 07/10/23   Note Type   Note Type Treatment   Pain Assessment   Pain Assessment Tool 0-10   Pain Score 2   Pain Location/Orientation Orientation: Right;Location: Rib Cage   Hospital Pain Intervention(s) Repositioned; Ambulation/increased activity   Restrictions/Precautions   Other Precautions Pain;Multiple lines   Subjective   Subjective The patient notes some pain, but attests to ambulating on his own. He would like to trial stairs today. Transfers   Sit to Stand 7  Independent   Stand to Sit 7  Independent   Ambulation/Elevation   Gait pattern WNL   Gait Assistance 7  Independent   Assistive Device None   Distance 180 feet. Stair Management Assistance 7  Independent   Stair Management Technique One rail L;Alternating pattern; Foreward   Number of Stairs 14  (While carrying his chest tube.)   Balance   Static Sitting Normal   Dynamic Sitting Normal   Static Standing Normal   Dynamic Standing Normal   Ambulatory Normal   Activity Tolerance   Activity Tolerance Patient tolerated treatment well   Medical Staff Made Luis Street. PT DPT. Nurse Maria Ines Jacobs RN. Assessment   Prognosis Excellent   Problem List Pain   Assessment The patient is mobilizing at an independent level in all regards. He notes some pain with mobility, but he is able to manage all activities with it. There were no deficits in balance or strength, and he demonstrates safe technique throughout. He has no further skilled therapy needs. Barriers to Discharge None   Goals   Patient Goals To get back to normal.   STG Expiration Date 07/15/23   PT Treatment Day 1   Plan   Treatment/Interventions Functional transfer training;Elevations; Endurance training;Patient/family training;Bed mobility;Gait training   Progress Discontinue PT   Recommendation   PT Discharge Recommendation No rehabilitation needs   AM-PAC Basic Mobility Inpatient   Turning in Flat Bed Without Bedrails 4   Lying on Back to Sitting on Edge of Flat Bed Without Bedrails 4   Moving Bed to Chair 4   Standing Up From Chair Using Arms 4   Walk in Room 4   Climb 3-5 Stairs With Railing 4   Basic Mobility Inpatient Raw Score 24   Basic Mobility Standardized Score 57.68   Highest Level Of Mobility   -HLM Goal 8: Walk 250 feet or more   JH-HLM Achieved 7: Walk 25 feet or more         An AM-PAC Basic Mobility raw score less than 16 suggests the patient may benefit from discharge to post-acute rehab services.     Sharon Walton, PTA

## 2023-07-10 NOTE — PLAN OF CARE
Problem: PHYSICAL THERAPY ADULT  Goal: Performs mobility at highest level of function for planned discharge setting. See evaluation for individualized goals. Description: Treatment/Interventions: Functional transfer training, LE strengthening/ROM, Elevations, Therapeutic exercise, Endurance training, Patient/family training, Gait training, Spoke to nursing, OT  Equipment Recommended:  (None)       See flowsheet documentation for full assessment, interventions and recommendations. Outcome: Completed  Note: Prognosis: Excellent  Problem List: Pain  Assessment: The patient is mobilizing at an independent level in all regards. He notes some pain with mobility, but he is able to manage all activities with it. There were no deficits in balance or strength, and he demonstrates safe technique throughout. He has no further skilled therapy needs. Barriers to Discharge: None     PT Discharge Recommendation: No rehabilitation needs    See flowsheet documentation for full assessment.
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
## 2023-07-11 VITALS
DIASTOLIC BLOOD PRESSURE: 64 MMHG | OXYGEN SATURATION: 98 % | HEART RATE: 66 BPM | HEIGHT: 71 IN | WEIGHT: 191.8 LBS | RESPIRATION RATE: 18 BRPM | TEMPERATURE: 98.3 F | BODY MASS INDEX: 26.85 KG/M2 | SYSTOLIC BLOOD PRESSURE: 135 MMHG

## 2023-07-11 LAB
ANION GAP SERPL CALCULATED.3IONS-SCNC: 5 MMOL/L
BUN SERPL-MCNC: 14 MG/DL (ref 5–25)
CALCIUM SERPL-MCNC: 9 MG/DL (ref 8.3–10.1)
CHLORIDE SERPL-SCNC: 109 MMOL/L (ref 96–108)
CO2 SERPL-SCNC: 28 MMOL/L (ref 21–32)
CREAT SERPL-MCNC: 0.78 MG/DL (ref 0.6–1.3)
ERYTHROCYTE [DISTWIDTH] IN BLOOD BY AUTOMATED COUNT: 12.6 % (ref 11.6–15.1)
GFR SERPL CREATININE-BSD FRML MDRD: 90 ML/MIN/1.73SQ M
GLUCOSE SERPL-MCNC: 119 MG/DL (ref 65–140)
HCT VFR BLD AUTO: 43.7 % (ref 36.5–49.3)
HGB BLD-MCNC: 14.6 G/DL (ref 12–17)
MCH RBC QN AUTO: 29.6 PG (ref 26.8–34.3)
MCHC RBC AUTO-ENTMCNC: 33.4 G/DL (ref 31.4–37.4)
MCV RBC AUTO: 89 FL (ref 82–98)
PLATELET # BLD AUTO: 170 THOUSANDS/UL (ref 149–390)
PMV BLD AUTO: 10.4 FL (ref 8.9–12.7)
POTASSIUM SERPL-SCNC: 3.8 MMOL/L (ref 3.5–5.3)
RBC # BLD AUTO: 4.94 MILLION/UL (ref 3.88–5.62)
SODIUM SERPL-SCNC: 142 MMOL/L (ref 135–147)
WBC # BLD AUTO: 7.45 THOUSAND/UL (ref 4.31–10.16)

## 2023-07-11 PROCEDURE — 99238 HOSP IP/OBS DSCHRG MGMT 30/<: CPT | Performed by: EMERGENCY MEDICINE

## 2023-07-11 PROCEDURE — NC001 PR NO CHARGE

## 2023-07-11 PROCEDURE — 85027 COMPLETE CBC AUTOMATED: CPT

## 2023-07-11 PROCEDURE — 99232 SBSQ HOSP IP/OBS MODERATE 35: CPT | Performed by: ANESTHESIOLOGY

## 2023-07-11 PROCEDURE — 80048 BASIC METABOLIC PNL TOTAL CA: CPT

## 2023-07-11 RX ORDER — OXYCODONE HYDROCHLORIDE 10 MG/1
10 TABLET ORAL EVERY 6 HOURS PRN
Qty: 30 TABLET | Refills: 0 | Status: SHIPPED | OUTPATIENT
Start: 2023-07-11 | End: 2023-07-20

## 2023-07-11 RX ORDER — LIDOCAINE 50 MG/G
1 PATCH TOPICAL DAILY
Status: DISCONTINUED | OUTPATIENT
Start: 2023-07-11 | End: 2023-07-11 | Stop reason: HOSPADM

## 2023-07-11 RX ORDER — AMOXICILLIN 250 MG
1 CAPSULE ORAL
Qty: 14 TABLET | Refills: 0 | Status: SHIPPED | OUTPATIENT
Start: 2023-07-11 | End: 2023-07-25

## 2023-07-11 RX ORDER — METHOCARBAMOL 750 MG/1
750 TABLET, FILM COATED ORAL EVERY 6 HOURS PRN
Qty: 56 TABLET | Refills: 0 | Status: SHIPPED | OUTPATIENT
Start: 2023-07-11 | End: 2023-07-25

## 2023-07-11 RX ORDER — LIDOCAINE 50 MG/G
1 PATCH TOPICAL DAILY
Refills: 0
Start: 2023-07-12

## 2023-07-11 RX ORDER — OXYCODONE HYDROCHLORIDE 10 MG/1
10 TABLET ORAL EVERY 4 HOURS PRN
Status: DISCONTINUED | OUTPATIENT
Start: 2023-07-11 | End: 2023-07-11 | Stop reason: HOSPADM

## 2023-07-11 RX ORDER — ACETAMINOPHEN 325 MG/1
975 TABLET ORAL EVERY 8 HOURS SCHEDULED
Refills: 0
Start: 2023-07-11

## 2023-07-11 RX ORDER — OXYCODONE HYDROCHLORIDE 5 MG/1
5 TABLET ORAL EVERY 4 HOURS PRN
Status: DISCONTINUED | OUTPATIENT
Start: 2023-07-11 | End: 2023-07-11 | Stop reason: HOSPADM

## 2023-07-11 RX ORDER — METHOCARBAMOL 750 MG/1
750 TABLET, FILM COATED ORAL EVERY 6 HOURS SCHEDULED
Status: DISCONTINUED | OUTPATIENT
Start: 2023-07-11 | End: 2023-07-11 | Stop reason: HOSPADM

## 2023-07-11 RX ADMIN — HEPARIN SODIUM 5000 UNITS: 5000 INJECTION INTRAVENOUS; SUBCUTANEOUS at 06:00

## 2023-07-11 RX ADMIN — POLYETHYLENE GLYCOL 3350 17 G: 17 POWDER, FOR SOLUTION ORAL at 09:27

## 2023-07-11 RX ADMIN — METOPROLOL SUCCINATE 25 MG: 25 TABLET, EXTENDED RELEASE ORAL at 09:27

## 2023-07-11 RX ADMIN — OXYCODONE HYDROCHLORIDE 10 MG: 10 TABLET ORAL at 14:14

## 2023-07-11 RX ADMIN — ASPIRIN 81 MG: 81 TABLET, COATED ORAL at 09:27

## 2023-07-11 RX ADMIN — METHOCARBAMOL 750 MG: 750 TABLET ORAL at 09:31

## 2023-07-11 RX ADMIN — LIDOCAINE 5% 1 PATCH: 700 PATCH TOPICAL at 09:31

## 2023-07-11 RX ADMIN — HEPARIN SODIUM 5000 UNITS: 5000 INJECTION INTRAVENOUS; SUBCUTANEOUS at 14:10

## 2023-07-11 RX ADMIN — OXYCODONE HYDROCHLORIDE 5 MG: 5 TABLET ORAL at 03:27

## 2023-07-11 RX ADMIN — OXYCODONE HYDROCHLORIDE 5 MG: 5 TABLET ORAL at 09:31

## 2023-07-11 RX ADMIN — METHOCARBAMOL 500 MG: 500 TABLET ORAL at 05:15

## 2023-07-11 RX ADMIN — HYDROMORPHONE HYDROCHLORIDE 0.2 MG: 0.2 INJECTION, SOLUTION INTRAMUSCULAR; INTRAVENOUS; SUBCUTANEOUS at 04:45

## 2023-07-11 RX ADMIN — ACETAMINOPHEN 975 MG: 325 TABLET, FILM COATED ORAL at 14:10

## 2023-07-11 RX ADMIN — ACETAMINOPHEN 975 MG: 325 TABLET, FILM COATED ORAL at 05:15

## 2023-07-11 NOTE — PROGRESS NOTES
Tigertexted that patient's thoracic epidural catheter had come apart, the catheter disconnected from the plastic alligator/filter. Nurse placed sterile guaze over catheter tip and stopped pumped. Reviewed pain service note, plan was to trial stopping epidural tomorrow morning and likely removing. Chest tube was removed today. Saw patient at bedside. Pain 2/10 with brief episodes of sharp shooting pain. Safest option discussed with patient which is to remove epidural tonight and trial pain medications prn overnight. Pain service to see patient tomorrow for any adjustments. Patient in agreement with plan. Last heparin dose >7hrs ago. Epidural removed from back, tip intact. Site dry, dried old blood noted from epidural placement. Ok to restart heparin whenever its due.     Jen Yeung MD  Anesthesiologist on-call

## 2023-07-11 NOTE — PLAN OF CARE
Problem: Potential for Falls  Goal: Patient will remain free of falls  Description: INTERVENTIONS:  - Educate patient/family on patient safety including physical limitations  - Instruct patient to call for assistance with activity   - Consult OT/PT to assist with strengthening/mobility   - Keep Call bell within reach  - Keep bed low and locked with side rails adjusted as appropriate  - Keep care items and personal belongings within reach  - Initiate and maintain comfort rounds  - Make Fall Risk Sign visible to staff  - Offer Toileting every  Hours, in advance of need  - Initiate/Maintain alarm  - Obtain necessary fall risk management equipment:   - Apply yellow socks and bracelet for high fall risk patients  - Consider moving patient to room near nurses station  Outcome: Progressing     Problem: MOBILITY - ADULT  Goal: Maintain or return to baseline ADL function  Description: INTERVENTIONS:  -  Assess patient's ability to carry out ADLs; assess patient's baseline for ADL function and identify physical deficits which impact ability to perform ADLs (bathing, care of mouth/teeth, toileting, grooming, dressing, etc.)  - Assess/evaluate cause of self-care deficits   - Assess range of motion  - Assess patient's mobility; develop plan if impaired  - Assess patient's need for assistive devices and provide as appropriate  - Encourage maximum independence but intervene and supervise when necessary  - Involve family in performance of ADLs  - Assess for home care needs following discharge   - Consider OT consult to assist with ADL evaluation and planning for discharge  - Provide patient education as appropriate  Outcome: Progressing  Goal: Maintains/Returns to pre admission functional level  Description: INTERVENTIONS:  - Perform BMAT or MOVE assessment daily.   - Set and communicate daily mobility goal to care team and patient/family/caregiver.    - Collaborate with rehabilitation services on mobility goals if consulted  - Perform Range of Motion  times a day. - Reposition patient every  hours.   - Dangle patient  times a day  - Stand patient  times a day  - Ambulate patient  times a day  - Out of bed to chair  times a day   - Out of bed for meals  times a day  - Out of bed for toileting  - Record patient progress and toleration of activity level   Outcome: Progressing     Problem: PAIN - ADULT  Goal: Verbalizes/displays adequate comfort level or baseline comfort level  Description: Interventions:  - Encourage patient to monitor pain and request assistance  - Assess pain using appropriate pain scale  - Administer analgesics based on type and severity of pain and evaluate response  - Implement non-pharmacological measures as appropriate and evaluate response  - Consider cultural and social influences on pain and pain management  - Notify physician/advanced practitioner if interventions unsuccessful or patient reports new pain  Outcome: Progressing     Problem: INFECTION - ADULT  Goal: Absence or prevention of progression during hospitalization  Description: INTERVENTIONS:  - Assess and monitor for signs and symptoms of infection  - Monitor lab/diagnostic results  - Monitor all insertion sites, i.e. indwelling lines, tubes, and drains  - Monitor endotracheal if appropriate and nasal secretions for changes in amount and color  - [place] appropriate cooling/warming therapies per order  - Administer medications as ordered  - Instruct and encourage patient and family to use good hand hygiene technique  - Identify and instruct in appropriate isolation precautions for identified infection/condition  Outcome: Progressing  Goal: Absence of fever/infection during neutropenic period  Description: INTERVENTIONS:  - Monitor WBC    Outcome: Progressing     Problem: SAFETY ADULT  Goal: Patient will remain free of falls  Description: INTERVENTIONS:  - Educate patient/family on patient safety including physical limitations  - Instruct patient to call for assistance with activity   - Consult OT/PT to assist with strengthening/mobility   - Keep Call bell within reach  - Keep bed low and locked with side rails adjusted as appropriate  - Keep care items and personal belongings within reach  - Initiate and maintain comfort rounds  - Make Fall Risk Sign visible to staff  - Offer Toileting every  Hours, in advance of need  - Initiate/Maintain alarm  - Obtain necessary fall risk management equipment:   - Apply yellow socks and bracelet for high fall risk patients  - Consider moving patient to room near nurses station  Outcome: Progressing  Goal: Maintain or return to baseline ADL function  Description: INTERVENTIONS:  -  Assess patient's ability to carry out ADLs; assess patient's baseline for ADL function and identify physical deficits which impact ability to perform ADLs (bathing, care of mouth/teeth, toileting, grooming, dressing, etc.)  - Assess/evaluate cause of self-care deficits   - Assess range of motion  - Assess patient's mobility; develop plan if impaired  - Assess patient's need for assistive devices and provide as appropriate  - Encourage maximum independence but intervene and supervise when necessary  - Involve family in performance of ADLs  - Assess for home care needs following discharge   - Consider OT consult to assist with ADL evaluation and planning for discharge  - Provide patient education as appropriate  Outcome: Progressing  Goal: Maintains/Returns to pre admission functional level  Description: INTERVENTIONS:  - Perform BMAT or MOVE assessment daily.   - Set and communicate daily mobility goal to care team and patient/family/caregiver. - Collaborate with rehabilitation services on mobility goals if consulted  - Perform Range of Motion  times a day. - Reposition patient every  hours.   - Dangle patient  times a day  - Stand patient  times a day  - Ambulate patient  times a day  - Out of bed to chair  times a day   - Out of bed for meal times a day  - Out of bed for toileting  - Record patient progress and toleration of activity level   Outcome: Progressing     Problem: DISCHARGE PLANNING  Goal: Discharge to home or other facility with appropriate resources  Description: INTERVENTIONS:  - Identify barriers to discharge w/patient and caregiver  - Arrange for needed discharge resources and transportation as appropriate  - Identify discharge learning needs (meds, wound care, etc.)  - Arrange for interpretive services to assist at discharge as needed  - Refer to Case Management Department for coordinating discharge planning if the patient needs post-hospital services based on physician/advanced practitioner order or complex needs related to functional status, cognitive ability, or social support system  Outcome: Progressing     Problem: Knowledge Deficit  Goal: Patient/family/caregiver demonstrates understanding of disease process, treatment plan, medications, and discharge instructions  Description: Complete learning assessment and assess knowledge base.   Interventions:  - Provide teaching at level of understanding  - Provide teaching via preferred learning methods  Outcome: Progressing

## 2023-07-11 NOTE — DISCHARGE INSTR - AVS FIRST PAGE
Traumatic Rib Fracture Discharge Instructions: Your rib fractures will take time to heal. Rib fractures typically take at least 6-8 weeks to heal and may take longer. Activity:  - Walking and normal light activities are encouraged. Normal daily activities including climbing steps are okay. - Avoid lifting greater than 10 pounds, any strenuous activities and/or exercise, and contact sports until cleared by the trauma service. - Continue using the incentive spirometer at least 10 times every hour while awake. Medications:    - You should continue your current medication regimen after discharge unless otherwise instructed. Please refer to your discharge medication list for further details. - Please take the pain medications as directed. - You are encouraged to use non-narcotic pain medications first and whenever possible. Reserve the use of narcotic pain medication for moderate to severe pain not controlled by non-narcotic medications.  - No driving while taking narcotic pain medications. - You may become constipated, especially if taking pain medications. You may take any over the counter stool softeners or laxatives as needed. Examples: Milk of Magnesia, Colace, Senna. Additional Instructions:  - If you have any questions or concerns after discharge please call the office.  - Call office or return to ER if fever greater than 101, chills, worsening/uncontrollable pain, develop productive cough, increasing shortness of breath, and/or difficulty breathing.

## 2023-07-11 NOTE — PLAN OF CARE
Problem: Potential for Falls  Goal: Patient will remain free of falls  Description: INTERVENTIONS:  - Educate patient/family on patient safety including physical limitations  - Instruct patient to call for assistance with activity   - Consult OT/PT to assist with strengthening/mobility   - Keep Call bell within reach  - Keep bed low and locked with side rails adjusted as appropriate  - Keep care items and personal belongings within reach  - Initiate and maintain comfort rounds  - Make Fall Risk Sign visible to staff  - Offer Toileting every  Hours, in advance of need  - Initiate/Maintain alarm  - Obtain necessary fall risk management equipment:   - Apply yellow socks and bracelet for high fall risk patients  - Consider moving patient to room near nurses station  Outcome: Progressing     Problem: MOBILITY - ADULT  Goal: Maintain or return to baseline ADL function  Description: INTERVENTIONS:  -  Assess patient's ability to carry out ADLs; assess patient's baseline for ADL function and identify physical deficits which impact ability to perform ADLs (bathing, care of mouth/teeth, toileting, grooming, dressing, etc.)  - Assess/evaluate cause of self-care deficits   - Assess range of motion  - Assess patient's mobility; develop plan if impaired  - Assess patient's need for assistive devices and provide as appropriate  - Encourage maximum independence but intervene and supervise when necessary  - Involve family in performance of ADLs  - Assess for home care needs following discharge   - Consider OT consult to assist with ADL evaluation and planning for discharge  - Provide patient education as appropriate  Outcome: Progressing  Goal: Maintains/Returns to pre admission functional level  Description: INTERVENTIONS:  - Perform BMAT or MOVE assessment daily.   - Set and communicate daily mobility goal to care team and patient/family/caregiver.    - Collaborate with rehabilitation services on mobility goals if consulted  - Perform Range of Motion  times a day. - Reposition patient every  hours.   - Dangle patient  times a day  - Stand patient  times a day  - Ambulate patient  times a day  - Out of bed to chair  times a day   - Out of bed for meals  times a day  - Out of bed for toileting  - Record patient progress and toleration of activity level   Outcome: Progressing     Problem: PAIN - ADULT  Goal: Verbalizes/displays adequate comfort level or baseline comfort level  Description: Interventions:  - Encourage patient to monitor pain and request assistance  - Assess pain using appropriate pain scale  - Administer analgesics based on type and severity of pain and evaluate response  - Implement non-pharmacological measures as appropriate and evaluate response  - Consider cultural and social influences on pain and pain management  - Notify physician/advanced practitioner if interventions unsuccessful or patient reports new pain  Outcome: Progressing     Problem: INFECTION - ADULT  Goal: Absence or prevention of progression during hospitalization  Description: INTERVENTIONS:  - Assess and monitor for signs and symptoms of infection  - Monitor lab/diagnostic results  - Monitor all insertion sites, i.e. indwelling lines, tubes, and drains  - Monitor endotracheal if appropriate and nasal secretions for changes in amount and color  - [place] appropriate cooling/warming therapies per order  - Administer medications as ordered  - Instruct and encourage patient and family to use good hand hygiene technique  - Identify and instruct in appropriate isolation precautions for identified infection/condition  Outcome: Progressing  Goal: Absence of fever/infection during neutropenic period  Description: INTERVENTIONS:  - Monitor WBC    Outcome: Progressing     Problem: SAFETY ADULT  Goal: Patient will remain free of falls  Description: INTERVENTIONS:  - Educate patient/family on patient safety including physical limitations  - Instruct patient to call for assistance with activity   - Consult OT/PT to assist with strengthening/mobility   - Keep Call bell within reach  - Keep bed low and locked with side rails adjusted as appropriate  - Keep care items and personal belongings within reach  - Initiate and maintain comfort rounds  - Make Fall Risk Sign visible to staff  - Offer Toileting every  Hours, in advance of need  - Initiate/Maintain alarm  - Obtain necessary fall risk management equipmen  - Apply yellow socks and bracelet for high fall risk patients  - Consider moving patient to room near nurses station  Outcome: Progressing  Goal: Maintain or return to baseline ADL function  Description: INTERVENTIONS:  -  Assess patient's ability to carry out ADLs; assess patient's baseline for ADL function and identify physical deficits which impact ability to perform ADLs (bathing, care of mouth/teeth, toileting, grooming, dressing, etc.)  - Assess/evaluate cause of self-care deficits   - Assess range of motion  - Assess patient's mobility; develop plan if impaired  - Assess patient's need for assistive devices and provide as appropriate  - Encourage maximum independence but intervene and supervise when necessary  - Involve family in performance of ADLs  - Assess for home care needs following discharge   - Consider OT consult to assist with ADL evaluation and planning for discharge  - Provide patient education as appropriate  Outcome: Progressing  Goal: Maintains/Returns to pre admission functional level  Description: INTERVENTIONS:  - Perform BMAT or MOVE assessment daily.   - Set and communicate daily mobility goal to care team and patient/family/caregiver. - Collaborate with rehabilitation services on mobility goals if consulted  - Perform Range of Motion times a day. - Reposition patient every  hours.   - Dangle patient  times a day  - Stand patient  times a day  - Ambulate patient  times a day  - Out of bed to chair  times a day   - Out of bed for meals  times a day  - Out of bed for toileting  - Record patient progress and toleration of activity level   Outcome: Progressing     Problem: DISCHARGE PLANNING  Goal: Discharge to home or other facility with appropriate resources  Description: INTERVENTIONS:  - Identify barriers to discharge w/patient and caregiver  - Arrange for needed discharge resources and transportation as appropriate  - Identify discharge learning needs (meds, wound care, etc.)  - Arrange for interpretive services to assist at discharge as needed  - Refer to Case Management Department for coordinating discharge planning if the patient needs post-hospital services based on physician/advanced practitioner order or complex needs related to functional status, cognitive ability, or social support system  Outcome: Progressing     Problem: Knowledge Deficit  Goal: Patient/family/caregiver demonstrates understanding of disease process, treatment plan, medications, and discharge instructions  Description: Complete learning assessment and assess knowledge base.   Interventions:  - Provide teaching at level of understanding  - Provide teaching via preferred learning methods  Outcome: Progressing
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
## 2023-07-11 NOTE — INCIDENTAL FINDINGS
The following findings require follow up:  Radiographic finding   Finding:   Small umbilical adipose containing hernia, which contains a short segment of nonobstructed noninflamed small bowel    Deformity of the left L1 transverse process, and left inferior pubic ramus, likely chronic. Discussed finding and follow-up with patient. Patient verbalized understanding.      Follow up should be done within 1 month(s)    Please notify the following clinician to assist with the follow up:   Dr. Mrage Matson

## 2023-07-12 ENCOUNTER — TRANSITIONAL CARE MANAGEMENT (OUTPATIENT)
Dept: FAMILY MEDICINE CLINIC | Facility: CLINIC | Age: 73
End: 2023-07-12

## 2023-07-19 ENCOUNTER — TELEPHONE (OUTPATIENT)
Dept: SURGERY | Facility: CLINIC | Age: 73
End: 2023-07-19
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
## 2023-07-20 ENCOUNTER — OFFICE VISIT (OUTPATIENT)
Dept: SURGERY | Facility: CLINIC | Age: 73
End: 2023-07-20
Payer: COMMERCIAL

## 2023-07-20 VITALS
HEIGHT: 71 IN | SYSTOLIC BLOOD PRESSURE: 136 MMHG | BODY MASS INDEX: 26.08 KG/M2 | TEMPERATURE: 97.3 F | RESPIRATION RATE: 14 BRPM | WEIGHT: 186.3 LBS | DIASTOLIC BLOOD PRESSURE: 73 MMHG | OXYGEN SATURATION: 97 % | HEART RATE: 68 BPM

## 2023-07-20 DIAGNOSIS — S27.0XXA CLOSED TRAUMATIC FRACTURE OF RIBS OF RIGHT SIDE WITH PNEUMOTHORAX: Primary | ICD-10-CM

## 2023-07-20 DIAGNOSIS — S22.49XA MULTIPLE RIB FRACTURES: ICD-10-CM

## 2023-07-20 DIAGNOSIS — S22.41XA CLOSED TRAUMATIC FRACTURE OF RIBS OF RIGHT SIDE WITH PNEUMOTHORAX: Primary | ICD-10-CM

## 2023-07-20 PROBLEM — J93.9 PNEUMOTHORAX: Status: ACTIVE | Noted: 2023-07-20

## 2023-07-20 PROCEDURE — 99212 OFFICE O/P EST SF 10 MIN: CPT | Performed by: NURSE PRACTITIONER

## 2023-07-20 RX ORDER — NALOXONE HYDROCHLORIDE 4 MG/.1ML
SPRAY NASAL
Qty: 1 EACH | Refills: 1 | Status: SHIPPED | OUTPATIENT
Start: 2023-07-20 | End: 2024-07-19

## 2023-07-20 RX ORDER — OXYCODONE HYDROCHLORIDE 10 MG/1
10 TABLET ORAL EVERY 6 HOURS PRN
Qty: 30 TABLET | Refills: 0 | Status: SHIPPED | OUTPATIENT
Start: 2023-07-20 | End: 2023-07-30
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
## 2023-07-20 NOTE — ASSESSMENT & PLAN NOTE
Patient s/p PTX with chest tube  - Last CXR in hospital on 7/10 did not show PTX  - No Need for further imaging  - Lungs CTA B/L throughout  - Continue IS

## 2023-07-28 ENCOUNTER — RA CDI HCC (OUTPATIENT)
Dept: OTHER | Facility: HOSPITAL | Age: 73
End: 2023-07-28

## 2023-07-28 NOTE — PROGRESS NOTES
[facility] coding opportunities       Chart reviewed, no opportunity found: CHART REVIEWED, NO OPPORTUNITY FOUND        Patients Insurance     Medicare Insurance: [employer]

## 2023-08-02 ENCOUNTER — APPOINTMENT (OUTPATIENT)
Dept: LAB | Facility: CLINIC | Age: 73
End: 2023-08-02
Payer: COMMERCIAL

## 2023-08-02 DIAGNOSIS — R73.01 IFG (IMPAIRED FASTING GLUCOSE): ICD-10-CM

## 2023-08-02 DIAGNOSIS — E78.2 MIXED HYPERLIPIDEMIA: ICD-10-CM

## 2023-08-02 DIAGNOSIS — I25.119 CORONARY ARTERY DISEASE INVOLVING NATIVE CORONARY ARTERY OF NATIVE HEART WITH ANGINA PECTORIS (HCC): ICD-10-CM

## 2023-08-02 LAB
ANION GAP SERPL CALCULATED.3IONS-SCNC: 0 MMOL/L
BUN SERPL-MCNC: 23 MG/DL (ref 5–25)
CALCIUM SERPL-MCNC: 8.6 MG/DL (ref 8.3–10.1)
CHLORIDE SERPL-SCNC: 112 MMOL/L (ref 96–108)
CHOLEST SERPL-MCNC: 102 MG/DL
CO2 SERPL-SCNC: 28 MMOL/L (ref 21–32)
CREAT SERPL-MCNC: 0.8 MG/DL (ref 0.6–1.3)
EST. AVERAGE GLUCOSE BLD GHB EST-MCNC: 151 MG/DL
GFR SERPL CREATININE-BSD FRML MDRD: 89 ML/MIN/1.73SQ M
GLUCOSE P FAST SERPL-MCNC: 119 MG/DL (ref 65–99)
HBA1C MFR BLD: 6.9 %
HDLC SERPL-MCNC: 37 MG/DL
LDLC SERPL CALC-MCNC: 44 MG/DL (ref 0–100)
NONHDLC SERPL-MCNC: 65 MG/DL
POTASSIUM SERPL-SCNC: 4.2 MMOL/L (ref 3.5–5.3)
SODIUM SERPL-SCNC: 140 MMOL/L (ref 135–147)
TRIGL SERPL-MCNC: 103 MG/DL

## 2023-08-02 PROCEDURE — 80061 LIPID PANEL: CPT

## 2023-08-02 PROCEDURE — 80048 BASIC METABOLIC PNL TOTAL CA: CPT

## 2023-08-02 PROCEDURE — 36415 COLL VENOUS BLD VENIPUNCTURE: CPT

## 2023-08-02 PROCEDURE — 83036 HEMOGLOBIN GLYCOSYLATED A1C: CPT

## 2023-08-03 ENCOUNTER — OFFICE VISIT (OUTPATIENT)
Dept: SURGERY | Facility: CLINIC | Age: 73
End: 2023-08-03

## 2023-08-03 VITALS
DIASTOLIC BLOOD PRESSURE: 76 MMHG | SYSTOLIC BLOOD PRESSURE: 125 MMHG | OXYGEN SATURATION: 98 % | RESPIRATION RATE: 12 BRPM | HEART RATE: 64 BPM | HEIGHT: 71 IN | WEIGHT: 186.4 LBS | TEMPERATURE: 98.3 F | BODY MASS INDEX: 26.1 KG/M2

## 2023-08-03 DIAGNOSIS — S27.0XXA CLOSED TRAUMATIC FRACTURE OF RIBS OF RIGHT SIDE WITH PNEUMOTHORAX: ICD-10-CM

## 2023-08-03 DIAGNOSIS — S22.41XA CLOSED TRAUMATIC FRACTURE OF RIBS OF RIGHT SIDE WITH PNEUMOTHORAX: ICD-10-CM

## 2023-08-03 DIAGNOSIS — S22.49XA MULTIPLE RIB FRACTURES: Primary | ICD-10-CM

## 2023-08-03 RX ORDER — OXYCODONE HYDROCHLORIDE 5 MG/1
5 TABLET ORAL EVERY 8 HOURS PRN
Qty: 30 TABLET | Refills: 0 | Status: SHIPPED | OUTPATIENT
Start: 2023-08-03 | End: 2023-08-13

## 2023-08-04 ENCOUNTER — OFFICE VISIT (OUTPATIENT)
Dept: CARDIOLOGY CLINIC | Facility: CLINIC | Age: 73
End: 2023-08-04
Payer: COMMERCIAL

## 2023-08-04 VITALS
SYSTOLIC BLOOD PRESSURE: 110 MMHG | HEIGHT: 71 IN | WEIGHT: 191 LBS | DIASTOLIC BLOOD PRESSURE: 72 MMHG | HEART RATE: 68 BPM | BODY MASS INDEX: 26.74 KG/M2

## 2023-08-04 DIAGNOSIS — E78.2 MIXED HYPERLIPIDEMIA: ICD-10-CM

## 2023-08-04 DIAGNOSIS — I25.119 CORONARY ARTERY DISEASE INVOLVING NATIVE CORONARY ARTERY OF NATIVE HEART WITH ANGINA PECTORIS (HCC): Primary | ICD-10-CM

## 2023-08-04 DIAGNOSIS — Z95.5 S/P DRUG ELUTING CORONARY STENT PLACEMENT: ICD-10-CM

## 2023-08-04 PROCEDURE — 99214 OFFICE O/P EST MOD 30 MIN: CPT | Performed by: INTERNAL MEDICINE

## 2023-08-04 RX ORDER — METOPROLOL SUCCINATE 25 MG/1
12.5 TABLET, EXTENDED RELEASE ORAL DAILY
Qty: 45 TABLET | Refills: 3
Start: 2023-08-04
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
## 2023-08-09 ENCOUNTER — OFFICE VISIT (OUTPATIENT)
Dept: FAMILY MEDICINE CLINIC | Facility: CLINIC | Age: 73
End: 2023-08-09
Payer: COMMERCIAL

## 2023-08-09 VITALS
SYSTOLIC BLOOD PRESSURE: 126 MMHG | DIASTOLIC BLOOD PRESSURE: 66 MMHG | OXYGEN SATURATION: 97 % | TEMPERATURE: 98.8 F | WEIGHT: 187.6 LBS | BODY MASS INDEX: 26.26 KG/M2 | RESPIRATION RATE: 16 BRPM | HEIGHT: 71 IN | HEART RATE: 68 BPM

## 2023-08-09 DIAGNOSIS — R73.01 IFG (IMPAIRED FASTING GLUCOSE): Primary | ICD-10-CM

## 2023-08-09 DIAGNOSIS — E78.2 MIXED HYPERLIPIDEMIA: ICD-10-CM

## 2023-08-09 DIAGNOSIS — I25.119 CORONARY ARTERY DISEASE INVOLVING NATIVE CORONARY ARTERY OF NATIVE HEART WITH ANGINA PECTORIS (HCC): ICD-10-CM

## 2023-08-09 PROBLEM — J93.9 PNEUMOTHORAX: Status: RESOLVED | Noted: 2023-07-20 | Resolved: 2023-08-09

## 2023-08-09 PROBLEM — Z09 HOSPITAL DISCHARGE FOLLOW-UP: Status: RESOLVED | Noted: 2021-03-30 | Resolved: 2023-08-09

## 2023-08-09 PROCEDURE — 99213 OFFICE O/P EST LOW 20 MIN: CPT | Performed by: FAMILY MEDICINE

## 2023-08-09 NOTE — ASSESSMENT & PLAN NOTE
EMILIA placed in mid circumflex and LAD 3/15 and 3/17. Pt goes to Cardiology, Dr. Poly Osei. Compliant with asa, lipitor 20 and metoprolol succ 25 nightly. Brilinta stopped 1 year after stent placement.
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
## 2023-08-10 ENCOUNTER — EVALUATION (OUTPATIENT)
Dept: PHYSICAL THERAPY | Facility: REHABILITATION | Age: 73
End: 2023-08-10
Payer: COMMERCIAL

## 2023-08-10 DIAGNOSIS — M54.6 THORACIC SPINE PAIN: Primary | ICD-10-CM

## 2023-08-10 PROCEDURE — 97110 THERAPEUTIC EXERCISES: CPT | Performed by: PHYSICAL THERAPIST

## 2023-08-10 PROCEDURE — 97161 PT EVAL LOW COMPLEX 20 MIN: CPT | Performed by: PHYSICAL THERAPIST
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
## 2023-10-15 DIAGNOSIS — R07.9 CHEST PAIN: ICD-10-CM

## 2023-10-15 DIAGNOSIS — I21.4 NSTEMI (NON-ST ELEVATED MYOCARDIAL INFARCTION) (HCC): ICD-10-CM

## 2023-10-16 RX ORDER — ATORVASTATIN CALCIUM 20 MG/1
20 TABLET, FILM COATED ORAL
Qty: 90 TABLET | Refills: 3 | Status: SHIPPED | OUTPATIENT
Start: 2023-10-16

## 2023-10-29 DIAGNOSIS — Z95.5 S/P DRUG ELUTING CORONARY STENT PLACEMENT: ICD-10-CM

## 2023-10-30 RX ORDER — METOPROLOL SUCCINATE 25 MG/1
12.5 TABLET, EXTENDED RELEASE ORAL DAILY
Qty: 45 TABLET | Refills: 0 | Status: SHIPPED | OUTPATIENT
Start: 2023-10-30

## 2023-11-06 ENCOUNTER — RA CDI HCC (OUTPATIENT)
Dept: OTHER | Facility: HOSPITAL | Age: 73
End: 2023-11-06

## 2023-11-06 NOTE — PROGRESS NOTES
[facility] coding opportunities       Chart reviewed, no opportunity found: CHART REVIEWED, NO OPPORTUNITY FOUND        Patients Insurance     Medicare Insurance: [employer]

## 2023-11-13 ENCOUNTER — OFFICE VISIT (OUTPATIENT)
Dept: FAMILY MEDICINE CLINIC | Facility: CLINIC | Age: 73
End: 2023-11-13
Payer: COMMERCIAL

## 2023-11-13 VITALS
SYSTOLIC BLOOD PRESSURE: 137 MMHG | TEMPERATURE: 97.9 F | OXYGEN SATURATION: 98 % | BODY MASS INDEX: 26.04 KG/M2 | RESPIRATION RATE: 16 BRPM | HEIGHT: 71 IN | HEART RATE: 64 BPM | WEIGHT: 186 LBS | DIASTOLIC BLOOD PRESSURE: 64 MMHG

## 2023-11-13 DIAGNOSIS — I25.119 CORONARY ARTERY DISEASE INVOLVING NATIVE CORONARY ARTERY OF NATIVE HEART WITH ANGINA PECTORIS (HCC): ICD-10-CM

## 2023-11-13 DIAGNOSIS — E55.9 VITAMIN D DEFICIENCY: ICD-10-CM

## 2023-11-13 DIAGNOSIS — E78.2 MIXED HYPERLIPIDEMIA: ICD-10-CM

## 2023-11-13 DIAGNOSIS — R35.1 NOCTURIA: ICD-10-CM

## 2023-11-13 DIAGNOSIS — Z23 ENCOUNTER FOR IMMUNIZATION: ICD-10-CM

## 2023-11-13 DIAGNOSIS — R73.01 IFG (IMPAIRED FASTING GLUCOSE): Primary | ICD-10-CM

## 2023-11-13 PROCEDURE — G0008 ADMIN INFLUENZA VIRUS VAC: HCPCS | Performed by: FAMILY MEDICINE

## 2023-11-13 PROCEDURE — 90662 IIV NO PRSV INCREASED AG IM: CPT | Performed by: FAMILY MEDICINE

## 2023-11-13 PROCEDURE — 99214 OFFICE O/P EST MOD 30 MIN: CPT | Performed by: FAMILY MEDICINE

## 2023-11-13 NOTE — PROGRESS NOTES
Name: Emeka Liz      : 1950      MRN: [id number]  Encounter Provider: Dominique Chaves MD  Encounter Date: 2023   Encounter department: [address]     1. IFG (impaired fasting glucose)  -     Comprehensive metabolic panel; Future  -     TSH, 3rd generation with Free T4 reflex; Future  -     Hemoglobin A1C; Future  -     Albumin / creatinine urine ratio; Future    2. Mixed hyperlipidemia  -     Comprehensive metabolic panel; Future  -     Lipid panel; Future    3. Coronary artery disease involving native coronary artery of native heart with angina pectoris (HCC)  -     CBC and differential; Future  -     Comprehensive metabolic panel; Future  -     TSH, 3rd generation with Free T4 reflex; Future  -     Lipid panel; Future  -     UA w Reflex to Microscopic w Reflex to Culture; Future; Expected date: 2023    4. Nocturia  -     PSA, Total Screen; Future    5. Vitamin D deficiency  -     Vitamin D 25 hydroxy; Future    6. Encounter for immunization  -     influenza vaccine, high-dose, PF 0.7 mL (FLUZONE HIGH-DOSE)      Regarding his impaired glucose, patient A1c today is 5.9. It was 6.9 on . Patient is delighted that him changing his diet he was able to bring the numbers down. Discussed with patient. Education. Avoid starches and sweets. We will recheck again in 3 months. Diet and exercise. Regarding his CAD D, patient is stable on his medical regimen. His LDL in the last blood work was less than 70. With his next blood work we will recheck it again also if he will get his LFTs checked. Regarding his nocturia patient will get a PSA as well. Patient will see a urologist if need be especially that he has a history of kidney stones and they have not been bothering him of late. Hydration. Patient education. Patient also get his vitamin D checked with his next blood work. Patient received the flu vaccine with us today.   RTO 3 months for his Medicare annual well visit and to get the blood work a week before that. BMI Counseling: Body mass index is 25.94 kg/m². The BMI is above normal. Nutrition recommendations include decreasing portion sizes, encouraging healthy choices of fruits and vegetables, consuming healthier snacks, limiting drinks that contain sugar, moderation in carbohydrate intake and reducing intake of cholesterol. Exercise recommendations include exercising 3-5 times per week. No pharmacotherapy was ordered. Patient referred to PCP. Rationale for BMI follow-up plan is due to patient being overweight or obese. Subjective      70-year-old male here for follow-up. Patient did blood work back in August and his A1c went up to 6.9. He was told he we will recheck his A1c today at the office. Patient does admit to indulging and a lot of sweets and hence why his A1c was high. But since he was informed he has changed his diet and is eager to see what the number will be today. Patient does have a history of CAD, is stable, and is followed up by his cardiologist.  Patient is amenable to getting the flu vaccine with us today. Per patient he already has received the COVID-19 booster and RSV vaccine. Review of Systems   Constitutional:  Negative for appetite change, chills, fatigue, fever and unexpected weight change. HENT:  Negative for congestion and sore throat. Eyes:  Negative for visual disturbance. Respiratory:  Negative for cough and shortness of breath. Cardiovascular:  Negative for chest pain and palpitations. Gastrointestinal:  Negative for abdominal pain, blood in stool, constipation, diarrhea, nausea and vomiting. Genitourinary:  Negative for dysuria and hematuria. Musculoskeletal:  Negative for arthralgias. Skin:  Negative for rash. Neurological:  Negative for dizziness and headaches. Psychiatric/Behavioral:  Negative for dysphoric mood. The patient is not nervous/anxious. Current Outpatient Medications on File Prior to Visit   Medication Sig   • aspirin (ECOTRIN LOW STRENGTH) 81 mg EC tablet Take 1 tablet (81 mg total) by mouth daily   • atorvastatin (LIPITOR) 20 mg tablet TAKE 1 TABLET BY MOUTH EVERY DAY WITH DINNER   • cholecalciferol (VITAMIN D3) 1,000 units tablet Take 1,000 Units by mouth daily   • metoprolol succinate (TOPROL-XL) 25 mg 24 hr tablet Take 0.5 tablets (12.5 mg total) by mouth daily   • acetaminophen (TYLENOL) 325 mg tablet Take 3 tablets (975 mg total) by mouth every 8 (eight) hours (Patient not taking: Reported on 11/13/2023)   • lidocaine (LIDODERM) 5 % Apply 1 patch topically over 12 hours daily Remove & Discard patch within 12 hours or as directed by MD Do not start before July 12, 2023. • methocarbamol (ROBAXIN) 750 mg tablet Take 1 tablet (750 mg total) by mouth every 6 (six) hours as needed for muscle spasms for up to 14 days   • naloxone (NARCAN) 4 mg/0.1 mL nasal spray Administer 1 spray into a nostril. If no response after 2-3 minutes, give another dose in the other nostril using a new spray. (Patient not taking: Reported on 8/9/2023)       Objective     /64 (BP Location: Left arm, Patient Position: Sitting, Cuff Size: Adult)   Pulse 64   Temp 97.9 °F (36.6 °C) (Temporal)   Resp 16   Ht 5' 11" (1.803 m)   Wt 84.4 kg (186 lb)   SpO2 98%   BMI 25.94 kg/m²     Physical Exam  Vitals reviewed. Constitutional:       General: He is not in acute distress. Appearance: Normal appearance. HENT:      Head: Normocephalic and atraumatic. Mouth/Throat:      Mouth: Mucous membranes are moist.      Pharynx: Oropharynx is clear. Eyes:      Extraocular Movements: Extraocular movements intact. Conjunctiva/sclera: Conjunctivae normal.   Neck:      Vascular: No carotid bruit. Cardiovascular:      Rate and Rhythm: Normal rate and regular rhythm. Heart sounds: Normal heart sounds. No murmur heard.   Pulmonary:      Effort: Pulmonary effort is normal.      Breath sounds: Normal breath sounds. No wheezing or rhonchi. Abdominal:      General: Bowel sounds are normal.      Palpations: Abdomen is soft. Tenderness: There is no abdominal tenderness. There is no right CVA tenderness or left CVA tenderness. Musculoskeletal:      Cervical back: Neck supple. Right lower leg: No edema. Left lower leg: No edema. Comments: No calf tenderness bilateral   Lymphadenopathy:      Cervical: No cervical adenopathy. Skin:     General: Skin is warm. Findings: No rash. Neurological:      General: No focal deficit present. Mental Status: He is alert and oriented to person, place, and time. Cranial Nerves: No cranial nerve deficit. Psychiatric:         Mood and Affect: Mood normal.         Behavior: Behavior normal.         Thought Content:  Thought content normal.       Guillermo Harris MD

## 2024-01-17 NOTE — ASSESSMENT & PLAN NOTE
"Pt will try diet and excerise  Discussed metformin today and pt declines at this time  "  (

## 2024-01-21 DIAGNOSIS — Z95.5 S/P DRUG ELUTING CORONARY STENT PLACEMENT: ICD-10-CM

## 2024-01-22 RX ORDER — METOPROLOL SUCCINATE 25 MG/1
12.5 TABLET, EXTENDED RELEASE ORAL DAILY
Qty: 45 TABLET | Refills: 2 | Status: SHIPPED | OUTPATIENT
Start: 2024-01-22

## 2024-01-31 ENCOUNTER — RA CDI HCC (OUTPATIENT)
Dept: OTHER | Facility: HOSPITAL | Age: 74
End: 2024-01-31

## 2024-02-19 ENCOUNTER — APPOINTMENT (OUTPATIENT)
Dept: LAB | Facility: CLINIC | Age: 74
End: 2024-02-19
Payer: COMMERCIAL

## 2024-02-19 DIAGNOSIS — E78.2 MIXED HYPERLIPIDEMIA: ICD-10-CM

## 2024-02-19 DIAGNOSIS — R35.1 NOCTURIA: ICD-10-CM

## 2024-02-19 DIAGNOSIS — I25.119 CORONARY ARTERY DISEASE INVOLVING NATIVE CORONARY ARTERY OF NATIVE HEART WITH ANGINA PECTORIS (HCC): ICD-10-CM

## 2024-02-19 DIAGNOSIS — E55.9 VITAMIN D DEFICIENCY: ICD-10-CM

## 2024-02-19 DIAGNOSIS — R73.01 IFG (IMPAIRED FASTING GLUCOSE): ICD-10-CM

## 2024-02-19 LAB
25(OH)D3 SERPL-MCNC: 25.1 NG/ML (ref 30–100)
ALBUMIN SERPL BCP-MCNC: 4 G/DL (ref 3.5–5)
ALP SERPL-CCNC: 85 U/L (ref 34–104)
ALT SERPL W P-5'-P-CCNC: 14 U/L (ref 7–52)
ANION GAP SERPL CALCULATED.3IONS-SCNC: 8 MMOL/L
AST SERPL W P-5'-P-CCNC: 19 U/L (ref 13–39)
BACTERIA UR QL AUTO: ABNORMAL /HPF
BASOPHILS # BLD AUTO: 0.06 THOUSANDS/ÂΜL (ref 0–0.1)
BASOPHILS NFR BLD AUTO: 1 % (ref 0–1)
BILIRUB SERPL-MCNC: 0.63 MG/DL (ref 0.2–1)
BILIRUB UR QL STRIP: NEGATIVE
BUN SERPL-MCNC: 21 MG/DL (ref 5–25)
CALCIUM SERPL-MCNC: 8.8 MG/DL (ref 8.4–10.2)
CHLORIDE SERPL-SCNC: 106 MMOL/L (ref 96–108)
CHOLEST SERPL-MCNC: 95 MG/DL
CLARITY UR: CLEAR
CO2 SERPL-SCNC: 28 MMOL/L (ref 21–32)
COLOR UR: ABNORMAL
CREAT SERPL-MCNC: 0.84 MG/DL (ref 0.6–1.3)
CREAT UR-MCNC: 131.4 MG/DL
EOSINOPHIL # BLD AUTO: 0.25 THOUSAND/ÂΜL (ref 0–0.61)
EOSINOPHIL NFR BLD AUTO: 4 % (ref 0–6)
ERYTHROCYTE [DISTWIDTH] IN BLOOD BY AUTOMATED COUNT: 12.4 % (ref 11.6–15.1)
EST. AVERAGE GLUCOSE BLD GHB EST-MCNC: 140 MG/DL
GFR SERPL CREATININE-BSD FRML MDRD: 88 ML/MIN/1.73SQ M
GLUCOSE P FAST SERPL-MCNC: 122 MG/DL (ref 65–99)
GLUCOSE UR STRIP-MCNC: NEGATIVE MG/DL
HBA1C MFR BLD: 6.5 %
HCT VFR BLD AUTO: 44.8 % (ref 36.5–49.3)
HDLC SERPL-MCNC: 38 MG/DL
HGB BLD-MCNC: 14.4 G/DL (ref 12–17)
HGB UR QL STRIP.AUTO: NEGATIVE
IMM GRANULOCYTES # BLD AUTO: 0.03 THOUSAND/UL (ref 0–0.2)
IMM GRANULOCYTES NFR BLD AUTO: 0 % (ref 0–2)
KETONES UR STRIP-MCNC: NEGATIVE MG/DL
LDLC SERPL CALC-MCNC: 44 MG/DL (ref 0–100)
LEUKOCYTE ESTERASE UR QL STRIP: NEGATIVE
LYMPHOCYTES # BLD AUTO: 1.85 THOUSANDS/ÂΜL (ref 0.6–4.47)
LYMPHOCYTES NFR BLD AUTO: 28 % (ref 14–44)
MCH RBC QN AUTO: 29 PG (ref 26.8–34.3)
MCHC RBC AUTO-ENTMCNC: 32.1 G/DL (ref 31.4–37.4)
MCV RBC AUTO: 90 FL (ref 82–98)
MICROALBUMIN UR-MCNC: <7 MG/L
MICROALBUMIN/CREAT 24H UR: <5 MG/G CREATININE (ref 0–30)
MONOCYTES # BLD AUTO: 0.61 THOUSAND/ÂΜL (ref 0.17–1.22)
MONOCYTES NFR BLD AUTO: 9 % (ref 4–12)
MUCOUS THREADS UR QL AUTO: ABNORMAL
NEUTROPHILS # BLD AUTO: 3.9 THOUSANDS/ÂΜL (ref 1.85–7.62)
NEUTS SEG NFR BLD AUTO: 58 % (ref 43–75)
NITRITE UR QL STRIP: NEGATIVE
NON-SQ EPI CELLS URNS QL MICRO: ABNORMAL /HPF
NONHDLC SERPL-MCNC: 57 MG/DL
NRBC BLD AUTO-RTO: 0 /100 WBCS
PH UR STRIP.AUTO: 6.5 [PH]
PLATELET # BLD AUTO: 247 THOUSANDS/UL (ref 149–390)
PMV BLD AUTO: 9.6 FL (ref 8.9–12.7)
POTASSIUM SERPL-SCNC: 5 MMOL/L (ref 3.5–5.3)
PROT SERPL-MCNC: 6.7 G/DL (ref 6.4–8.4)
PROT UR STRIP-MCNC: ABNORMAL MG/DL
PSA SERPL-MCNC: 5.81 NG/ML (ref 0–4)
RBC # BLD AUTO: 4.97 MILLION/UL (ref 3.88–5.62)
RBC #/AREA URNS AUTO: ABNORMAL /HPF
SODIUM SERPL-SCNC: 142 MMOL/L (ref 135–147)
SP GR UR STRIP.AUTO: 1.02 (ref 1–1.03)
TRIGL SERPL-MCNC: 63 MG/DL
TSH SERPL DL<=0.05 MIU/L-ACNC: 1.69 UIU/ML (ref 0.45–4.5)
UROBILINOGEN UR STRIP-ACNC: <2 MG/DL
WBC # BLD AUTO: 6.7 THOUSAND/UL (ref 4.31–10.16)
WBC #/AREA URNS AUTO: ABNORMAL /HPF

## 2024-02-19 PROCEDURE — G0103 PSA SCREENING: HCPCS

## 2024-02-19 PROCEDURE — 82570 ASSAY OF URINE CREATININE: CPT

## 2024-02-19 PROCEDURE — 83036 HEMOGLOBIN GLYCOSYLATED A1C: CPT

## 2024-02-19 PROCEDURE — 82306 VITAMIN D 25 HYDROXY: CPT

## 2024-02-19 PROCEDURE — 82043 UR ALBUMIN QUANTITATIVE: CPT

## 2024-02-19 PROCEDURE — 80053 COMPREHEN METABOLIC PANEL: CPT

## 2024-02-19 PROCEDURE — 81001 URINALYSIS AUTO W/SCOPE: CPT

## 2024-02-19 PROCEDURE — 36415 COLL VENOUS BLD VENIPUNCTURE: CPT

## 2024-02-19 PROCEDURE — 84443 ASSAY THYROID STIM HORMONE: CPT

## 2024-02-19 PROCEDURE — 85025 COMPLETE CBC W/AUTO DIFF WBC: CPT

## 2024-02-19 PROCEDURE — 80061 LIPID PANEL: CPT

## 2024-02-22 ENCOUNTER — OFFICE VISIT (OUTPATIENT)
Dept: FAMILY MEDICINE CLINIC | Facility: CLINIC | Age: 74
End: 2024-02-22
Payer: COMMERCIAL

## 2024-02-22 VITALS
SYSTOLIC BLOOD PRESSURE: 131 MMHG | OXYGEN SATURATION: 99 % | WEIGHT: 188 LBS | DIASTOLIC BLOOD PRESSURE: 72 MMHG | BODY MASS INDEX: 26.32 KG/M2 | RESPIRATION RATE: 16 BRPM | HEIGHT: 71 IN | HEART RATE: 73 BPM

## 2024-02-22 DIAGNOSIS — E55.9 VITAMIN D DEFICIENCY: ICD-10-CM

## 2024-02-22 DIAGNOSIS — R73.01 IFG (IMPAIRED FASTING GLUCOSE): Primary | ICD-10-CM

## 2024-02-22 DIAGNOSIS — I25.119 CORONARY ARTERY DISEASE INVOLVING NATIVE CORONARY ARTERY OF NATIVE HEART WITH ANGINA PECTORIS (HCC): ICD-10-CM

## 2024-02-22 DIAGNOSIS — R97.20 ELEVATED PSA: ICD-10-CM

## 2024-02-22 DIAGNOSIS — E78.2 MIXED HYPERLIPIDEMIA: ICD-10-CM

## 2024-02-22 PROCEDURE — 99214 OFFICE O/P EST MOD 30 MIN: CPT | Performed by: FAMILY MEDICINE

## 2024-02-22 NOTE — PROGRESS NOTES
Name: Colin Pérez      : 1950      MRN: [id number]  Encounter Provider: Yomi Hope MD  Encounter Date: 2024   Encounter department: [facility]    Assessment & Plan     1. IFG (impaired fasting glucose)  -     Hemoglobin A1C; Future; Expected date: 2024  -     Basic metabolic panel; Future; Expected date: 2024    2. Mixed hyperlipidemia    3. Elevated PSA  -     Ambulatory Referral to Urology; Future    4. Vitamin D deficiency    5. Coronary artery disease involving native coronary artery of native heart with angina pectoris (HCC)      Regarding his prediabetes his glucose was 122 and his A1c went up to 6.5 from 5.9 at the office and his November visit with me.  Of note patient is A1c on  last year was 6.9.  Patient does admit to snacking on dried fruit etc.  Patient is not taking any medication for his sugar also.  Patient education.  Patient discussion.  Patient will abstain from starches and sweets of any kind.  Exercise as tolerated.  And we will recheck it again in 3 months.  Patient is told that if his 6.5 or above then we will consider taking metformin.  Patient understood, will comply and will follow-up.  Regarding his hyperlipidemia patient LFTs are normal and he is at goal with his LDL which needs to be less than 70 and his is 44.  His his HDL is 38 because his total cholesterol is 95.  Patient will continue the statin.  Low-fat diet and exercise.  Patient has no symptoms of taking a statin.  Regarding his elevated PSA of 5.8 which was 3.3 back in 2018, discussed with patient.  And patient send to the urologist.  Patient has no acute symptoms of UTI or prostatitis.  Regarding his vitamin D deficiency patient will take 2000 units instead of 1000 units units of vitamin D3.  Will continue to monitor recheck it again in 6 months or so.  Regarding his CAD patient is on an aspirin and a statin.  His LDL is at goal.  Patient has no  acute symptoms.  Discussed with patient.  And patient will follow-up with his cardiologist as scheduled.  RTO 3 months for his Medicare annual well visit and do a BMP and hemoglobin A1c a week before.           Subjective      73-year-old male here for an evaluation of his prediabetes.  Patient did blood work and urine.  Patient is up-to-date with his vaccines.  Patient has been seen by a urologist.  Patient also sees cardiology for CAD.  No current complaints of his CAD or urinary.      Review of Systems   Constitutional:  Negative for chills, fatigue, fever and unexpected weight change.   HENT:  Negative for congestion and sore throat.    Eyes:  Negative for visual disturbance.   Respiratory:  Negative for cough and shortness of breath.    Cardiovascular:  Negative for chest pain and palpitations.   Gastrointestinal:  Negative for abdominal pain.   Genitourinary:  Negative for dysuria and hematuria.   Neurological:  Negative for dizziness and headaches.       Current Outpatient Medications on File Prior to Visit   Medication Sig   • aspirin (ECOTRIN LOW STRENGTH) 81 mg EC tablet Take 1 tablet (81 mg total) by mouth daily   • atorvastatin (LIPITOR) 20 mg tablet TAKE 1 TABLET BY MOUTH EVERY DAY WITH DINNER   • cholecalciferol (VITAMIN D3) 1,000 units tablet Take 1,000 Units by mouth daily   • metoprolol succinate (TOPROL-XL) 25 mg 24 hr tablet TAKE 1/2 TABLET BY MOUTH EVERY DAY   • [DISCONTINUED] acetaminophen (TYLENOL) 325 mg tablet Take 3 tablets (975 mg total) by mouth every 8 (eight) hours (Patient not taking: Reported on 11/13/2023)   • [DISCONTINUED] lidocaine (LIDODERM) 5 % Apply 1 patch topically over 12 hours daily Remove & Discard patch within 12 hours or as directed by MD Do not start before July 12, 2023.   • [DISCONTINUED] methocarbamol (ROBAXIN) 750 mg tablet Take 1 tablet (750 mg total) by mouth every 6 (six) hours as needed for muscle spasms for up to 14 days   • [DISCONTINUED] naloxone (NARCAN) 4  "mg/0.1 mL nasal spray Administer 1 spray into a nostril. If no response after 2-3 minutes, give another dose in the other nostril using a new spray. (Patient not taking: Reported on 8/9/2023)       Objective     /72 (BP Location: Left arm, Patient Position: Sitting, Cuff Size: Adult)   Pulse 73   Resp 16   Ht 5' 11\" (1.803 m)   Wt 85.3 kg (188 lb)   SpO2 99%   BMI 26.22 kg/m²     Physical Exam  Vitals reviewed.   Constitutional:       General: He is not in acute distress.     Appearance: Normal appearance. He is not ill-appearing.   HENT:      Head: Normocephalic and atraumatic.      Mouth/Throat:      Mouth: Mucous membranes are moist.   Eyes:      Extraocular Movements: Extraocular movements intact.      Conjunctiva/sclera: Conjunctivae normal.   Neck:      Vascular: No carotid bruit.   Cardiovascular:      Rate and Rhythm: Normal rate and regular rhythm.   Pulmonary:      Effort: Pulmonary effort is normal.      Breath sounds: Normal breath sounds.   Musculoskeletal:      Right lower leg: No edema.      Left lower leg: No edema.      Comments: No calf tenderness bilateral.   Skin:     General: Skin is warm.   Neurological:      General: No focal deficit present.      Mental Status: He is alert and oriented to person, place, and time.   Psychiatric:         Mood and Affect: Mood normal.         Behavior: Behavior normal.       Yomi Hope MD    "

## 2024-04-22 ENCOUNTER — PATIENT MESSAGE (OUTPATIENT)
Dept: UROLOGY | Facility: AMBULATORY SURGERY CENTER | Age: 74
End: 2024-04-22

## 2024-04-26 ENCOUNTER — TELEPHONE (OUTPATIENT)
Dept: UROLOGY | Facility: CLINIC | Age: 74
End: 2024-04-26

## 2024-04-26 DIAGNOSIS — R97.20 ELEVATED PSA: Primary | ICD-10-CM

## 2024-04-26 NOTE — TELEPHONE ENCOUNTER
"Can we have him repeat PSA prior to appointment?  Thank you.    Regarding: upcoming appointment  Contact: [phone]  ----- Message from Rosalind Singh LPN sent at 4/26/2024 11:56 AM EDT -----       ----- Message sent from Rosalind Singh LPN to Colin Pérez at 4/26/2024 11:55 AM -----   Good morning Colin,    My name is Rosalind and I am a nurse with [facility] urology. I will let the provider know that you have a past history.     Thank you and have a great weekend!       ----- Message -----       From:Colin Pérez       Sent:4/26/2024 11:31 AM EDT         To:Patient Medical Advice Request Message List    Subject:upcoming appointment    Jaime Thomas - thought I'd bring it up so you could let the doctor know there is a past history, in case he might be interested. Of course it's  then up to him to decide if he wants to go to the archive.        ----- Message -----       From:Martha HORAN       Sent:4/25/2024  5:02 PM EDT         To:Colin Pérez    Subject:upcoming appointment    Good afternoon Colin. My name is Martha the medical assistant for urology. Our current computer system goes back three years (its a newer system) however anything further than that is in \"ARCHIVED\" records and the doctors only have access to that. I hope this information helps.       ----- Message -----       From:Colin Pérez       Sent:4/25/2024  4:43 PM EDT         To:Patient Medical Advice Request Message List    Subject:upcoming appointment    Depending on how far back your records go - you may also have records from when I was seeing Dr Bland after Lithotripsy performed in 2007.  I had a few annual visits after that as well.       ----- Message -----       From:Martha HORAN       Sent:4/22/2024  2:00 PM EDT         To:Colin Pérez    Subject:upcoming appointment    Your blood work is viewable and the provider will discuss them with you at your appointment. Thank you, TOY Thomas       ----- Message -----       " From:Colin Pérez       Sent:4/22/2024  1:43 PM EDT         To:Patient Medical Advice Request Message List    Subject:upcoming appointment    Yes New Patient. My primary suggested making an appointment with a urologist after my most recent visit and bloodwork that included a PSA.  Let me know if the doctor cannot review those results.         ----- Message -----       From:Martha HORAN       Sent:4/22/2024  1:20 PM EDT         To:Colin Pérez    Subject:upcoming appointment    Good afternoon Colin. Are you a new patient with us? Has it been more than three years since we've seen you? If the answer is yes, we cannot order any testing until you are established with our office. I hope this answers your question. Thank you, TOY Thomas        ----- Message -----       From:Colin Pérez       Sent:4/22/2024 12:28 PM EDT         To:Raji Bland MD    Subject:upcoming appointment    Would it be desirable before my May 2 appointment to get a new PSA test

## 2024-04-29 NOTE — TELEPHONE ENCOUNTER
Patient called back stating that he received a message regarding limitations prior to PSA lab drawn.    Pt states that he's unable to do that. Patient is a regular bike ride, and rides daily.     Pt is questioning if provider can see him without the results on hand?    Please review.    Call back [phone]

## 2024-04-29 NOTE — TELEPHONE ENCOUNTER
LM for Colin that he should repeat his PSA prior to appointment with Dr Bland no 5/2 @ 9;30 - no sex or ridin ,awn mower or bike 3 days prior to testing

## 2024-04-30 NOTE — TELEPHONE ENCOUNTER
Left message per communication form advising patient of still being able to come for his appointment with Dr. Bland on May 2 and he can discuss further. Office number provided with any questions or concerns.

## 2024-05-01 ENCOUNTER — APPOINTMENT (OUTPATIENT)
Dept: LAB | Facility: CLINIC | Age: 74
End: 2024-05-01
Payer: COMMERCIAL

## 2024-05-01 DIAGNOSIS — R97.20 ELEVATED PSA: ICD-10-CM

## 2024-05-01 LAB — PSA SERPL-MCNC: 3.91 NG/ML (ref 0–4)

## 2024-05-01 PROCEDURE — 36415 COLL VENOUS BLD VENIPUNCTURE: CPT

## 2024-05-01 PROCEDURE — 84153 ASSAY OF PSA TOTAL: CPT

## 2024-05-02 ENCOUNTER — OFFICE VISIT (OUTPATIENT)
Dept: UROLOGY | Facility: AMBULATORY SURGERY CENTER | Age: 74
End: 2024-05-02
Payer: COMMERCIAL

## 2024-05-02 VITALS
BODY MASS INDEX: 26.08 KG/M2 | HEART RATE: 67 BPM | OXYGEN SATURATION: 100 % | WEIGHT: 187 LBS | SYSTOLIC BLOOD PRESSURE: 120 MMHG | DIASTOLIC BLOOD PRESSURE: 70 MMHG

## 2024-05-02 DIAGNOSIS — R97.20 ELEVATED PSA: ICD-10-CM

## 2024-05-02 PROCEDURE — 99204 OFFICE O/P NEW MOD 45 MIN: CPT | Performed by: UROLOGY

## 2024-05-02 PROCEDURE — 1159F MED LIST DOCD IN RCRD: CPT | Performed by: UROLOGY

## 2024-05-02 PROCEDURE — 1160F RVW MEDS BY RX/DR IN RCRD: CPT | Performed by: UROLOGY

## 2024-05-02 NOTE — PROGRESS NOTES
5/2/2024    Colin Pérez  1950  [id number]      1. Elevated PSA  Assessment & Plan:  PSA 5.81 in 2/2024 - of note, pt reports that he bikes 30 miles daily and was not aware to refrain from this or sexual activity prior to testing.  Repeat PSA testing 5/2024 showed decreased to normal level at 3.91.  No personal or family hx of enlarged prostate or prostate cancer.  Denies any urinary symptoms today.  Prostate exam WNL today.     Plan:  - AUA prostate screening guidelines reviewed but as patient is overall healthy, he is wishes to continue annual PSA evaluation with PCP.  - follow up urology prn     Orders:  -     Ambulatory Referral to Urology           History of Present Illness  Colin is a 73 y.o. male with PMHx of kidney stones s/p lithotripsy 2007 presents for evaluation of elevated PSA of 5.81 from 2/2024 with recheck 5/1/24 normalized at 3.91. Pt reports that he does 30 mile bike rides daily and was not notified to refrain from riding or sexual intercourse/stimulation 3-4 days prior to completing PSA. No hx of elevated PSA in the past, last was 9/2018 was 3.3 and 1.8 in 2014. Denies any urinary symptoms, hematuria, weak stream, hesitancy. No personal or family hx of prostate cancer or enlarged prostate.     No complaints at this time.      AUA SYMPTOM SCORE      Flowsheet Row Most Recent Value   AUA SYMPTOM SCORE    How often have you had a sensation of not emptying your bladder completely after you finished urinating? 0 (P)    How often have you had to urinate again less than two hours after you finished urinating? 1 (P)    How often have you found you stopped and started again several times when you urinate? 0 (P)    How often have you found it difficult to postpone urination? 1 (P)    How often have you had a weak urinary stream? 0 (P)    How often have you had to push or strain to begin urination? 0 (P)    How many times did you most typically get up to urinate from the time you went to bed at  night until the time you got up in the morning? 1 (P)    Quality of Life: If you were to spend the rest of your life with your urinary condition just the way it is now, how would you feel about that? 1 (P)    AUA SYMPTOM SCORE 3 (P)             Review of Systems   Constitutional: Negative.    HENT: Negative.     Respiratory: Negative.     Cardiovascular: Negative.    Gastrointestinal: Negative.    Genitourinary:  Negative for difficulty urinating, dysuria, frequency, hematuria and urgency.        As per HPI   Musculoskeletal: Negative.    Skin: Negative.    Neurological: Negative.    Hematological: Negative.        Past Medical History  Past Medical History:   Diagnosis Date   • Coronary artery disease    • Femur fracture, right (HCC) 2015   • Hyperlipidemia        Past Social History  Past Surgical History:   Procedure Laterality Date   • CARDIAC CATHETERIZATION     • CORONARY STENT PLACEMENT     • TOTAL HIP ARTHROPLASTY         Past Family History  Family History   Problem Relation Age of Onset   • Heart disease Mother    • Thyroid disease Mother    • Hypertension Mother    • Pancreatic cancer Father    • Lung cancer Maternal Aunt        Past Social history  Social History     Socioeconomic History   • Marital status: /Civil Union     Spouse name: Not on file   • Number of children: 1   • Years of education: Not on file   • Highest education level: Not on file   Occupational History   • Occupation: Retired    Tobacco Use   • Smoking status: Never   • Smokeless tobacco: Never   Vaping Use   • Vaping status: Never Used   Substance and Sexual Activity   • Alcohol use: Yes     Comment: occasionally   • Drug use: No   • Sexual activity: Yes   Other Topics Concern   • Not on file   Social History Narrative        Non smoker    No caffeine use    Weekly alcohol cosumption    No recreational drug use    Always wears seatbelts    Retired    Lives with wife    No living will    No Yazidi preference      Social Determinants of Health     Financial Resource Strain: Low Risk  (1/11/2023)    Overall Financial Resource Strain (CARDIA)    • Difficulty of Paying Living Expenses: Not hard at all   Food Insecurity: No Food Insecurity (7/10/2023)    Hunger Vital Sign    • Worried About Running Out of Food in the Last Year: Never true    • Ran Out of Food in the Last Year: Never true   Transportation Needs: No Transportation Needs (7/10/2023)    PRAPARE - Transportation    • Lack of Transportation (Medical): No    • Lack of Transportation (Non-Medical): No   Physical Activity: Sufficiently Active (3/30/2021)    Exercise Vital Sign    • Days of Exercise per Week: 7 days    • Minutes of Exercise per Session: 80 min   Stress: No Stress Concern Present (3/30/2021)    [employer] [employer] - Occupational Stress Questionnaire    • Feeling of Stress : Not at all   Social Connections: Moderately Integrated (3/30/2021)    Social Connection and Isolation Panel [NHANES]    • Frequency of Communication with Friends and Family: More than three times a week    • Frequency of Social Gatherings with Friends and Family: More than three times a week    • Attends Pentec[employer]alism Services: Never    • Active Member of Clubs or Organizations: Yes    • Attends Club or Organization Meetings: Never    • Marital Status:    Intimate Partner Violence: Not At Risk (3/30/2021)    Humiliation, Afraid, Rape, and Kick questionnaire    • Fear of Current or Ex-Partner: No    • Emotionally Abused: No    • Physically Abused: No    • Sexually Abused: No   Housing Stability: Low Risk  (7/10/2023)    Housing Stability Vital Sign    • Unable to Pay for Housing in the Last Year: No    • Number of Places Lived in the Last Year: 1    • Unstable Housing in the Last Year: No     Social History     Tobacco Use   Smoking Status Never   Smokeless Tobacco Never       Current Medications  Current Outpatient Medications   Medication Sig Dispense Refill  "  • aspirin (ECOTRIN LOW STRENGTH) 81 mg EC tablet Take 1 tablet (81 mg total) by mouth daily 30 tablet 0   • atorvastatin (LIPITOR) 20 mg tablet TAKE 1 TABLET BY MOUTH EVERY DAY WITH DINNER 90 tablet 3   • cholecalciferol (VITAMIN D3) 1,000 units tablet Take 1,000 Units by mouth daily     • metoprolol succinate (TOPROL-XL) 25 mg 24 hr tablet TAKE 1/2 TABLET BY MOUTH EVERY DAY 45 tablet 2     No current facility-administered medications for this visit.       Allergies  Allergies   Allergen Reactions   • Penicillins Other (See Comments)     Patient reports that he had pcn as a child after a root canal it was \"ineffective\".        Past Medical History, Social History, Family History, medications and allergies were reviewed.    Vitals  Vitals:    05/02/24 0913   BP: 120/70   BP Location: Left arm   Patient Position: Sitting   Cuff Size: Adult   Pulse: 67   SpO2: 100%   Weight: 84.8 kg (187 lb)       Physical Exam  Vitals reviewed.   Cardiovascular:      Rate and Rhythm: Normal rate.   Pulmonary:      Effort: Pulmonary effort is normal.   Abdominal:      Palpations: Abdomen is soft.   Genitourinary:     Comments: Prostate minimally palpable, no nodule.  Musculoskeletal:         General: Normal range of motion.   Neurological:      Mental Status: He is alert and oriented to person, place, and time.   Psychiatric:         Mood and Affect: Mood normal.         Behavior: Behavior normal.           Results  Lab Results   Component Value Date    PSA 3.91 05/01/2024    PSA 5.81 (H) 02/19/2024    PSA 3.3 09/07/2018     Lab Results   Component Value Date    GLUCOSE 150 (H) 08/02/2015    CALCIUM 8.8 02/19/2024     08/02/2015    K 5.0 02/19/2024    CO2 28 02/19/2024     02/19/2024    BUN 21 02/19/2024    CREATININE 0.84 02/19/2024     Lab Results   Component Value Date    WBC 6.70 02/19/2024    HGB 14.4 02/19/2024    HCT 44.8 02/19/2024    MCV 90 02/19/2024     02/19/2024           "

## 2024-05-02 NOTE — ASSESSMENT & PLAN NOTE
PSA 5.81 in 2/2024 - of note, pt reports that he bikes 30 miles daily and was not aware to refrain from this or sexual activity prior to testing.  Repeat PSA testing 5/2024 showed decreased to normal level at 3.91.  No personal or family hx of enlarged prostate or prostate cancer.  Denies any urinary symptoms today.  Prostate exam WNL today.     Plan:  - AUA prostate screening guidelines reviewed but as patient is overall healthy, he is wishes to continue annual PSA evaluation with PCP.  - follow up urology prn

## 2024-05-16 ENCOUNTER — RA CDI HCC (OUTPATIENT)
Dept: OTHER | Facility: HOSPITAL | Age: 74
End: 2024-05-16

## 2024-05-22 ENCOUNTER — APPOINTMENT (OUTPATIENT)
Dept: LAB | Facility: CLINIC | Age: 74
End: 2024-05-22
Payer: COMMERCIAL

## 2024-05-23 ENCOUNTER — OFFICE VISIT (OUTPATIENT)
Dept: FAMILY MEDICINE CLINIC | Facility: CLINIC | Age: 74
End: 2024-05-23
Payer: COMMERCIAL

## 2024-05-23 VITALS
HEIGHT: 71 IN | SYSTOLIC BLOOD PRESSURE: 131 MMHG | RESPIRATION RATE: 16 BRPM | OXYGEN SATURATION: 99 % | TEMPERATURE: 98.2 F | WEIGHT: 185.8 LBS | DIASTOLIC BLOOD PRESSURE: 60 MMHG | BODY MASS INDEX: 26.01 KG/M2 | HEART RATE: 57 BPM

## 2024-05-23 DIAGNOSIS — E78.2 MIXED HYPERLIPIDEMIA: ICD-10-CM

## 2024-05-23 DIAGNOSIS — I25.119 CORONARY ARTERY DISEASE INVOLVING NATIVE CORONARY ARTERY OF NATIVE HEART WITH ANGINA PECTORIS (HCC): ICD-10-CM

## 2024-05-23 DIAGNOSIS — R97.20 ELEVATED PSA: ICD-10-CM

## 2024-05-23 DIAGNOSIS — E55.9 VITAMIN D DEFICIENCY: ICD-10-CM

## 2024-05-23 DIAGNOSIS — Z00.00 MEDICARE ANNUAL WELLNESS VISIT, SUBSEQUENT: Primary | ICD-10-CM

## 2024-05-23 DIAGNOSIS — E11.65 TYPE 2 DIABETES MELLITUS WITH HYPERGLYCEMIA, WITHOUT LONG-TERM CURRENT USE OF INSULIN (HCC): ICD-10-CM

## 2024-05-23 PROBLEM — R73.01 IFG (IMPAIRED FASTING GLUCOSE): Status: RESOLVED | Noted: 2018-09-06 | Resolved: 2024-05-23

## 2024-05-23 PROCEDURE — 99213 OFFICE O/P EST LOW 20 MIN: CPT | Performed by: FAMILY MEDICINE

## 2024-05-23 PROCEDURE — G0439 PPPS, SUBSEQ VISIT: HCPCS | Performed by: FAMILY MEDICINE

## 2024-05-23 RX ORDER — METFORMIN HYDROCHLORIDE 500 MG/1
500 TABLET, EXTENDED RELEASE ORAL
Qty: 90 TABLET | Refills: 1 | Status: SHIPPED | OUTPATIENT
Start: 2024-05-23
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
## 2024-06-12 ENCOUNTER — HOSPITAL ENCOUNTER (OUTPATIENT)
Dept: RADIOLOGY | Facility: HOSPITAL | Age: 74
Discharge: HOME | End: 2024-06-12
Attending: FAMILY MEDICINE
Payer: COMMERCIAL

## 2024-06-12 DIAGNOSIS — E11.65 TYPE 2 DIABETES MELLITUS WITH HYPERGLYCEMIA, WITHOUT LONG-TERM CURRENT USE OF INSULIN (HCC): ICD-10-CM

## 2024-06-12 PROCEDURE — 76706 US ABDL AORTA SCREEN AAA: CPT

## 2024-08-21 DIAGNOSIS — E11.65 TYPE 2 DIABETES MELLITUS WITH HYPERGLYCEMIA, WITHOUT LONG-TERM CURRENT USE OF INSULIN (HCC): ICD-10-CM

## 2024-08-21 RX ORDER — METFORMIN HCL 500 MG
500 TABLET, EXTENDED RELEASE 24 HR ORAL
Qty: 90 TABLET | Refills: 1 | Status: SHIPPED | OUTPATIENT
Start: 2024-08-21

## 2024-08-23 ENCOUNTER — OFFICE VISIT (OUTPATIENT)
Dept: CARDIOLOGY CLINIC | Facility: CLINIC | Age: 74
End: 2024-08-23
Payer: COMMERCIAL

## 2024-08-23 VITALS
HEIGHT: 71 IN | HEART RATE: 61 BPM | WEIGHT: 180 LBS | DIASTOLIC BLOOD PRESSURE: 58 MMHG | SYSTOLIC BLOOD PRESSURE: 118 MMHG | BODY MASS INDEX: 25.2 KG/M2

## 2024-08-23 DIAGNOSIS — I25.119 CORONARY ARTERY DISEASE INVOLVING NATIVE CORONARY ARTERY OF NATIVE HEART WITH ANGINA PECTORIS (HCC): Primary | ICD-10-CM

## 2024-08-23 DIAGNOSIS — E78.2 MIXED HYPERLIPIDEMIA: ICD-10-CM

## 2024-08-23 DIAGNOSIS — I25.10 CORONARY ARTERY DISEASE INVOLVING NATIVE CORONARY ARTERY OF NATIVE HEART WITHOUT ANGINA PECTORIS: ICD-10-CM

## 2024-08-23 PROBLEM — R01.1 MURMUR: Status: RESOLVED | Noted: 2021-03-16 | Resolved: 2024-08-23

## 2024-08-23 PROCEDURE — 93000 ELECTROCARDIOGRAM COMPLETE: CPT | Performed by: INTERNAL MEDICINE

## 2024-08-23 PROCEDURE — 99214 OFFICE O/P EST MOD 30 MIN: CPT | Performed by: INTERNAL MEDICINE
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
## 2024-08-23 NOTE — ASSESSMENT & PLAN NOTE
CAD with EMILIA x 2 to LCx and LAD (staged) in the setting of STEMI on March 17, 2021. Tolerating ASA, B-blocker, and statin.  S/p cardiac rehab and did well, trying to stay active.  Now off Brilinta since he is one year after stent placement.  Remains symptom-free and doing well.  No changes made today.
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
## 2024-10-30 DIAGNOSIS — R07.9 CHEST PAIN: ICD-10-CM

## 2024-10-30 DIAGNOSIS — I21.4 NSTEMI (NON-ST ELEVATED MYOCARDIAL INFARCTION) (HCC): ICD-10-CM

## 2024-10-30 RX ORDER — ATORVASTATIN CALCIUM 20 MG/1
20 TABLET, FILM COATED ORAL
Qty: 90 TABLET | Refills: 1 | Status: SHIPPED | OUTPATIENT
Start: 2024-10-30

## 2024-11-07 DIAGNOSIS — Z95.5 S/P DRUG ELUTING CORONARY STENT PLACEMENT: ICD-10-CM

## 2024-11-07 DIAGNOSIS — E11.65 TYPE 2 DIABETES MELLITUS WITH HYPERGLYCEMIA, WITHOUT LONG-TERM CURRENT USE OF INSULIN (HCC): ICD-10-CM

## 2024-11-07 RX ORDER — METFORMIN HYDROCHLORIDE 500 MG/1
500 TABLET, EXTENDED RELEASE ORAL
Qty: 90 TABLET | Refills: 1 | Status: SHIPPED | OUTPATIENT
Start: 2024-11-07

## 2024-11-08 RX ORDER — METOPROLOL SUCCINATE 25 MG/1
12.5 TABLET, EXTENDED RELEASE ORAL DAILY
Qty: 45 TABLET | Refills: 1 | Status: SHIPPED | OUTPATIENT
Start: 2024-11-08

## 2024-11-13 ENCOUNTER — RA CDI HCC (OUTPATIENT)
Dept: OTHER | Facility: HOSPITAL | Age: 74
End: 2024-11-13

## 2024-11-13 NOTE — PROGRESS NOTES
HCC coding opportunities          Chart Reviewed number of suggestions sent to Provider: 1     Patients Insurance     Medicare Insurance: [employer] Advantage          E11.3293: Type 2 diabetes mellitus with mild nonproliferative diabetic retinopathy without macular edema, bilateral [E11.3293]     Refer to Ophthalmology note from 1/2/2024 - please review and assess and document per MEAT if applicable for 2024

## 2024-11-21 ENCOUNTER — APPOINTMENT (OUTPATIENT)
Dept: LAB | Facility: CLINIC | Age: 74
End: 2024-11-21
Payer: COMMERCIAL

## 2024-11-21 DIAGNOSIS — E78.2 MIXED HYPERLIPIDEMIA: ICD-10-CM

## 2024-11-21 DIAGNOSIS — E11.65 TYPE 2 DIABETES MELLITUS WITH HYPERGLYCEMIA, WITHOUT LONG-TERM CURRENT USE OF INSULIN (HCC): ICD-10-CM

## 2024-11-21 DIAGNOSIS — E55.9 VITAMIN D DEFICIENCY: ICD-10-CM

## 2024-11-21 DIAGNOSIS — Z00.00 MEDICARE ANNUAL WELLNESS VISIT, SUBSEQUENT: ICD-10-CM

## 2024-11-21 LAB
25(OH)D3 SERPL-MCNC: 38.2 NG/ML (ref 30–100)
ALBUMIN SERPL BCG-MCNC: 4.5 G/DL (ref 3.5–5)
ALP SERPL-CCNC: 90 U/L (ref 34–104)
ALT SERPL W P-5'-P-CCNC: 26 U/L (ref 7–52)
ANION GAP SERPL CALCULATED.3IONS-SCNC: 9 MMOL/L (ref 4–13)
AST SERPL W P-5'-P-CCNC: 26 U/L (ref 13–39)
BILIRUB SERPL-MCNC: 0.93 MG/DL (ref 0.2–1)
BUN SERPL-MCNC: 24 MG/DL (ref 5–25)
CALCIUM SERPL-MCNC: 8.9 MG/DL (ref 8.4–10.2)
CHLORIDE SERPL-SCNC: 105 MMOL/L (ref 96–108)
CHOLEST SERPL-MCNC: 124 MG/DL (ref ?–200)
CO2 SERPL-SCNC: 27 MMOL/L (ref 21–32)
CREAT SERPL-MCNC: 0.76 MG/DL (ref 0.6–1.3)
CREAT UR-MCNC: 125.5 MG/DL
EST. AVERAGE GLUCOSE BLD GHB EST-MCNC: 134 MG/DL
GFR SERPL CREATININE-BSD FRML MDRD: 91 ML/MIN/1.73SQ M
GLUCOSE P FAST SERPL-MCNC: 129 MG/DL (ref 65–99)
HBA1C MFR BLD: 6.3 %
HDLC SERPL-MCNC: 49 MG/DL
LDLC SERPL CALC-MCNC: 59 MG/DL (ref 0–100)
MICROALBUMIN UR-MCNC: <7 MG/L
NONHDLC SERPL-MCNC: 75 MG/DL
POTASSIUM SERPL-SCNC: 5 MMOL/L (ref 3.5–5.3)
PROT SERPL-MCNC: 7.2 G/DL (ref 6.4–8.4)
SODIUM SERPL-SCNC: 141 MMOL/L (ref 135–147)
TRIGL SERPL-MCNC: 79 MG/DL (ref ?–150)

## 2024-11-21 PROCEDURE — 80061 LIPID PANEL: CPT

## 2024-11-21 PROCEDURE — 82043 UR ALBUMIN QUANTITATIVE: CPT

## 2024-11-21 PROCEDURE — 83036 HEMOGLOBIN GLYCOSYLATED A1C: CPT

## 2024-11-21 PROCEDURE — 82306 VITAMIN D 25 HYDROXY: CPT

## 2024-11-21 PROCEDURE — 36415 COLL VENOUS BLD VENIPUNCTURE: CPT

## 2024-11-21 PROCEDURE — 82570 ASSAY OF URINE CREATININE: CPT

## 2024-11-21 PROCEDURE — 80053 COMPREHEN METABOLIC PANEL: CPT

## 2024-11-22 ENCOUNTER — RESULTS FOLLOW-UP (OUTPATIENT)
Dept: FAMILY MEDICINE CLINIC | Facility: CLINIC | Age: 74
End: 2024-11-22

## 2024-11-25 ENCOUNTER — OFFICE VISIT (OUTPATIENT)
Dept: FAMILY MEDICINE CLINIC | Facility: CLINIC | Age: 74
End: 2024-11-25
Payer: COMMERCIAL

## 2024-11-25 VITALS
TEMPERATURE: 97.8 F | HEIGHT: 71 IN | OXYGEN SATURATION: 99 % | DIASTOLIC BLOOD PRESSURE: 59 MMHG | SYSTOLIC BLOOD PRESSURE: 122 MMHG | WEIGHT: 184.2 LBS | HEART RATE: 61 BPM | BODY MASS INDEX: 25.79 KG/M2 | RESPIRATION RATE: 16 BRPM

## 2024-11-25 DIAGNOSIS — E78.2 MIXED HYPERLIPIDEMIA: ICD-10-CM

## 2024-11-25 DIAGNOSIS — E11.65 TYPE 2 DIABETES MELLITUS WITH HYPERGLYCEMIA, WITHOUT LONG-TERM CURRENT USE OF INSULIN (HCC): Primary | ICD-10-CM

## 2024-11-25 PROCEDURE — 99214 OFFICE O/P EST MOD 30 MIN: CPT | Performed by: FAMILY MEDICINE

## 2024-11-25 PROCEDURE — G2211 COMPLEX E/M VISIT ADD ON: HCPCS | Performed by: FAMILY MEDICINE

## 2024-11-25 RX ORDER — LISINOPRIL 2.5 MG/1
2.5 TABLET ORAL
Qty: 30 TABLET | Refills: 5 | Status: SHIPPED | OUTPATIENT
Start: 2024-11-25

## 2024-11-25 NOTE — PROGRESS NOTES
Name: Colin Pérez      : 1950      MRN: [id number]  Encounter Provider: Yomi Hope MD  Encounter Date: 2024   Encounter department: [facility]  :  Assessment & Plan  Type 2 diabetes mellitus with hyperglycemia, without long-term current use of insulin ([facility])    Lab Results   Component Value Date    HGBA1C 6.3 (H) 2024   Improved.  His glucose was 129 but his A1c improved to 6.3 now when it was 6.5 back in May this year as well as 6.5 in February of this year.  It was 6.9 in August of last year.  Patient is now taking metformin since his last visit with me.  Patient is tolerating the medication.  Patient is also changing his diet.  Discussed with patient.  Patient diabetic foot exam was done today.  Patient advised to continue checking his feet daily.  Follow-up left ophthalmologist as scheduled.  Diet and exercise.  Less starches sweets and fats.  Hydrate well.  Continue current therapy.  And recheck labs again in 6 months.  Secondary to his diabetes patient is also advised the use of an ACE inhibitor or an ARB.  Discussed with patient.  Side effects Septra discussed with patient as well.  And patient will start lisinopril 2.5 mg at night.  If he encounters any side effects issues etc. patient will start the medication and give me a call.    Orders:    Diabetic foot exam; Future    lisinopril (ZESTRIL) 2.5 mg tablet; Take 1 tablet (2.5 mg total) by mouth daily with dinner    Comprehensive metabolic panel; Future    Hemoglobin A1C; Future    UA w Reflex to Microscopic w Reflex to Culture; Future    Albumin / creatinine urine ratio; Future    Mixed hyperlipidemia  Controlled.  LFTs normal.  His total cholesterol went up to 124 from 95 from February this year, triglycerides went up to 79 from 63, HDL went up to 49 from 38 and his LDL went up to 5944.  Patient was informed that his FLP is within normal limits it was better in February.  And to strive for that.   "Less fats starches and sweets.  Diet and exercise.  Continue current therapy.  And I will recheck his labs also in 6 months.  Orders:    Comprehensive metabolic panel; Future    Lipid panel; Future      RTO 6 months for his Medicare well visit and do the blood work and urine before.  Patient to see me prior to that for anything else in between.        Depression Screening and Follow-up Plan: Patient was screened for depression during today's encounter. They screened negative with a PHQ-2 score of 0.      History of Present Illness     74-year-old male here for an evaluation of his diabetes and hyperlipidemia.  Patient did blood work recently.  Patient is taking metformin now.  Doing well with it.  No side effects Septra.  Patient is also due for his diabetic foot exam.  Patient has seen ophthalmology already.  Patient is up-to-date with his vaccines including his flu vaccine and the updated COVID-19 vaccine.  Patient also received RSV vaccine.  No history of an adverse reaction to vaccines in the past.  Patient denies tobacco.      Review of Systems   Constitutional:  Negative for chills, fatigue, fever and unexpected weight change.   HENT:  Negative for congestion and sore throat.    Eyes:  Negative for visual disturbance.   Respiratory:  Negative for cough and shortness of breath.    Cardiovascular:  Negative for chest pain and palpitations.   Gastrointestinal:  Negative for abdominal pain, blood in stool, constipation, diarrhea, nausea and vomiting.   Genitourinary:  Negative for dysuria and hematuria.   Neurological:  Negative for dizziness and headaches.   Psychiatric/Behavioral:  Negative for dysphoric mood. The patient is not nervous/anxious.           Objective   /59 (BP Location: Left arm, Patient Position: Sitting, Cuff Size: Adult)   Pulse 61   Temp 97.8 °F (36.6 °C) (Temporal)   Resp 16   Ht 5' 11\" (1.803 m)   Wt 83.6 kg (184 lb 3.2 oz)   SpO2 99%   BMI 25.69 kg/m²      Physical Exam  Vitals " reviewed.   Constitutional:       General: He is not in acute distress.     Appearance: Normal appearance. He is not ill-appearing.   HENT:      Mouth/Throat:      Mouth: Mucous membranes are moist.   Neck:      Vascular: No carotid bruit.   Cardiovascular:      Rate and Rhythm: Normal rate and regular rhythm.      Pulses: no weak pulses.           Dorsalis pedis pulses are 2+ on the right side and 2+ on the left side.        Posterior tibial pulses are 2+ on the right side and 2+ on the left side.   Pulmonary:      Effort: Pulmonary effort is normal.      Breath sounds: Normal breath sounds.   Musculoskeletal:      Right lower leg: No edema.      Left lower leg: No edema.      Comments: No calf tenderness bilateral.   Feet:      Right foot:      Skin integrity: No ulcer, skin breakdown, erythema, warmth, callus or dry skin.      Left foot:      Skin integrity: No ulcer, skin breakdown, erythema, warmth, callus or dry skin.   Skin:     General: Skin is warm.   Neurological:      General: No focal deficit present.      Mental Status: He is alert and oriented to person, place, and time.   Psychiatric:         Mood and Affect: Mood normal.         Behavior: Behavior normal.         Thought Content: Thought content normal.       Diabetic Foot Exam    Patient's shoes and socks removed.    Right Foot/Ankle   Right Foot Inspection  Skin Exam: skin normal and skin intact. No dry skin, no warmth, no callus, no erythema, no maceration, no abnormal color, no pre-ulcer, no ulcer and no callus.     Toe Exam: ROM and strength within normal limits. No swelling, no tenderness, erythema and  no right toe deformity    Sensory   Monofilament testing: intact    Vascular  Capillary refills: < 3 seconds  The right DP pulse is 2+. The right PT pulse is 2+.     Left Foot/Ankle  Left Foot Inspection  Skin Exam: skin normal and skin intact. No dry skin, no warmth, no erythema, no maceration, normal color, no pre-ulcer, no ulcer and no  callus.     Toe Exam: ROM and strength within normal limits. No swelling, no tenderness, no erythema and no left toe deformity.     Sensory   Monofilament testing: intact    Vascular  Capillary refills: < 3 seconds  The left DP pulse is 2+. The left PT pulse is 2+.     Assign Risk Category  No deformity present  No loss of protective sensation  No weak pulses  Risk: 0

## 2024-11-25 NOTE — ASSESSMENT & PLAN NOTE
Lab Results   Component Value Date    HGBA1C 6.3 (H) 11/21/2024   Improved.  His glucose was 129 but his A1c improved to 6.3 now when it was 6.5 back in May this year as well as 6.5 in February of this year.  It was 6.9 in August of last year.  Patient is now taking metformin since his last visit with me.  Patient is tolerating the medication.  Patient is also changing his diet.  Discussed with patient.  Patient diabetic foot exam was done today.  Patient advised to continue checking his feet daily.  Follow-up left ophthalmologist as scheduled.  Diet and exercise.  Less starches sweets and fats.  Hydrate well.  Continue current therapy.  And recheck labs again in 6 months.  Secondary to his diabetes patient is also advised the use of an ACE inhibitor or an ARB.  Discussed with patient.  Side effects Septra discussed with patient as well.  And patient will start lisinopril 2.5 mg at night.  If he encounters any side effects issues etc. patient will start the medication and give me a call.    Orders:    Diabetic foot exam; Future    lisinopril (ZESTRIL) 2.5 mg tablet; Take 1 tablet (2.5 mg total) by mouth daily with dinner    Comprehensive metabolic panel; Future    Hemoglobin A1C; Future    UA w Reflex to Microscopic w Reflex to Culture; Future    Albumin / creatinine urine ratio; Future

## 2024-11-25 NOTE — ASSESSMENT & PLAN NOTE
Controlled.  LFTs normal.  His total cholesterol went up to 124 from 95 from February this year, triglycerides went up to 79 from 63, HDL went up to 49 from 38 and his LDL went up to 5944.  Patient was informed that his FLP is within normal limits it was better in February.  And to strive for that.  Less fats starches and sweets.  Diet and exercise.  Continue current therapy.  And I will recheck his labs also in 6 months.  Orders:    Comprehensive metabolic panel; Future    Lipid panel; Future

## 2024-12-18 DIAGNOSIS — E11.65 TYPE 2 DIABETES MELLITUS WITH HYPERGLYCEMIA, WITHOUT LONG-TERM CURRENT USE OF INSULIN (HCC): ICD-10-CM

## 2024-12-19 RX ORDER — LISINOPRIL 2.5 MG/1
2.5 TABLET ORAL
Qty: 90 TABLET | Refills: 1 | Status: SHIPPED | OUTPATIENT
Start: 2024-12-19

## 2025-04-18 ENCOUNTER — VBI (OUTPATIENT)
Dept: ADMINISTRATIVE | Facility: OTHER | Age: 75
End: 2025-04-18

## 2025-04-18 NOTE — TELEPHONE ENCOUNTER
04/18/25 12:00 PM     Chart reviewed for Diabetic Eye Exam was/were not submitted to the patient's insurance.     Luis Denise MA   [facility]

## 2025-05-02 DIAGNOSIS — I21.4 NSTEMI (NON-ST ELEVATED MYOCARDIAL INFARCTION) (HCC): ICD-10-CM

## 2025-05-02 DIAGNOSIS — R07.9 CHEST PAIN: ICD-10-CM

## 2025-05-02 DIAGNOSIS — Z95.5 S/P DRUG ELUTING CORONARY STENT PLACEMENT: ICD-10-CM

## 2025-05-02 RX ORDER — ATORVASTATIN CALCIUM 20 MG/1
20 TABLET, FILM COATED ORAL
Qty: 90 TABLET | Refills: 1 | Status: SHIPPED | OUTPATIENT
Start: 2025-05-02

## 2025-05-02 RX ORDER — METOPROLOL SUCCINATE 25 MG/1
12.5 TABLET, EXTENDED RELEASE ORAL DAILY
Qty: 45 TABLET | Refills: 1 | Status: SHIPPED | OUTPATIENT
Start: 2025-05-02

## 2025-05-28 ENCOUNTER — RA CDI HCC (OUTPATIENT)
Dept: OTHER | Facility: HOSPITAL | Age: 75
End: 2025-05-28

## 2025-05-28 NOTE — PROGRESS NOTES
HCC coding opportunities          Chart Reviewed number of suggestions sent to Provider: 1     Patients Insurance     Medicare Insurance: [employer] Advantage          E11.3293: Type 2 diabetes mellitus with mild nonproliferative diabetic retinopathy without macular edema, bilateral [E11.3293]      Refer to Ophthalmology note from 1/2/2024 - please review and assess and document per MEAT if applicable for 2025

## 2025-05-30 ENCOUNTER — PATIENT MESSAGE (OUTPATIENT)
Dept: FAMILY MEDICINE CLINIC | Facility: CLINIC | Age: 75
End: 2025-05-30

## 2025-05-31 ENCOUNTER — APPOINTMENT (OUTPATIENT)
Dept: LAB | Facility: CLINIC | Age: 75
End: 2025-05-31
Payer: COMMERCIAL

## 2025-05-31 DIAGNOSIS — E11.65 TYPE 2 DIABETES MELLITUS WITH HYPERGLYCEMIA, WITHOUT LONG-TERM CURRENT USE OF INSULIN (HCC): ICD-10-CM

## 2025-05-31 DIAGNOSIS — E78.2 MIXED HYPERLIPIDEMIA: ICD-10-CM

## 2025-05-31 LAB
ALBUMIN SERPL BCG-MCNC: 4.2 G/DL (ref 3.5–5)
ALP SERPL-CCNC: 82 U/L (ref 34–104)
ALT SERPL W P-5'-P-CCNC: 14 U/L (ref 7–52)
ANION GAP SERPL CALCULATED.3IONS-SCNC: 7 MMOL/L (ref 4–13)
AST SERPL W P-5'-P-CCNC: 17 U/L (ref 13–39)
BACTERIA UR QL AUTO: ABNORMAL /HPF
BILIRUB SERPL-MCNC: 0.83 MG/DL (ref 0.2–1)
BILIRUB UR QL STRIP: NEGATIVE
BUN SERPL-MCNC: 26 MG/DL (ref 5–25)
CALCIUM SERPL-MCNC: 9.1 MG/DL (ref 8.4–10.2)
CHLORIDE SERPL-SCNC: 105 MMOL/L (ref 96–108)
CHOLEST SERPL-MCNC: 104 MG/DL (ref ?–200)
CLARITY UR: CLEAR
CO2 SERPL-SCNC: 29 MMOL/L (ref 21–32)
COLOR UR: ABNORMAL
CREAT SERPL-MCNC: 0.81 MG/DL (ref 0.6–1.3)
CREAT UR-MCNC: 134.6 MG/DL
EST. AVERAGE GLUCOSE BLD GHB EST-MCNC: 140 MG/DL
GFR SERPL CREATININE-BSD FRML MDRD: 88 ML/MIN/1.73SQ M
GLUCOSE P FAST SERPL-MCNC: 118 MG/DL (ref 65–99)
GLUCOSE UR STRIP-MCNC: NEGATIVE MG/DL
HBA1C MFR BLD: 6.5 %
HDLC SERPL-MCNC: 42 MG/DL
HGB UR QL STRIP.AUTO: NEGATIVE
KETONES UR STRIP-MCNC: NEGATIVE MG/DL
LDLC SERPL CALC-MCNC: 49 MG/DL (ref 0–100)
LEUKOCYTE ESTERASE UR QL STRIP: NEGATIVE
MICROALBUMIN UR-MCNC: <7 MG/L
MUCOUS THREADS UR QL AUTO: ABNORMAL
NITRITE UR QL STRIP: NEGATIVE
NON-SQ EPI CELLS URNS QL MICRO: ABNORMAL /HPF
NONHDLC SERPL-MCNC: 62 MG/DL
PH UR STRIP.AUTO: 6 [PH]
POTASSIUM SERPL-SCNC: 4.6 MMOL/L (ref 3.5–5.3)
PROT SERPL-MCNC: 6.8 G/DL (ref 6.4–8.4)
PROT UR STRIP-MCNC: ABNORMAL MG/DL
RBC #/AREA URNS AUTO: ABNORMAL /HPF
SODIUM SERPL-SCNC: 141 MMOL/L (ref 135–147)
SP GR UR STRIP.AUTO: 1.03 (ref 1–1.03)
TRIGL SERPL-MCNC: 66 MG/DL (ref ?–150)
UROBILINOGEN UR STRIP-ACNC: <2 MG/DL
WBC #/AREA URNS AUTO: ABNORMAL /HPF

## 2025-05-31 PROCEDURE — 80053 COMPREHEN METABOLIC PANEL: CPT

## 2025-05-31 PROCEDURE — 80061 LIPID PANEL: CPT

## 2025-05-31 PROCEDURE — 36415 COLL VENOUS BLD VENIPUNCTURE: CPT

## 2025-05-31 PROCEDURE — 82043 UR ALBUMIN QUANTITATIVE: CPT

## 2025-05-31 PROCEDURE — 83036 HEMOGLOBIN GLYCOSYLATED A1C: CPT

## 2025-05-31 PROCEDURE — 81001 URINALYSIS AUTO W/SCOPE: CPT

## 2025-05-31 PROCEDURE — 82570 ASSAY OF URINE CREATININE: CPT

## 2025-06-03 ENCOUNTER — OFFICE VISIT (OUTPATIENT)
Dept: FAMILY MEDICINE CLINIC | Facility: CLINIC | Age: 75
End: 2025-06-03
Payer: COMMERCIAL

## 2025-06-03 VITALS
SYSTOLIC BLOOD PRESSURE: 130 MMHG | HEART RATE: 57 BPM | TEMPERATURE: 98.2 F | RESPIRATION RATE: 16 BRPM | HEIGHT: 71 IN | WEIGHT: 185.4 LBS | BODY MASS INDEX: 25.96 KG/M2 | OXYGEN SATURATION: 99 % | DIASTOLIC BLOOD PRESSURE: 61 MMHG

## 2025-06-03 DIAGNOSIS — I25.10 CORONARY ARTERY DISEASE INVOLVING NATIVE CORONARY ARTERY OF NATIVE HEART WITHOUT ANGINA PECTORIS: ICD-10-CM

## 2025-06-03 DIAGNOSIS — E11.3293 TYPE 2 DIABETES MELLITUS WITH BOTH EYES AFFECTED BY MILD NONPROLIFERATIVE RETINOPATHY WITHOUT MACULAR EDEMA, WITHOUT LONG-TERM CURRENT USE OF INSULIN (HCC): ICD-10-CM

## 2025-06-03 DIAGNOSIS — H91.93 HEARING DEFICIT, BILATERAL: ICD-10-CM

## 2025-06-03 DIAGNOSIS — Z23 NEED FOR VACCINATION: ICD-10-CM

## 2025-06-03 DIAGNOSIS — E78.2 MIXED HYPERLIPIDEMIA: ICD-10-CM

## 2025-06-03 DIAGNOSIS — R97.20 ELEVATED PSA: ICD-10-CM

## 2025-06-03 DIAGNOSIS — Z00.00 MEDICARE ANNUAL WELLNESS VISIT, SUBSEQUENT: Primary | ICD-10-CM

## 2025-06-03 DIAGNOSIS — E11.65 TYPE 2 DIABETES MELLITUS WITH HYPERGLYCEMIA, WITHOUT LONG-TERM CURRENT USE OF INSULIN (HCC): ICD-10-CM

## 2025-06-03 PROCEDURE — G0439 PPPS, SUBSEQ VISIT: HCPCS | Performed by: FAMILY MEDICINE

## 2025-06-03 PROCEDURE — 99214 OFFICE O/P EST MOD 30 MIN: CPT | Performed by: FAMILY MEDICINE

## 2025-06-03 PROCEDURE — G2211 COMPLEX E/M VISIT ADD ON: HCPCS | Performed by: FAMILY MEDICINE

## 2025-06-03 RX ORDER — LOSARTAN POTASSIUM 25 MG/1
25 TABLET ORAL DAILY
Qty: 30 TABLET | Refills: 5 | Status: SHIPPED | OUTPATIENT
Start: 2025-06-03

## 2025-06-03 RX ORDER — CHOLECALCIFEROL (VITAMIN D3) 50 MCG
TABLET ORAL
COMMUNITY
Start: 2024-11-26

## 2025-06-03 RX ORDER — ZOSTER VACCINE RECOMBINANT, ADJUVANTED 50 MCG/0.5
0.5 KIT INTRAMUSCULAR ONCE
Qty: 1 EACH | Refills: 1 | Status: SHIPPED | OUTPATIENT
Start: 2025-06-03 | End: 2025-06-03

## 2025-06-03 NOTE — ASSESSMENT & PLAN NOTE
Lab Results   Component Value Date    HGBA1C 6.5 (H) 05/31/2025   Controlled.  His glucose was 118 and his A1c is 6.5 now but it did go up from 6.3 from November last year.  Of note it was 6.5 in May of last year as well.  Advised patient have less starches sweets and fats.  Exercise as tolerated.  Continue current therapy.  Recheck labs again in 6 months.  Patient is given referral to his ophthalmologist.  Patient also reminded of checking his feet himself daily.    Orders:  •  Comprehensive metabolic panel; Future  •  Hemoglobin A1C; Future  •  Albumin / creatinine urine ratio; Future  •  losartan (COZAAR) 25 mg tablet; Take 1 tablet (25 mg total) by mouth before bedtime.  •  Ambulatory referral to Ophthalmology; Future

## 2025-06-03 NOTE — ASSESSMENT & PLAN NOTE
Lab Results   Component Value Date    HGBA1C 6.5 (H) 05/31/2025   Controlled.  His glucose was 118 and his A1c is 6.5 now but it did go up from 6.3 from November last year.  Of note it was 6.5 in May of last year as well.  Advised patient have less starches sweets and fats.  Exercise as tolerated.  Continue current therapy.  Recheck labs again in 6 months.  Patient is given referral to his ophthalmologist.  Patient also reminded of checking his feet himself daily.    Orders:  •  Comprehensive metabolic panel; Future  •  Hemoglobin A1C; Future  •  Albumin / creatinine urine ratio; Future  •  Ambulatory referral to Ophthalmology; Future

## 2025-06-03 NOTE — PATIENT INSTRUCTIONS
Medicare Preventive Visit Patient Instructions  Thank you for completing your Welcome to Medicare Visit or Medicare Annual Wellness Visit today. Your next wellness visit will be due in one year (6/4/2026).  The screening/preventive services that you may require over the next 5-10 years are detailed below. Some tests may not apply to you based off risk factors and/or age. Screening tests ordered at today's visit but not completed yet may show as past due. Also, please note that scanned in results may not display below.  Preventive Screenings:  Service Recommendations Previous Testing/Comments   Colorectal Cancer Screening  Colonoscopy    Fecal Occult Blood Test (FOBT)/Fecal Immunochemical Test (FIT)  Fecal DNA/Cologuard Test  Flexible Sigmoidoscopy Age: 45-75 years old   Colonoscopy: every 10 years (May be performed more frequently if at higher risk)  OR  FOBT/FIT: every 1 year  OR  Cologuard: every 3 years  OR  Sigmoidoscopy: every 5 years  Screening may be recommended earlier than age 45 if at higher risk for colorectal cancer. Also, an individualized decision between you and your healthcare provider will decide whether screening between the ages of 76-85 would be appropriate. Colonoscopy: 05/16/2022  FOBT/FIT: Not on file  Cologuard: Not on file  Sigmoidoscopy: Not on file    Screening Current     Prostate Cancer Screening Individualized decision between patient and health care provider in men between ages of 55-69   Medicare will cover every 12 months beginning on the day after your 50th birthday PSA: 3.91 ng/mL           Hepatitis C Screening Once for adults born between 1945 and 1965  More frequently in patients at high risk for Hepatitis C Hep C Antibody: 09/03/2019    Screening Current   Diabetes Screening 1-2 times per year if you're at risk for diabetes or have pre-diabetes Fasting glucose: 118 mg/dL (5/31/2025)  A1C: 6.5 % (5/31/2025)  Screening Not Indicated  History Diabetes   Cholesterol Screening Once  every 5 years if you don't have a lipid disorder. May order more often based on risk factors. Lipid panel: 05/31/2025  Screening Not Indicated  History Lipid Disorder      Other Preventive Screenings Covered by Medicare:  Abdominal Aortic Aneurysm (AAA) Screening: covered once if your at risk. You're considered to be at risk if you have a family history of AAA or a male between the age of 65-75 who smoking at least 100 cigarettes in your lifetime.  Lung Cancer Screening: covers low dose CT scan once per year if you meet all of the following conditions: (1) Age 55-77; (2) No signs or symptoms of lung cancer; (3) Current smoker or have quit smoking within the last 15 years; (4) You have a tobacco smoking history of at least 20 pack years (packs per day x number of years you smoked); (5) You get a written order from a healthcare provider.  Glaucoma Screening: covered annually if you're considered high risk: (1) You have diabetes OR (2) Family history of glaucoma OR (3)  aged 50 and older OR (4)  American aged 65 and older  Osteoporosis Screening: covered every 2 years if you meet one of the following conditions: (1) Have a vertebral abnormality; (2) On glucocorticoid therapy for more than 3 months; (3) Have primary hyperparathyroidism; (4) On osteoporosis medications and need to assess response to drug therapy.  HIV Screening: covered annually if you're between the age of 15-65. Also covered annually if you are younger than 15 and older than 65 with risk factors for HIV infection. For pregnant patients, it is covered up to 3 times per pregnancy.    Immunizations:  Immunization Recommendations   Influenza Vaccine Annual influenza vaccination during flu season is recommended for all persons aged >= 6 months who do not have contraindications   Pneumococcal Vaccine   * Pneumococcal conjugate vaccine = PCV13 (Prevnar 13), PCV15 (Vaxneuvance), PCV20 (Prevnar 20)  * Pneumococcal polysaccharide vaccine  = PPSV23 (Pneumovax) Adults 19-63 yo with certain risk factors or if 65+ yo  If never received any pneumonia vaccine: recommend Prevnar 20 (PCV20)  Give PCV20 if previously received 1 dose of PCV13 or PPSV23   Hepatitis B Vaccine 3 dose series if at intermediate or high risk (ex: diabetes, end stage renal disease, liver disease)   Respiratory syncytial virus (RSV) Vaccine - COVERED BY MEDICARE PART D  * RSVPreF3 (Arexvy) CDC recommends that adults 60 years of age and older may receive a single dose of RSV vaccine using shared clinical decision-making (SCDM)   Tetanus (Td) Vaccine - COST NOT COVERED BY MEDICARE PART B Following completion of primary series, a booster dose should be given every 10 years to maintain immunity against tetanus. Td may also be given as tetanus wound prophylaxis.   Tdap Vaccine - COST NOT COVERED BY MEDICARE PART B Recommended at least once for all adults. For pregnant patients, recommended with each pregnancy.   Shingles Vaccine (Shingrix) - COST NOT COVERED BY MEDICARE PART B  2 shot series recommended in those 19 years and older who have or will have weakened immune systems or those 50 years and older     Health Maintenance Due:      Topic Date Due   • Colorectal Cancer Screening  05/13/2032   • Hepatitis C Screening  Completed     Immunizations Due:      Topic Date Due   • COVID-19 Vaccine (9 - 2024-25 season) 03/13/2025     Advance Directives   What are advance directives?  Advance directives are legal documents that state your wishes and plans for medical care. These plans are made ahead of time in case you lose your ability to make decisions for yourself. Advance directives can apply to any medical decision, such as the treatments you want, and if you want to donate organs.   What are the types of advance directives?  There are many types of advance directives, and each state has rules about how to use them. You may choose a combination of any of the following:  Living will:  This is  a written record of the treatment you want. You can also choose which treatments you do not want, which to limit, and which to stop at a certain time. This includes surgery, medicine, IV fluid, and tube feedings.   Durable power of  for healthcare (DPAHC):  This is a written record that states who you want to make healthcare choices for you when you are unable to make them for yourself. This person, called a proxy, is usually a family member or a friend. You may choose more than 1 proxy.  Do not resuscitate (DNR) order:  A DNR order is used in case your heart stops beating or you stop breathing. It is a request not to have certain forms of treatment, such as CPR. A DNR order may be included in other types of advance directives.  Medical directive:  This covers the care that you want if you are in a coma, near death, or unable to make decisions for yourself. You can list the treatments you want for each condition. Treatment may include pain medicine, surgery, blood transfusions, dialysis, IV or tube feedings, and a ventilator (breathing machine).  Values history:  This document has questions about your views, beliefs, and how you feel and think about life. This information can help others choose the care that you would choose.  Why are advance directives important?  An advance directive helps you control your care. Although spoken wishes may be used, it is better to have your wishes written down. Spoken wishes can be misunderstood, or not followed. Treatments may be given even if you do not want them. An advance directive may make it easier for your family to make difficult choices about your care.   Weight Management   Why it is important to manage your weight:  Being overweight increases your risk of health conditions such as heart disease, high blood pressure, type 2 diabetes, and certain types of cancer. It can also increase your risk for osteoarthritis, sleep apnea, and other respiratory problems. Aim  for a slow, steady weight loss. Even a small amount of weight loss can lower your risk of health problems.  How to lose weight safely:  A safe and healthy way to lose weight is to eat fewer calories and get regular exercise. You can lose up about 1 pound a week by decreasing the number of calories you eat by 500 calories each day.   Healthy meal plan for weight management:  A healthy meal plan includes a variety of foods, contains fewer calories, and helps you stay healthy. A healthy meal plan includes the following:  Eat whole-grain foods more often.  A healthy meal plan should contain fiber. Fiber is the part of grains, fruits, and vegetables that is not broken down by your body. Whole-grain foods are healthy and provide extra fiber in your diet. Some examples of whole-grain foods are whole-wheat breads and pastas, oatmeal, brown rice, and bulgur.  Eat a variety of vegetables every day.  Include dark, leafy greens such as spinach, kale, oscar greens, and mustard greens. Eat yellow and orange vegetables such as carrots, sweet potatoes, and winter squash.   Eat a variety of fruits every day.  Choose fresh or canned fruit (canned in its own juice or light syrup) instead of juice. Fruit juice has very little or no fiber.  Eat low-fat dairy foods.  Drink fat-free (skim) milk or 1% milk. Eat fat-free yogurt and low-fat cottage cheese. Try low-fat cheeses such as mozzarella and other reduced-fat cheeses.  Choose meat and other protein foods that are low in fat.  Choose beans or other legumes such as split peas or lentils. Choose fish, skinless poultry (chicken or turkey), or lean cuts of red meat (beef or pork). Before you cook meat or poultry, cut off any visible fat.   Use less fat and oil.  Try baking foods instead of frying them. Add less fat, such as margarine, sour cream, regular salad dressing and mayonnaise to foods. Eat fewer high-fat foods. Some examples of high-fat foods include french fries, doughnuts, ice  cream, and cakes.  Eat fewer sweets.  Limit foods and drinks that are high in sugar. This includes candy, cookies, regular soda, and sweetened drinks.  Exercise:  Exercise at least 30 minutes per day on most days of the week. Some examples of exercise include walking, biking, dancing, and swimming. You can also fit in more physical activity by taking the stairs instead of the elevator or parking farther away from stores. Ask your healthcare provider about the best exercise plan for you.    © Copyright [employer] 2018 Information is for End User's use only and may not be sold, redistributed or otherwise used for commercial purposes. All illustrations and images included in CareNotes® are the copyrighted property of A.[employer].A.M., Inc. or [employer]

## 2025-06-03 NOTE — PROGRESS NOTES
Name: Colin Pérez      : 1950      MRN: [id number]  Encounter Provider: Yomi Hope MD  Encounter Date: 6/3/2025   Encounter department: [facility]  :  Assessment & Plan  Medicare annual wellness visit, subsequent  Regarding his Medicare annual well visit, patient is given age and diagnosis appropriate evaluation and care.  Patient's recent blood work and urine was discussed with patient.  Patient is ordered more blood work and urine to be done before his next visit.  Patient advised take a multivitamin.  Take vitamin D3 2000's daily.  Advise sunscreen and checking his skin himself on a regular basis.  Patient sent the Shingrix vaccine to his pharmacy.  Orders:  •  Comprehensive metabolic panel; Future  •  Lipid panel; Future  •  UA w Reflex to Microscopic w Reflex to Culture; Future    Type 2 diabetes mellitus with both eyes affected by mild nonproliferative retinopathy without macular edema, without long-term current use of insulin ([facility])    Lab Results   Component Value Date    HGBA1C 6.5 (H) 2025   Controlled.  His glucose was 118 and his A1c is 6.5 now but it did go up from 6.3 from November last year.  Of note it was 6.5 in May of last year as well.  Advised patient have less starches sweets and fats.  Exercise as tolerated.  Continue current therapy.  Recheck labs again in 6 months.  Patient is given referral to his ophthalmologist.  Patient also reminded of checking his feet himself daily.    Orders:  •  Comprehensive metabolic panel; Future  •  Hemoglobin A1C; Future  •  Albumin / creatinine urine ratio; Future  •  Ambulatory referral to Ophthalmology; Future    Type 2 diabetes mellitus with hyperglycemia, without long-term current use of insulin ([facility])    Lab Results   Component Value Date    HGBA1C 6.5 (H) 2025   Controlled.  His glucose was 118 and his A1c is 6.5 now but it did go up from 6.3 from November last year.  Of note it was 6.5 in May of  last year as well.  Advised patient have less starches sweets and fats.  Exercise as tolerated.  Continue current therapy.  Recheck labs again in 6 months.  Patient is given referral to his ophthalmologist.  Patient also reminded of checking his feet himself daily.    Orders:  •  Comprehensive metabolic panel; Future  •  Hemoglobin A1C; Future  •  Albumin / creatinine urine ratio; Future  •  losartan (COZAAR) 25 mg tablet; Take 1 tablet (25 mg total) by mouth before bedtime.  •  Ambulatory referral to Ophthalmology; Future    Coronary artery disease involving native coronary artery of native heart without angina pectoris  Patient sees cardiology.  Medical management advised.  With an LDL less than 70.  Continue current therapy as per his cardiologist.  And follow-up with cardiology as scheduled.  Orders:  •  Comprehensive metabolic panel; Future  •  Lipid panel; Future    Mixed hyperlipidemia  Controlled.  LFTs normal.  His total cholesterol is 104 now when it was 124 in November last year, triglycerides down to 66 from 79, HDL down to 42 from 49 and his LDL went down to 49 from 59.  Advised patient to continue cutting down on his fat, starches and sweets.  Exercise time.  Continue current therapy.  Recheck labs in 6 months.  Orders:  •  Comprehensive metabolic panel; Future  •  Lipid panel; Future    Elevated PSA  Per history.  Discussed with patient.  Patient reeducation regarding things to abstain from before he gets his next PSA.  Patient understands and will do.  Orders:  •  PSA, total and free; Future    Hearing deficit, bilateral  Mild.  Not acute or new.  Discussed with patient.  Patient education.  And patient given referral to the audiologist to be seen again in a year as per patient that he was told to come back.  Orders:  •  Ambulatory Referral to Audiology; Future    Need for vaccination  Discussed with patient.  Orders:  •  Zoster Vac Recomb Adjuvanted (Shingrix) 50 MCG/0.5ML SUSR; Inject 0.5 mL into a  muscle once for 1 dose Repeat dose in 2 to 6 months             RTO 6 months to do the blood work and urine before.  Patient can see me prior to that for anything else in between.          Preventive health issues were discussed with patient, and age appropriate screening tests were ordered as noted in patient's After Visit Summary. Personalized health advice and appropriate referrals for health education or preventive services given if needed, as noted in patient's After Visit Summary.    History of Present Illness     75-year-old male here for his Medicare annual well visit.  Patient also like to be evaluated for his diabetes, hypertension and hyperlipidemia.  Patient did blood work and urine recently.  Patient is due for his eye doctor regarding his diabetes.  Patient denies tobacco.  Patient is up-to-date with his vaccines except that he needs a shingles vaccine.  No history of an adverse reaction to vaccines in the past.        Patient Care Team:  Yomi Hope MD as PCP - General (Family Medicine)  Bernie Marcus MD as PCP - PCP-[facility] Attributed-MD Yomi Gabriel MD (Family Medicine)  Raji Bland MD (Urology)    Review of Systems   Constitutional:  Negative for activity change, appetite change, chills, fatigue, fever and unexpected weight change.   HENT:  Positive for hearing loss. Negative for congestion and sore throat.    Eyes:  Negative for visual disturbance.   Respiratory:  Negative for cough and shortness of breath.    Cardiovascular:  Negative for chest pain, palpitations and leg swelling.   Gastrointestinal:  Negative for abdominal pain, blood in stool, constipation, diarrhea, nausea and vomiting.   Genitourinary:  Negative for dysuria and hematuria.   Musculoskeletal:  Negative for arthralgias.   Skin:  Negative for rash.   Neurological:  Negative for dizziness and headaches.   Psychiatric/Behavioral:  Negative for dysphoric  mood and sleep disturbance. The patient is not nervous/anxious.      Medical History Reviewed by provider this encounter:  Tobacco  Allergies  Meds  Problems  Med Hx  Surg Hx  Fam Hx       Annual Wellness Visit Questionnaire   Colin is here for his Subsequent Wellness visit. Last Medicare Wellness visit information reviewed, patient interviewed and updates made to the record today.      Health Risk Assessment:   Patient rates overall health as very good. Patient feels that their physical health rating is same. Patient is satisfied with their life. Eyesight was rated as same. Hearing was rated as same. Patient feels that their emotional and mental health rating is same. Patients states they are sometimes angry. Patient states they are never, rarely unusually tired/fatigued. Pain experienced in the last 7 days has been none. Patient states that he has experienced no weight loss or gain in last 6 months.     Fall Risk Screening:   In the past year, patient has experienced: no history of falling in past year      Home Safety:  Patient does not have trouble with stairs inside or outside of their home. Patient has working smoke alarms and has no working carbon monoxide detector. Home safety hazards include: none.     Nutrition:   Current diet is Diabetic, Low Saturated Fat and Other (please comment). VEGETARIAN DINNERS 3 TIMES/WEEK; 2 OR MORE PIECES OF FRUIT DAILY    Medications:   Patient is not currently taking any over-the-counter supplements. Patient is able to manage medications.     Activities of Daily Living (ADLs)/Instrumental Activities of Daily Living (IADLs):   Walk and transfer into and out of bed and chair?: Yes  Dress and groom yourself?: Yes    Bathe or shower yourself?: Yes    Feed yourself? Yes  Do your laundry/housekeeping?: Yes  Manage your money, pay your bills and track your expenses?: Yes  Make your own meals?: Yes    Do your own shopping?: Yes    Durable Medical Equipment Suppliers  NOT  APPLICABLE    Previous Hospitalizations:   Any hospitalizations or ED visits within the last 12 months?: No      Advance Care Planning:   Living will: Yes    Durable POA for healthcare: Yes    Advanced directive: Yes    Advanced directive counseling given: Yes      Comments: Discussed with patient today.    Cognitive Screening:   Provider or family/friend/caregiver concerned regarding cognition?: No    Preventive Screenings      Cardiovascular Screening:    General: Screening Not Indicated and History Lipid Disorder      Diabetes Screening:     General: Screening Not Indicated and History Diabetes      Colorectal Cancer Screening:     General: Screening Current      Abdominal Aortic Aneurysm (AAA) Screening:    Risk factors include: age between 65-76 yo        Lung Cancer Screening:     General: Screening Not Indicated      Hepatitis C Screening:    General: Screening Current    Immunizations:  - Immunizations due: Zoster (Shingrix)  - Risks/benefits immunizations discussed      Screening, Brief Intervention, and Referral to Treatment (SBIRT)     Screening  Typical number of drinks in a day: 0  Typical number of drinks in a week: 0  Interpretation: Low risk drinking behavior.    AUDIT-C Screenin) How often did you have a drink containing alcohol in the past year? monthly or less  2) How many drinks did you have on a typical day when you were drinking in the past year? 0  3) How often did you have 6 or more drinks on one occasion in the past year? never    AUDIT-C Score: 1  Interpretation: Score 0-3 (male): Negative screen for alcohol misuse    Single Item Drug Screening:  How often have you used an illegal drug (including marijuana) or a prescription medication for non-medical reasons in the past year? never    Single Item Drug Screen Score: 0  Interpretation: Negative screen for possible drug use disorder    Other Counseling Topics:   Car/seat belt/driving safety, skin self-exam, sunscreen and calcium and  "vitamin D intake and regular weightbearing exercise.     Social Drivers of Health     Financial Resource Strain: Low Risk  (1/11/2023)    Overall Financial Resource Strain (CARDIA)    • Difficulty of Paying Living Expenses: Not hard at all   Food Insecurity: No Food Insecurity (5/30/2025)    Nursing - Inadequate Food Risk Classification    • Worried About Running Out of Food in the Last Year: Never true    • Ran Out of Food in the Last Year: Never true   Transportation Needs: No Transportation Needs (5/30/2025)    PRAPARE - Transportation    • Lack of Transportation (Medical): No    • Lack of Transportation (Non-Medical): No   Housing Stability: Low Risk  (5/30/2025)    Housing Stability Vital Sign    • Unable to Pay for Housing in the Last Year: No    • Number of Times Moved in the Last Year: 0    • Homeless in the Last Year: No   Utilities: Not At Risk (6/3/2025)    [facility] Utilities    • Threatened with loss of utilities: No     No results found.    Objective   /61 (BP Location: Left arm, Patient Position: Sitting, Cuff Size: Adult)   Pulse 57   Temp 98.2 °F (36.8 °C) (Temporal)   Resp 16   Ht 5' 11\" (1.803 m)   Wt 84.1 kg (185 lb 6.4 oz)   SpO2 99%   BMI 25.86 kg/m²     Physical Exam  Vitals reviewed.   Constitutional:       General: He is not in acute distress.     Appearance: Normal appearance. He is not ill-appearing.   HENT:      Head: Normocephalic and atraumatic.      Right Ear: Tympanic membrane, ear canal and external ear normal.      Left Ear: Tympanic membrane, ear canal and external ear normal.      Mouth/Throat:      Mouth: Mucous membranes are moist.      Pharynx: Oropharynx is clear.     Eyes:      Extraocular Movements: Extraocular movements intact.      Conjunctiva/sclera: Conjunctivae normal.     Neck:      Vascular: No carotid bruit.     Cardiovascular:      Rate and Rhythm: Normal rate and regular rhythm.   Pulmonary:      Effort: Pulmonary effort is normal.      Breath sounds: Normal " breath sounds.   Abdominal:      Palpations: Abdomen is soft.      Tenderness: There is no abdominal tenderness. There is no right CVA tenderness or left CVA tenderness.     Musculoskeletal:         General: No tenderness.      Right lower leg: No edema.      Left lower leg: No edema.      Comments: No calf tenderness bilateral.   Lymphadenopathy:      Cervical: No cervical adenopathy.     Skin:     General: Skin is warm.      Findings: No rash.     Neurological:      General: No focal deficit present.      Mental Status: He is alert and oriented to person, place, and time.     Psychiatric:         Mood and Affect: Mood normal.         Behavior: Behavior normal.         Thought Content: Thought content normal.

## 2025-06-03 NOTE — ASSESSMENT & PLAN NOTE
Per history.  Discussed with patient.  Patient reeducation regarding things to abstain from before he gets his next PSA.  Patient understands and will do.  Orders:  •  PSA, total and free; Future

## 2025-06-03 NOTE — ASSESSMENT & PLAN NOTE
Patient sees cardiology.  Medical management advised.  With an LDL less than 70.  Continue current therapy as per his cardiologist.  And follow-up with cardiology as scheduled.  Orders:  •  Comprehensive metabolic panel; Future  •  Lipid panel; Future

## 2025-06-03 NOTE — ASSESSMENT & PLAN NOTE
Controlled.  LFTs normal.  His total cholesterol is 104 now when it was 124 in November last year, triglycerides down to 66 from 79, HDL down to 42 from 49 and his LDL went down to 49 from 59.  Advised patient to continue cutting down on his fat, starches and sweets.  Exercise time.  Continue current therapy.  Recheck labs in 6 months.  Orders:  •  Comprehensive metabolic panel; Future  •  Lipid panel; Future

## 2025-06-10 ENCOUNTER — OFFICE VISIT (OUTPATIENT)
Dept: AUDIOLOGY | Age: 75
End: 2025-06-10
Payer: COMMERCIAL

## 2025-06-10 DIAGNOSIS — H90.3 SENSORY HEARING LOSS, BILATERAL: Primary | ICD-10-CM

## 2025-06-10 PROCEDURE — 92567 TYMPANOMETRY: CPT

## 2025-06-10 PROCEDURE — 92552 PURE TONE AUDIOMETRY AIR: CPT

## 2025-06-10 PROCEDURE — 92556 SPEECH AUDIOMETRY COMPLETE: CPT

## 2025-06-10 NOTE — PROGRESS NOTES
Diagnostic Hearing Evaluation    Name:  Colin Pérez  :  1950  Age:  75 y.o.   MRN:  [id number]  Date of Evaluation: 06/10/25     HISTORY:     Reason for visit: Known Hearing Loss binaurally    Colin Pérez is being seen today at the request of Dr. Hope for an annual evaluation of hearing. The patient reports no issues or concerns for change to hearing status. He has a known mild sensorineural hearing loss, bilaterally, that was diagnosed in 2018.    EVALUATION:    Otoscopic Evaluation:   Right Ear: Unremarkable, canal clear   Left Ear: Unremarkable, canal clear    Tympanometry:   Right Ear: Type A; normal middle ear pressure and static compliance    Left Ear: Type A; normal middle ear pressure and static compliance     Speech Audiometry:  Speech Reception (SRT)    Right Ear: 20 dB HL    Left Ear: 25 dB HL    Word Recognition Scores (WRS):  Right Ear: excellent (100 % correct)     Left Ear: excellent (100 % correct)    Stimuli: [id number]    Pure Tone Audiometry:  Conventional pure tone audiometry from 250 - 8000 Hz  was obtained with good reliability and revealed the following:     Right Ear: Mild sensorineural hearing loss (SNHL)    Left Ear: Mild sensorineural hearing loss (SNHL)     *see attached audiogram    RECOMMENDATIONS:  Annual hearing eval, Return to [facility]. for F/U, and Copy to Patient/Caregiver    PATIENT EDUCATION:   The results of today's results and recommendations were reviewed with the patient and his hearing thresholds were explained at length. Treatment options, including amplification and communication strategies, were discussed as appropriate. The patient voiced understanding of his test results. Questions were addressed and the patient was encouraged to contact our department should concerns arise.      Gurmeet Hernandez., CCC-A  Clinical Audiologist  [facility]  [address]  [place] [id number]

## 2025-06-27 DIAGNOSIS — E11.65 TYPE 2 DIABETES MELLITUS WITH HYPERGLYCEMIA, WITHOUT LONG-TERM CURRENT USE OF INSULIN (HCC): ICD-10-CM

## 2025-06-30 RX ORDER — LOSARTAN POTASSIUM 25 MG/1
25 TABLET ORAL DAILY
Qty: 90 TABLET | Refills: 1 | Status: SHIPPED | OUTPATIENT
Start: 2025-06-30

## 2025-07-18 ENCOUNTER — VBI (OUTPATIENT)
Dept: ADMINISTRATIVE | Facility: OTHER | Age: 75
End: 2025-07-18

## 2025-07-18 NOTE — TELEPHONE ENCOUNTER
07/18/25 12:56 PM     Chart reviewed for Hemoglobin A1c was/were submitted to the patient's insurance.     Luis Denise MA   [facility]

## 2025-07-29 DIAGNOSIS — E11.65 TYPE 2 DIABETES MELLITUS WITH HYPERGLYCEMIA, WITHOUT LONG-TERM CURRENT USE OF INSULIN (HCC): ICD-10-CM

## 2025-07-30 RX ORDER — METFORMIN HYDROCHLORIDE 500 MG/1
500 TABLET, EXTENDED RELEASE ORAL
Qty: 90 TABLET | Refills: 1 | Status: SHIPPED | OUTPATIENT
Start: 2025-07-30